# Patient Record
Sex: FEMALE | Race: NATIVE HAWAIIAN OR OTHER PACIFIC ISLANDER | NOT HISPANIC OR LATINO | Employment: UNEMPLOYED | URBAN - METROPOLITAN AREA
[De-identification: names, ages, dates, MRNs, and addresses within clinical notes are randomized per-mention and may not be internally consistent; named-entity substitution may affect disease eponyms.]

---

## 2021-08-13 ENCOUNTER — APPOINTMENT (OUTPATIENT)
Dept: ULTRASOUND IMAGING | Facility: CLINIC | Age: 41
End: 2021-08-13
Attending: EMERGENCY MEDICINE
Payer: MEDICAID

## 2021-08-13 ENCOUNTER — HOSPITAL ENCOUNTER (INPATIENT)
Facility: CLINIC | Age: 41
LOS: 10 days | Discharge: HOME HEALTH | End: 2021-08-23
Attending: EMERGENCY MEDICINE | Admitting: INTERNAL MEDICINE
Payer: MEDICAID

## 2021-08-13 DIAGNOSIS — K92.0 GASTROINTESTINAL HEMORRHAGE WITH HEMATEMESIS: ICD-10-CM

## 2021-08-13 DIAGNOSIS — R60.0 BILATERAL LOWER EXTREMITY EDEMA: ICD-10-CM

## 2021-08-13 DIAGNOSIS — Z20.822 CONTACT WITH AND (SUSPECTED) EXPOSURE TO COVID-19: ICD-10-CM

## 2021-08-13 DIAGNOSIS — K70.31 ALCOHOLIC CIRRHOSIS OF LIVER WITH ASCITES (H): ICD-10-CM

## 2021-08-13 DIAGNOSIS — R10.84 ABDOMINAL PAIN, GENERALIZED: ICD-10-CM

## 2021-08-13 LAB
ALBUMIN FLD-MCNC: 0.4 G/DL
ALBUMIN SERPL-MCNC: 1.7 G/DL (ref 3.4–5)
ALP SERPL-CCNC: 119 U/L (ref 40–150)
ALT SERPL W P-5'-P-CCNC: 18 U/L (ref 0–50)
ANION GAP SERPL CALCULATED.3IONS-SCNC: 8 MMOL/L (ref 3–14)
AST SERPL W P-5'-P-CCNC: 21 U/L (ref 0–45)
BASOPHILS # BLD AUTO: 0.1 10E3/UL (ref 0–0.2)
BASOPHILS NFR BLD AUTO: 1 %
BILIRUB SERPL-MCNC: 1.1 MG/DL (ref 0.2–1.3)
BUN SERPL-MCNC: 8 MG/DL (ref 7–30)
CALCIUM SERPL-MCNC: 7.6 MG/DL (ref 8.5–10.1)
CHLORIDE BLD-SCNC: 106 MMOL/L (ref 94–109)
CO2 SERPL-SCNC: 23 MMOL/L (ref 20–32)
CREAT SERPL-MCNC: 0.69 MG/DL (ref 0.52–1.04)
EOSINOPHIL # BLD AUTO: 0.3 10E3/UL (ref 0–0.7)
EOSINOPHIL NFR BLD AUTO: 2 %
ERYTHROCYTE [DISTWIDTH] IN BLOOD BY AUTOMATED COUNT: 15.6 % (ref 10–15)
GFR SERPL CREATININE-BSD FRML MDRD: >90 ML/MIN/1.73M2
GLUCOSE BLD-MCNC: 136 MG/DL (ref 70–99)
HCT VFR BLD AUTO: 29.8 % (ref 35–47)
HGB BLD-MCNC: 9.3 G/DL (ref 11.7–15.7)
HOLD SPECIMEN: NORMAL
IMM GRANULOCYTES # BLD: 0.1 10E3/UL
IMM GRANULOCYTES NFR BLD: 1 %
INR PPP: 1.72 (ref 0.85–1.15)
LACTATE SERPL-SCNC: 2.7 MMOL/L (ref 0.7–2)
LIPASE SERPL-CCNC: <10 U/L (ref 73–393)
LYMPHOCYTES # BLD AUTO: 2.5 10E3/UL (ref 0.8–5.3)
LYMPHOCYTES NFR BLD AUTO: 14 %
MCH RBC QN AUTO: 31 PG (ref 26.5–33)
MCHC RBC AUTO-ENTMCNC: 31.2 G/DL (ref 31.5–36.5)
MCV RBC AUTO: 99 FL (ref 78–100)
MONOCYTES # BLD AUTO: 1 10E3/UL (ref 0–1.3)
MONOCYTES NFR BLD AUTO: 6 %
NEUTROPHILS # BLD AUTO: 14.2 10E3/UL (ref 1.6–8.3)
NEUTROPHILS NFR BLD AUTO: 76 %
NRBC # BLD AUTO: 0 10E3/UL
NRBC BLD AUTO-RTO: 0 /100
PLATELET # BLD AUTO: 307 10E3/UL (ref 150–450)
POTASSIUM BLD-SCNC: 3.3 MMOL/L (ref 3.4–5.3)
PROT FLD-MCNC: 1.9 G/DL
PROT SERPL-MCNC: 6.7 G/DL (ref 6.8–8.8)
RBC # BLD AUTO: 3 10E6/UL (ref 3.8–5.2)
SARS-COV-2 RNA RESP QL NAA+PROBE: NEGATIVE
SODIUM SERPL-SCNC: 137 MMOL/L (ref 133–144)
WBC # BLD AUTO: 18.1 10E3/UL (ref 4–11)

## 2021-08-13 PROCEDURE — 36415 COLL VENOUS BLD VENIPUNCTURE: CPT | Performed by: INTERNAL MEDICINE

## 2021-08-13 PROCEDURE — 49083 ABD PARACENTESIS W/IMAGING: CPT | Performed by: EMERGENCY MEDICINE

## 2021-08-13 PROCEDURE — 83010 ASSAY OF HAPTOGLOBIN QUANT: CPT | Performed by: INTERNAL MEDICINE

## 2021-08-13 PROCEDURE — 250N000011 HC RX IP 250 OP 636: Performed by: EMERGENCY MEDICINE

## 2021-08-13 PROCEDURE — 89050 BODY FLUID CELL COUNT: CPT | Performed by: EMERGENCY MEDICINE

## 2021-08-13 PROCEDURE — 250N000013 HC RX MED GY IP 250 OP 250 PS 637: Performed by: EMERGENCY MEDICINE

## 2021-08-13 PROCEDURE — 85025 COMPLETE CBC W/AUTO DIFF WBC: CPT | Performed by: EMERGENCY MEDICINE

## 2021-08-13 PROCEDURE — 87205 SMEAR GRAM STAIN: CPT | Performed by: EMERGENCY MEDICINE

## 2021-08-13 PROCEDURE — 76705 ECHO EXAM OF ABDOMEN: CPT | Performed by: EMERGENCY MEDICINE

## 2021-08-13 PROCEDURE — C9803 HOPD COVID-19 SPEC COLLECT: HCPCS | Performed by: EMERGENCY MEDICINE

## 2021-08-13 PROCEDURE — 99221 1ST HOSP IP/OBS SF/LOW 40: CPT | Mod: AI | Performed by: INTERNAL MEDICINE

## 2021-08-13 PROCEDURE — C9113 INJ PANTOPRAZOLE SODIUM, VIA: HCPCS | Performed by: EMERGENCY MEDICINE

## 2021-08-13 PROCEDURE — 84157 ASSAY OF PROTEIN OTHER: CPT | Performed by: EMERGENCY MEDICINE

## 2021-08-13 PROCEDURE — 96361 HYDRATE IV INFUSION ADD-ON: CPT | Mod: 59 | Performed by: EMERGENCY MEDICINE

## 2021-08-13 PROCEDURE — 99285 EMERGENCY DEPT VISIT HI MDM: CPT | Mod: 25 | Performed by: EMERGENCY MEDICINE

## 2021-08-13 PROCEDURE — 96375 TX/PRO/DX INJ NEW DRUG ADDON: CPT | Mod: 59 | Performed by: EMERGENCY MEDICINE

## 2021-08-13 PROCEDURE — 76705 ECHO EXAM OF ABDOMEN: CPT | Mod: 26 | Performed by: EMERGENCY MEDICINE

## 2021-08-13 PROCEDURE — 93970 EXTREMITY STUDY: CPT

## 2021-08-13 PROCEDURE — 86901 BLOOD TYPING SEROLOGIC RH(D): CPT | Performed by: EMERGENCY MEDICINE

## 2021-08-13 PROCEDURE — 36415 COLL VENOUS BLD VENIPUNCTURE: CPT | Performed by: EMERGENCY MEDICINE

## 2021-08-13 PROCEDURE — 99207 PR CDG-HISTORY COMP: MEETS EXP. PROB FOCUSED- DOWN CODED LACK OF PFSH: CPT | Performed by: INTERNAL MEDICINE

## 2021-08-13 PROCEDURE — 85610 PROTHROMBIN TIME: CPT | Performed by: EMERGENCY MEDICINE

## 2021-08-13 PROCEDURE — 80053 COMPREHEN METABOLIC PANEL: CPT | Performed by: EMERGENCY MEDICINE

## 2021-08-13 PROCEDURE — U0005 INFEC AGEN DETEC AMPLI PROBE: HCPCS | Performed by: EMERGENCY MEDICINE

## 2021-08-13 PROCEDURE — 83690 ASSAY OF LIPASE: CPT | Performed by: EMERGENCY MEDICINE

## 2021-08-13 PROCEDURE — 83605 ASSAY OF LACTIC ACID: CPT | Performed by: EMERGENCY MEDICINE

## 2021-08-13 PROCEDURE — 250N000011 HC RX IP 250 OP 636: Performed by: INTERNAL MEDICINE

## 2021-08-13 PROCEDURE — 258N000003 HC RX IP 258 OP 636: Performed by: EMERGENCY MEDICINE

## 2021-08-13 PROCEDURE — 120N000002 HC R&B MED SURG/OB UMMC

## 2021-08-13 PROCEDURE — 89051 BODY FLUID CELL COUNT: CPT | Performed by: EMERGENCY MEDICINE

## 2021-08-13 PROCEDURE — 87040 BLOOD CULTURE FOR BACTERIA: CPT | Performed by: INTERNAL MEDICINE

## 2021-08-13 PROCEDURE — 96365 THER/PROPH/DIAG IV INF INIT: CPT | Mod: 59 | Performed by: EMERGENCY MEDICINE

## 2021-08-13 PROCEDURE — 99291 CRITICAL CARE FIRST HOUR: CPT | Mod: 25 | Performed by: EMERGENCY MEDICINE

## 2021-08-13 PROCEDURE — 99292 CRITICAL CARE ADDL 30 MIN: CPT | Performed by: EMERGENCY MEDICINE

## 2021-08-13 PROCEDURE — 96376 TX/PRO/DX INJ SAME DRUG ADON: CPT | Mod: 59 | Performed by: EMERGENCY MEDICINE

## 2021-08-13 PROCEDURE — 82042 OTHER SOURCE ALBUMIN QUAN EA: CPT | Performed by: EMERGENCY MEDICINE

## 2021-08-13 PROCEDURE — 93970 EXTREMITY STUDY: CPT | Mod: 26 | Performed by: RADIOLOGY

## 2021-08-13 RX ORDER — MORPHINE SULFATE 4 MG/ML
4 INJECTION, SOLUTION INTRAMUSCULAR; INTRAVENOUS ONCE
Status: COMPLETED | OUTPATIENT
Start: 2021-08-13 | End: 2021-08-13

## 2021-08-13 RX ORDER — OCTREOTIDE ACETATE 50 UG/ML
50 INJECTION, SOLUTION INTRAVENOUS; SUBCUTANEOUS ONCE
Status: COMPLETED | OUTPATIENT
Start: 2021-08-13 | End: 2021-08-13

## 2021-08-13 RX ORDER — SODIUM CHLORIDE 9 MG/ML
INJECTION, SOLUTION INTRAVENOUS ONCE
Status: COMPLETED | OUTPATIENT
Start: 2021-08-13 | End: 2021-08-13

## 2021-08-13 RX ORDER — POTASSIUM CHLORIDE 750 MG/1
40 TABLET, EXTENDED RELEASE ORAL ONCE
Status: COMPLETED | OUTPATIENT
Start: 2021-08-13 | End: 2021-08-13

## 2021-08-13 RX ORDER — MORPHINE SULFATE 4 MG/ML
4 INJECTION, SOLUTION INTRAMUSCULAR; INTRAVENOUS
Status: DISCONTINUED | OUTPATIENT
Start: 2021-08-13 | End: 2021-08-14

## 2021-08-13 RX ORDER — ALBUMIN (HUMAN) 12.5 G/50ML
100 SOLUTION INTRAVENOUS ONCE
Status: COMPLETED | OUTPATIENT
Start: 2021-08-13 | End: 2021-08-14

## 2021-08-13 RX ORDER — SPIRONOLACTONE 50 MG/1
50 TABLET, FILM COATED ORAL DAILY
Status: ON HOLD | COMMUNITY
End: 2021-08-23

## 2021-08-13 RX ORDER — LACTULOSE 10 G/15ML
10 SOLUTION ORAL 2 TIMES DAILY
COMMUNITY

## 2021-08-13 RX ORDER — CEFTRIAXONE 2 G/1
2 INJECTION, POWDER, FOR SOLUTION INTRAMUSCULAR; INTRAVENOUS ONCE
Status: COMPLETED | OUTPATIENT
Start: 2021-08-13 | End: 2021-08-14

## 2021-08-13 RX ORDER — FLUCONAZOLE 200 MG/1
200 TABLET ORAL DAILY
Status: ON HOLD | COMMUNITY
Start: 2021-08-04 | End: 2021-08-23

## 2021-08-13 RX ADMIN — MORPHINE SULFATE 4 MG: 4 INJECTION INTRAVENOUS at 20:25

## 2021-08-13 RX ADMIN — SODIUM CHLORIDE: 900 INJECTION, SOLUTION INTRAVENOUS at 21:40

## 2021-08-13 RX ADMIN — POTASSIUM CHLORIDE 40 MEQ: 750 TABLET, EXTENDED RELEASE ORAL at 23:00

## 2021-08-13 RX ADMIN — CEFTRIAXONE SODIUM 2 G: 2 INJECTION, POWDER, FOR SOLUTION INTRAMUSCULAR; INTRAVENOUS at 23:00

## 2021-08-13 RX ADMIN — PANTOPRAZOLE SODIUM 40 MG: 40 INJECTION, POWDER, FOR SOLUTION INTRAVENOUS at 22:37

## 2021-08-13 RX ADMIN — OCTREOTIDE ACETATE 50 MCG: 50 INJECTION, SOLUTION INTRAVENOUS; SUBCUTANEOUS at 22:41

## 2021-08-13 RX ADMIN — MORPHINE SULFATE 4 MG: 4 INJECTION INTRAVENOUS at 22:34

## 2021-08-13 RX ADMIN — SODIUM CHLORIDE 8 MG/HR: 9 INJECTION, SOLUTION INTRAVENOUS at 23:10

## 2021-08-13 ASSESSMENT — ENCOUNTER SYMPTOMS
COUGH: 0
ABDOMINAL PAIN: 1
FEVER: 0
VOMITING: 1

## 2021-08-13 ASSESSMENT — MIFFLIN-ST. JEOR: SCORE: 1682.4

## 2021-08-13 NOTE — ED TRIAGE NOTES
Patient was seen at [facility] and diagnosed with liver disease and daughter wants a second opinion. Patient says shes been having increased swelling in leg. Per family patient was also suppose to have paracentesis done this week, but hasn't yet.

## 2021-08-14 ENCOUNTER — APPOINTMENT (OUTPATIENT)
Dept: CT IMAGING | Facility: CLINIC | Age: 41
End: 2021-08-14
Payer: MEDICAID

## 2021-08-14 ENCOUNTER — APPOINTMENT (OUTPATIENT)
Dept: GENERAL RADIOLOGY | Facility: CLINIC | Age: 41
End: 2021-08-14
Payer: MEDICAID

## 2021-08-14 ENCOUNTER — APPOINTMENT (OUTPATIENT)
Dept: ULTRASOUND IMAGING | Facility: CLINIC | Age: 41
End: 2021-08-14
Payer: MEDICAID

## 2021-08-14 LAB
ABO/RH(D): NORMAL
ALBUMIN SERPL-MCNC: 2.8 G/DL (ref 3.4–5)
ALBUMIN UR-MCNC: NEGATIVE MG/DL
ALP SERPL-CCNC: 89 U/L (ref 40–150)
ALT SERPL W P-5'-P-CCNC: 12 U/L (ref 0–50)
ANION GAP SERPL CALCULATED.3IONS-SCNC: 4 MMOL/L (ref 3–14)
ANTIBODY SCREEN: NEGATIVE
APPEARANCE FLD: CLEAR
APPEARANCE UR: CLEAR
APTT PPP: 41 SECONDS (ref 22–38)
AST SERPL W P-5'-P-CCNC: 22 U/L (ref 0–45)
BASOPHILS # BLD AUTO: 0.1 10E3/UL (ref 0–0.2)
BASOPHILS # BLD AUTO: 0.1 10E3/UL (ref 0–0.2)
BASOPHILS NFR BLD AUTO: 1 %
BASOPHILS NFR BLD AUTO: 1 %
BILIRUB SERPL-MCNC: 1.4 MG/DL (ref 0.2–1.3)
BILIRUB UR QL STRIP: NEGATIVE
BUN SERPL-MCNC: 7 MG/DL (ref 7–30)
CALCIUM SERPL-MCNC: 8.1 MG/DL (ref 8.5–10.1)
CHLORIDE BLD-SCNC: 107 MMOL/L (ref 94–109)
CO2 SERPL-SCNC: 26 MMOL/L (ref 20–32)
COLOR FLD: YELLOW
COLOR UR AUTO: ABNORMAL
CREAT SERPL-MCNC: 0.7 MG/DL (ref 0.52–1.04)
CREAT UR-MCNC: 41 MG/DL
EOSINOPHIL # BLD AUTO: 0.3 10E3/UL (ref 0–0.7)
EOSINOPHIL # BLD AUTO: 0.4 10E3/UL (ref 0–0.7)
EOSINOPHIL NFR BLD AUTO: 3 %
EOSINOPHIL NFR BLD AUTO: 3 %
ERYTHROCYTE [DISTWIDTH] IN BLOOD BY AUTOMATED COUNT: 15.3 % (ref 10–15)
ERYTHROCYTE [DISTWIDTH] IN BLOOD BY AUTOMATED COUNT: 15.5 % (ref 10–15)
ERYTHROCYTE [DISTWIDTH] IN BLOOD BY AUTOMATED COUNT: 15.6 % (ref 10–15)
FOLATE SERPL-MCNC: 4.7 NG/ML
GFR SERPL CREATININE-BSD FRML MDRD: >90 ML/MIN/1.73M2
GLUCOSE BLD-MCNC: 82 MG/DL (ref 70–99)
GLUCOSE UR STRIP-MCNC: NEGATIVE MG/DL
HCT VFR BLD AUTO: 24.5 % (ref 35–47)
HCT VFR BLD AUTO: 25.1 % (ref 35–47)
HCT VFR BLD AUTO: 27.6 % (ref 35–47)
HGB BLD-MCNC: 7.7 G/DL (ref 11.7–15.7)
HGB BLD-MCNC: 7.8 G/DL (ref 11.7–15.7)
HGB BLD-MCNC: 8.4 G/DL (ref 11.7–15.7)
HGB BLD-MCNC: 8.6 G/DL (ref 11.7–15.7)
HGB UR QL STRIP: ABNORMAL
HYALINE CASTS: 15 /LPF
IMM GRANULOCYTES # BLD: 0.1 10E3/UL
IMM GRANULOCYTES # BLD: 0.1 10E3/UL
IMM GRANULOCYTES NFR BLD: 0 %
IMM GRANULOCYTES NFR BLD: 1 %
INR PPP: 1.97 (ref 0.85–1.15)
KETONES UR STRIP-MCNC: NEGATIVE MG/DL
LACTATE SERPL-SCNC: 2.1 MMOL/L (ref 0.7–2)
LACTATE SERPL-SCNC: 2.8 MMOL/L (ref 0.7–2)
LEUKOCYTE ESTERASE UR QL STRIP: NEGATIVE
LYMPHOCYTES # BLD AUTO: 1.8 10E3/UL (ref 0.8–5.3)
LYMPHOCYTES # BLD AUTO: 3.1 10E3/UL (ref 0.8–5.3)
LYMPHOCYTES NFR BLD AUTO: 15 %
LYMPHOCYTES NFR BLD AUTO: 22 %
LYMPHOCYTES NFR FLD MANUAL: 47 %
MCH RBC QN AUTO: 31.2 PG (ref 26.5–33)
MCH RBC QN AUTO: 31.3 PG (ref 26.5–33)
MCH RBC QN AUTO: 31.4 PG (ref 26.5–33)
MCHC RBC AUTO-ENTMCNC: 31.1 G/DL (ref 31.5–36.5)
MCHC RBC AUTO-ENTMCNC: 31.2 G/DL (ref 31.5–36.5)
MCHC RBC AUTO-ENTMCNC: 31.4 G/DL (ref 31.5–36.5)
MCV RBC AUTO: 100 FL (ref 78–100)
MONOCYTES # BLD AUTO: 0.8 10E3/UL (ref 0–1.3)
MONOCYTES # BLD AUTO: 0.9 10E3/UL (ref 0–1.3)
MONOCYTES NFR BLD AUTO: 7 %
MONOCYTES NFR BLD AUTO: 7 %
MONOS+MACROS NFR FLD MANUAL: 45 %
MUCOUS THREADS #/AREA URNS LPF: PRESENT /LPF
NEUTROPHILS # BLD AUTO: 9 10E3/UL (ref 1.6–8.3)
NEUTROPHILS # BLD AUTO: 9.4 10E3/UL (ref 1.6–8.3)
NEUTROPHILS NFR BLD AUTO: 67 %
NEUTROPHILS NFR BLD AUTO: 73 %
NEUTS BAND NFR FLD MANUAL: 8 %
NITRATE UR QL: NEGATIVE
NRBC # BLD AUTO: 0 10E3/UL
NRBC # BLD AUTO: 0 10E3/UL
NRBC BLD AUTO-RTO: 0 /100
NRBC BLD AUTO-RTO: 0 /100
PH UR STRIP: 5.5 [PH] (ref 5–7)
PLATELET # BLD AUTO: 232 10E3/UL (ref 150–450)
PLATELET # BLD AUTO: 248 10E3/UL (ref 150–450)
PLATELET # BLD AUTO: 257 10E3/UL (ref 150–450)
POTASSIUM BLD-SCNC: 3.5 MMOL/L (ref 3.4–5.3)
PROT SERPL-MCNC: 6.8 G/DL (ref 6.8–8.8)
PROT UR-MCNC: 0.12 G/L
PROT/CREAT 24H UR: 0.29 G/G CR (ref 0–0.2)
RBC # BLD AUTO: 2.45 10E6/UL (ref 3.8–5.2)
RBC # BLD AUTO: 2.5 10E6/UL (ref 3.8–5.2)
RBC # BLD AUTO: 2.75 10E6/UL (ref 3.8–5.2)
RBC URINE: <1 /HPF
RETICS # AUTO: 0.06 10E6/UL (ref 0.03–0.1)
RETICS # AUTO: 0.07 10E6/UL (ref 0.03–0.1)
RETICS/RBC NFR AUTO: 2.4 % (ref 0.5–2)
RETICS/RBC NFR AUTO: 2.5 % (ref 0.5–2)
SODIUM SERPL-SCNC: 137 MMOL/L (ref 133–144)
SP GR UR STRIP: 1.02 (ref 1–1.03)
SPECIMEN EXPIRATION DATE: NORMAL
SQUAMOUS EPITHELIAL: 4 /HPF
TSH SERPL DL<=0.005 MIU/L-ACNC: 0.63 MU/L (ref 0.4–4)
UROBILINOGEN UR STRIP-MCNC: NORMAL MG/DL
VIT B12 SERPL-MCNC: 776 PG/ML (ref 193–986)
WBC # BLD AUTO: 12 10E3/UL (ref 4–11)
WBC # BLD AUTO: 12.1 10E3/UL (ref 4–11)
WBC # BLD AUTO: 13.9 10E3/UL (ref 4–11)
WBC # FLD AUTO: 118 /UL
WBC URINE: 1 /HPF

## 2021-08-14 PROCEDURE — 80321 ALCOHOLS BIOMARKERS 1OR 2: CPT | Performed by: STUDENT IN AN ORGANIZED HEALTH CARE EDUCATION/TRAINING PROGRAM

## 2021-08-14 PROCEDURE — 85025 COMPLETE CBC W/AUTO DIFF WBC: CPT

## 2021-08-14 PROCEDURE — 258N000003 HC RX IP 258 OP 636

## 2021-08-14 PROCEDURE — 82607 VITAMIN B-12: CPT | Performed by: STUDENT IN AN ORGANIZED HEALTH CARE EDUCATION/TRAINING PROGRAM

## 2021-08-14 PROCEDURE — 80321 ALCOHOLS BIOMARKERS 1OR 2: CPT | Performed by: EMERGENCY MEDICINE

## 2021-08-14 PROCEDURE — 85045 AUTOMATED RETICULOCYTE COUNT: CPT | Performed by: STUDENT IN AN ORGANIZED HEALTH CARE EDUCATION/TRAINING PROGRAM

## 2021-08-14 PROCEDURE — 85027 COMPLETE CBC AUTOMATED: CPT | Performed by: STUDENT IN AN ORGANIZED HEALTH CARE EDUCATION/TRAINING PROGRAM

## 2021-08-14 PROCEDURE — 250N000011 HC RX IP 250 OP 636

## 2021-08-14 PROCEDURE — 83605 ASSAY OF LACTIC ACID: CPT

## 2021-08-14 PROCEDURE — 96366 THER/PROPH/DIAG IV INF ADDON: CPT | Performed by: EMERGENCY MEDICINE

## 2021-08-14 PROCEDURE — 80307 DRUG TEST PRSMV CHEM ANLYZR: CPT | Performed by: STUDENT IN AN ORGANIZED HEALTH CARE EDUCATION/TRAINING PROGRAM

## 2021-08-14 PROCEDURE — 83605 ASSAY OF LACTIC ACID: CPT | Performed by: STUDENT IN AN ORGANIZED HEALTH CARE EDUCATION/TRAINING PROGRAM

## 2021-08-14 PROCEDURE — 250N000013 HC RX MED GY IP 250 OP 250 PS 637: Performed by: INTERNAL MEDICINE

## 2021-08-14 PROCEDURE — 36415 COLL VENOUS BLD VENIPUNCTURE: CPT | Performed by: STUDENT IN AN ORGANIZED HEALTH CARE EDUCATION/TRAINING PROGRAM

## 2021-08-14 PROCEDURE — 99222 1ST HOSP IP/OBS MODERATE 55: CPT | Mod: GC | Performed by: INTERNAL MEDICINE

## 2021-08-14 PROCEDURE — 84443 ASSAY THYROID STIM HORMONE: CPT | Performed by: STUDENT IN AN ORGANIZED HEALTH CARE EDUCATION/TRAINING PROGRAM

## 2021-08-14 PROCEDURE — 71045 X-RAY EXAM CHEST 1 VIEW: CPT

## 2021-08-14 PROCEDURE — 120N000002 HC R&B MED SURG/OB UMMC

## 2021-08-14 PROCEDURE — 999N000127 HC STATISTIC PERIPHERAL IV START W US GUIDANCE

## 2021-08-14 PROCEDURE — 85018 HEMOGLOBIN: CPT | Performed by: STUDENT IN AN ORGANIZED HEALTH CARE EDUCATION/TRAINING PROGRAM

## 2021-08-14 PROCEDURE — 250N000013 HC RX MED GY IP 250 OP 250 PS 637: Performed by: STUDENT IN AN ORGANIZED HEALTH CARE EDUCATION/TRAINING PROGRAM

## 2021-08-14 PROCEDURE — 74177 CT ABD & PELVIS W/CONTRAST: CPT | Mod: 26 | Performed by: STUDENT IN AN ORGANIZED HEALTH CARE EDUCATION/TRAINING PROGRAM

## 2021-08-14 PROCEDURE — 250N000011 HC RX IP 250 OP 636: Performed by: INTERNAL MEDICINE

## 2021-08-14 PROCEDURE — C9113 INJ PANTOPRAZOLE SODIUM, VIA: HCPCS

## 2021-08-14 PROCEDURE — 99233 SBSQ HOSP IP/OBS HIGH 50: CPT | Mod: GC | Performed by: INTERNAL MEDICINE

## 2021-08-14 PROCEDURE — 85025 COMPLETE CBC W/AUTO DIFF WBC: CPT | Performed by: STUDENT IN AN ORGANIZED HEALTH CARE EDUCATION/TRAINING PROGRAM

## 2021-08-14 PROCEDURE — 250N000011 HC RX IP 250 OP 636: Performed by: STUDENT IN AN ORGANIZED HEALTH CARE EDUCATION/TRAINING PROGRAM

## 2021-08-14 PROCEDURE — 82746 ASSAY OF FOLIC ACID SERUM: CPT | Performed by: STUDENT IN AN ORGANIZED HEALTH CARE EDUCATION/TRAINING PROGRAM

## 2021-08-14 PROCEDURE — 85610 PROTHROMBIN TIME: CPT

## 2021-08-14 PROCEDURE — 74177 CT ABD & PELVIS W/CONTRAST: CPT

## 2021-08-14 PROCEDURE — 80053 COMPREHEN METABOLIC PANEL: CPT

## 2021-08-14 PROCEDURE — P9047 ALBUMIN (HUMAN), 25%, 50ML: HCPCS

## 2021-08-14 PROCEDURE — 81001 URINALYSIS AUTO W/SCOPE: CPT | Performed by: EMERGENCY MEDICINE

## 2021-08-14 PROCEDURE — 84156 ASSAY OF PROTEIN URINE: CPT | Performed by: STUDENT IN AN ORGANIZED HEALTH CARE EDUCATION/TRAINING PROGRAM

## 2021-08-14 PROCEDURE — 250N000013 HC RX MED GY IP 250 OP 250 PS 637

## 2021-08-14 PROCEDURE — 71045 X-RAY EXAM CHEST 1 VIEW: CPT | Mod: 26 | Performed by: RADIOLOGY

## 2021-08-14 PROCEDURE — 93975 VASCULAR STUDY: CPT

## 2021-08-14 PROCEDURE — 36415 COLL VENOUS BLD VENIPUNCTURE: CPT

## 2021-08-14 PROCEDURE — 93975 VASCULAR STUDY: CPT | Mod: 26 | Performed by: RADIOLOGY

## 2021-08-14 PROCEDURE — 96368 THER/DIAG CONCURRENT INF: CPT | Performed by: EMERGENCY MEDICINE

## 2021-08-14 PROCEDURE — 85730 THROMBOPLASTIN TIME PARTIAL: CPT

## 2021-08-14 PROCEDURE — 250N000011 HC RX IP 250 OP 636: Performed by: EMERGENCY MEDICINE

## 2021-08-14 RX ORDER — NALOXONE HYDROCHLORIDE 0.4 MG/ML
0.2 INJECTION, SOLUTION INTRAMUSCULAR; INTRAVENOUS; SUBCUTANEOUS
Status: DISCONTINUED | OUTPATIENT
Start: 2021-08-14 | End: 2021-08-23 | Stop reason: HOSPADM

## 2021-08-14 RX ORDER — FUROSEMIDE 10 MG/ML
20 INJECTION INTRAMUSCULAR; INTRAVENOUS ONCE
Status: COMPLETED | OUTPATIENT
Start: 2021-08-14 | End: 2021-08-14

## 2021-08-14 RX ORDER — FOLIC ACID 1 MG/1
1 TABLET ORAL DAILY
Status: DISCONTINUED | OUTPATIENT
Start: 2021-08-14 | End: 2021-08-23 | Stop reason: HOSPADM

## 2021-08-14 RX ORDER — FLUCONAZOLE 100 MG/1
200 TABLET ORAL DAILY
Status: DISCONTINUED | OUTPATIENT
Start: 2021-08-14 | End: 2021-08-15

## 2021-08-14 RX ORDER — HYDROMORPHONE HYDROCHLORIDE 1 MG/ML
0.5 INJECTION, SOLUTION INTRAMUSCULAR; INTRAVENOUS; SUBCUTANEOUS
Status: DISCONTINUED | OUTPATIENT
Start: 2021-08-14 | End: 2021-08-17

## 2021-08-14 RX ORDER — LACTULOSE 10 G/15ML
10 SOLUTION ORAL 2 TIMES DAILY
Status: DISCONTINUED | OUTPATIENT
Start: 2021-08-14 | End: 2021-08-14

## 2021-08-14 RX ORDER — ACETAMINOPHEN 500 MG
500 TABLET ORAL EVERY 6 HOURS PRN
Status: DISCONTINUED | OUTPATIENT
Start: 2021-08-14 | End: 2021-08-14

## 2021-08-14 RX ORDER — NALOXONE HYDROCHLORIDE 0.4 MG/ML
0.4 INJECTION, SOLUTION INTRAMUSCULAR; INTRAVENOUS; SUBCUTANEOUS
Status: DISCONTINUED | OUTPATIENT
Start: 2021-08-14 | End: 2021-08-23 | Stop reason: HOSPADM

## 2021-08-14 RX ORDER — HYDROMORPHONE HYDROCHLORIDE 2 MG/1
2 TABLET ORAL EVERY 4 HOURS PRN
Status: DISCONTINUED | OUTPATIENT
Start: 2021-08-14 | End: 2021-08-14

## 2021-08-14 RX ORDER — CEFTRIAXONE 2 G/1
2 INJECTION, POWDER, FOR SOLUTION INTRAMUSCULAR; INTRAVENOUS EVERY 24 HOURS
Status: DISCONTINUED | OUTPATIENT
Start: 2021-08-15 | End: 2021-08-14

## 2021-08-14 RX ORDER — SENNOSIDES 8.6 MG
8.6 TABLET ORAL 2 TIMES DAILY PRN
Status: DISCONTINUED | OUTPATIENT
Start: 2021-08-14 | End: 2021-08-23 | Stop reason: HOSPADM

## 2021-08-14 RX ORDER — IOPAMIDOL 755 MG/ML
135 INJECTION, SOLUTION INTRAVASCULAR ONCE
Status: COMPLETED | OUTPATIENT
Start: 2021-08-14 | End: 2021-08-14

## 2021-08-14 RX ORDER — SPIRONOLACTONE 25 MG/1
50 TABLET ORAL DAILY
Status: DISCONTINUED | OUTPATIENT
Start: 2021-08-14 | End: 2021-08-16

## 2021-08-14 RX ORDER — CEFTRIAXONE 2 G/1
2 INJECTION, POWDER, FOR SOLUTION INTRAMUSCULAR; INTRAVENOUS AT BEDTIME
Status: DISCONTINUED | OUTPATIENT
Start: 2021-08-14 | End: 2021-08-15

## 2021-08-14 RX ORDER — ACETAMINOPHEN 325 MG/1
650 TABLET ORAL EVERY 8 HOURS
Status: DISCONTINUED | OUTPATIENT
Start: 2021-08-14 | End: 2021-08-23 | Stop reason: HOSPADM

## 2021-08-14 RX ORDER — POLYETHYLENE GLYCOL 3350 17 G/17G
17 POWDER, FOR SOLUTION ORAL DAILY PRN
Status: DISCONTINUED | OUTPATIENT
Start: 2021-08-14 | End: 2021-08-19

## 2021-08-14 RX ORDER — PANTOPRAZOLE SODIUM 40 MG/1
40 TABLET, DELAYED RELEASE ORAL
Status: DISCONTINUED | OUTPATIENT
Start: 2021-08-14 | End: 2021-08-23 | Stop reason: HOSPADM

## 2021-08-14 RX ORDER — LIDOCAINE 40 MG/G
CREAM TOPICAL
Status: DISCONTINUED | OUTPATIENT
Start: 2021-08-14 | End: 2021-08-23 | Stop reason: HOSPADM

## 2021-08-14 RX ORDER — OXYCODONE HYDROCHLORIDE 5 MG/1
5 TABLET ORAL EVERY 6 HOURS PRN
Status: DISCONTINUED | OUTPATIENT
Start: 2021-08-14 | End: 2021-08-21

## 2021-08-14 RX ORDER — LACTULOSE 10 G/15ML
20 SOLUTION ORAL 2 TIMES DAILY
Status: DISCONTINUED | OUTPATIENT
Start: 2021-08-14 | End: 2021-08-16

## 2021-08-14 RX ORDER — LANOLIN ALCOHOL/MO/W.PET/CERES
100 CREAM (GRAM) TOPICAL DAILY
Status: DISCONTINUED | OUTPATIENT
Start: 2021-08-14 | End: 2021-08-23 | Stop reason: HOSPADM

## 2021-08-14 RX ADMIN — IOPAMIDOL 135 ML: 755 INJECTION, SOLUTION INTRAVENOUS at 14:41

## 2021-08-14 RX ADMIN — OXYCODONE 5 MG: 5 TABLET ORAL at 15:01

## 2021-08-14 RX ADMIN — CEFTRIAXONE SODIUM 2 G: 2 INJECTION, POWDER, FOR SOLUTION INTRAMUSCULAR; INTRAVENOUS at 21:20

## 2021-08-14 RX ADMIN — FUROSEMIDE 20 MG: 10 INJECTION, SOLUTION INTRAVENOUS at 15:00

## 2021-08-14 RX ADMIN — ACETAMINOPHEN 650 MG: 325 TABLET, FILM COATED ORAL at 10:49

## 2021-08-14 RX ADMIN — SODIUM CHLORIDE 8 MG/HR: 9 INJECTION, SOLUTION INTRAVENOUS at 10:54

## 2021-08-14 RX ADMIN — ACETAMINOPHEN 650 MG: 325 TABLET, FILM COATED ORAL at 18:40

## 2021-08-14 RX ADMIN — HYDROMORPHONE HYDROCHLORIDE 0.5 MG: 1 INJECTION, SOLUTION INTRAMUSCULAR; INTRAVENOUS; SUBCUTANEOUS at 18:40

## 2021-08-14 RX ADMIN — POLYETHYLENE GLYCOL 3350 17 G: 17 POWDER, FOR SOLUTION ORAL at 18:51

## 2021-08-14 RX ADMIN — SENNOSIDES 8.6 MG: 8.6 TABLET, FILM COATED ORAL at 18:50

## 2021-08-14 RX ADMIN — FOLIC ACID 1 MG: 1 TABLET ORAL at 15:01

## 2021-08-14 RX ADMIN — OCTREOTIDE ACETATE 50 MCG/HR: 500 INJECTION, SOLUTION INTRAVENOUS; SUBCUTANEOUS at 00:16

## 2021-08-14 RX ADMIN — HYDROMORPHONE HYDROCHLORIDE 0.5 MG: 1 INJECTION, SOLUTION INTRAMUSCULAR; INTRAVENOUS; SUBCUTANEOUS at 15:01

## 2021-08-14 RX ADMIN — LACTULOSE 20 G: 10 SOLUTION ORAL at 20:41

## 2021-08-14 RX ADMIN — MORPHINE SULFATE 4 MG: 4 INJECTION INTRAVENOUS at 05:10

## 2021-08-14 RX ADMIN — MORPHINE SULFATE 4 MG: 4 INJECTION INTRAVENOUS at 08:37

## 2021-08-14 RX ADMIN — OXYCODONE 5 MG: 5 TABLET ORAL at 21:19

## 2021-08-14 RX ADMIN — LACTULOSE 10 G: 20 SOLUTION ORAL at 08:35

## 2021-08-14 RX ADMIN — SPIRONOLACTONE 50 MG: 25 TABLET, FILM COATED ORAL at 08:35

## 2021-08-14 RX ADMIN — FLUCONAZOLE 200 MG: 100 TABLET ORAL at 08:35

## 2021-08-14 RX ADMIN — ALBUMIN HUMAN 100 G: 0.25 SOLUTION INTRAVENOUS at 02:37

## 2021-08-14 RX ADMIN — HYDROMORPHONE HYDROCHLORIDE 2 MG: 2 TABLET ORAL at 13:46

## 2021-08-14 RX ADMIN — MORPHINE SULFATE 4 MG: 4 INJECTION INTRAVENOUS at 02:18

## 2021-08-14 RX ADMIN — THIAMINE HCL TAB 100 MG 100 MG: 100 TAB at 15:01

## 2021-08-14 RX ADMIN — PANTOPRAZOLE SODIUM 40 MG: 40 TABLET, DELAYED RELEASE ORAL at 18:40

## 2021-08-14 ASSESSMENT — ACTIVITIES OF DAILY LIVING (ADL)
WALKING_OR_CLIMBING_STAIRS_DIFFICULTY: YES
FALL_HISTORY_WITHIN_LAST_SIX_MONTHS: YES
CONCENTRATING,_REMEMBERING_OR_MAKING_DECISIONS_DIFFICULTY: NO
DIFFICULTY_EATING/SWALLOWING: NO
NUMBER_OF_TIMES_PATIENT_HAS_FALLEN_WITHIN_LAST_SIX_MONTHS: 1
ADLS_ACUITY_SCORE: 18
WEAR_GLASSES_OR_BLIND: NO
DIFFICULTY_COMMUNICATING: NO
ADLS_ACUITY_SCORE: 18
DRESSING/BATHING: BATHING DIFFICULTY, REQUIRES EQUIPMENT
ADLS_ACUITY_SCORE: 18
WHICH_OF_THE_ABOVE_FUNCTIONAL_RISKS_HAD_A_RECENT_ONSET_OR_CHANGE?: FALL HISTORY
DRESSING/BATHING_DIFFICULTY: YES
TOILETING_ISSUES: YES
HEARING_DIFFICULTY_OR_DEAF: NO
EQUIPMENT_CURRENTLY_USED_AT_HOME: WALKER, STANDARD
ADLS_ACUITY_SCORE: 18
DOING_ERRANDS_INDEPENDENTLY_DIFFICULTY: NO
TOILETING_ASSISTANCE: TOILETING DIFFICULTY, ASSISTANCE 1 PERSON
WALKING_OR_CLIMBING_STAIRS: AMBULATION DIFFICULTY, ASSISTANCE 1 PERSON;STAIR CLIMBING DIFFICULTY, ASSISTANCE 1 PERSON
ADLS_ACUITY_SCORE: 18

## 2021-08-14 ASSESSMENT — MIFFLIN-ST. JEOR: SCORE: 1836.13

## 2021-08-14 NOTE — PROGRESS NOTES
Reason for transfer: Medical admission  Transferred from: [facility] ED  Report received from: Essie RN   2 RN skin assessment completed by: Jonathan Hill RN Rosa Gastelum RN: scabs on RLE, scar on LLE, skin tear L pannus.   Bed algorithm reevaluated: Yes  Was Pulsate ordered?: No   Detailed Belongings: one purse, one pajama bottom, one wallet, one cell phone, one lighter.

## 2021-08-14 NOTE — H&P
[facility]    History and Physical - [facility] [facility]        Date of Admission:  8/13/2021    Assessment & Plan      Maricruz Lechuga is a 41 year old female admitted on 8/13/2021. She has a history of alcoholic cirrhosis with ascites, morbid obesity, tobacco dependence, and prior alcohol abuse and is admitted for reported hematemesis in the setting of decompensated alcoholic liver cirrhosis, with concern for possible variceal bleed, in addition to chronic ongoing abdominal pain and BLE swelling.    # Reported hematemesis, possible variceal bleed  # Hx recent melena w/ clots  Pt reported one episode of small-volume hematemesis at 0600 8/13 with bright red blood visualized. She reports one additional episode of emesis at 1400 with some red streaks visualized, but less blood than prior. Prior EGD reported at outside hospital w/ results copied into ED, but unable to locate those in the chart to confirm. Hb 9.3 (prior: 8 on 8/14).  - IV Protonix gtt  - IV Octreotide gtt  - IV ceftriaxone  - Discontinued mIVF started in ED; gave 100 g albumin  - GI consulted in the AM; appreciate recs    # Possible SIRS  # Lactic acidosis  # Acute on chronic leukocytosis  Lactate 2.7. Pt mildly tachycardic, but patient is otherwise stable. No SBP on paracentesis. WBC 18.1 compared to 14 prior on 8/4, but pt has had similar leukocytosis in the past. Was previously on steroids, but no longer taking per pt. Patient may be intravascularly depleted (albumin 1.3).  - 100 g albumin orderd; repeat lactate after administration  - blood cultures x2 obtained  - UA ordered  - continue IV ceftriaxone as noted above; broaden if pt becomes febrile  - monitor CBC    # Decompensated alcoholic liver cirrhosis with ascites  Pt w/ prior Hx alcohol abuse (reported last drink 1 year ago). Appears to be taking lactulose and spironolactone as outpatient.  MELD-Na score: 13 at 8/13/2021  6:07 PM  MELD score: 13 at  8/13/2021  6:07 PM  Calculated from:  Serum Creatinine: 0.69 mg/dL (Using min of 1 mg/dL) at 8/13/2021  6:07 PM  Serum Sodium: 137 mmol/L at 8/13/2021  6:07 PM  Total Bilirubin: 1.1 mg/dL at 8/13/2021  6:07 PM  INR(ratio): 1.72 at 8/13/2021  6:07 PM  Age: 41 years    - lactulose 10 g BID  - Held PTA protonix 40 mg BID (on PPI gtt)  - spironolactone 50 mg qAM  - serum alcohol level ordered  - GI consulted; appreciate recs    # BLE swelling and pain  # Volume Status  Pt is on spironolactone, but denies other diuretic use as outpatient. Has used compression stockings as an outpatient, but does not feel that they are effective.  - LE Doppler ultrasound negative for DVT  - no evidence of cellulitis at this time  - continue to monitor    # Hypokalemia  S/p 40 mEq given in the ED  - repeat BMP in the AM    # Acute on chronic normocytic anemia  DDx including normocytic anemia in the setting of acute blood loss, versus possible iron-deficiency anemia w/ element of B12 or folate deficiency.  - consider iron studies, B12, folate studies in the AM    # Generalized abdominal pain  Pt reports chronic ongoing abdominal pain for 5-6 months, it is currently at baseline for her. Diffuse, but worse in the RUQ. Pt has had numerous paracenteses in the past w/ no SBP. Repeat para in the ED here w/ no evidence of SBP. Lipase was WNL. Suspect chronic abdominal pain in the setting of chronic ascites.  - follow-up paracentesis cultures  - therapeutic paracentesis PRN for comfort  - Tylenol 500 mg q6h PRN for pain (max 2 g per 24 hrs)    # Recent yeast cystitis  - continue fluconazole 200 mg daily (8/4 - 8/17)  - repeat UA ordered    # Tobacco dependence - pt reports use of 1-2 cigarettes per day       Diet: NPO for Medical/Clinical Reasons Except for: Meds  DVT Prophylaxis: Held currently  Anderson Catheter: Not present  Fluids: mIVF at 125 ml/hr   Central Lines: None  Code Status: Full Code    Clinically Significant Risk Factors Present on  Admission             # Coagulation Defect: INR = 1.72 (Ref range: 0.85 - 1.15) and/or PTT = N/A on admission, will monitor for bleeding       Disposition Plan   Expected discharge: 3-5 days recommended to prior living arrangement once adequate pain management/ tolerating PO medications, antibiotic plan established, hemoglobin stable and SIRS/Sepsis treated.     The patient's care was discussed with the Attending Physician, Dr. Moustapha Silva.    Laura Rowley MD  [facility]  Securely message with the Vocera Web Console (learn more here)  Text page via [facility] Paging/Directory  Please see sign in/sign out for up to date coverage information  ______________________________________________________________________    Chief Complaint : Leg swelling    History is obtained from the patient, her daughter, and chart review.    History of Present Illness   Maricruz Lechuga is a 41 year old female who has a history of alcoholic cirrhosis with ascites, morbid obesity, tobacco dependence, prior alcohol abuse and is presenting d/t complaints of BLE swelling, diffuse chronic abdominal pain, and an episode of hematemesis.    Per chart review, the patient has had multiple admissions over the past several months for abdominal pain. She was most recently admitted at [facility] in [facility] on 7/31 with abdominal pain and BRBPR, placed empirically on IV Rocephin. The BRBPR was determined to be d/t hemorrhoid. The patient has Hx of numerous paracenteses in the past with no Hx SBP. She underwent paracentesis last on 8/2 with 4.4L fluid removed, no SBP. Rocephin for SBP empiric tx was discontinued on 8/2. Per ED note (although unable to find in chart), the patient had an EGD showing gastritis and a PPI was started. During a previous admission at [facility], the patient was prescribed a prednisolone taper for acute alcoholic hepatitis in June, but reported during  her admission 7/31 that she did not take it as prescribed. She was treated during admission 7/31 for candida albicans UTI, on diflucan x14 days.    The patient reports an episode of hematemesis at 0600 - she states that she tasted blood in the back of her throat and saw blood when she spit it out. The patient reports that her children were concerned and drove her to [facility] for further evaluation (they were unhappy with prior care at [place]). The patient states that on the way to the hospital she had one further episode of emesis where she visualized streaks of blood in the vomit. The patient also notes that 2-3 nights ago she had a BM and noticed dark blood clots present when wiping. She has not had a BM since then. The patient reports chronic abdominal pain x1 year. She states that her current pain is diffuse, worse over her liver, and unchanged from baseline. She reports feeling weaker than usual and fatigued - states that her legs nearly gave out on her when she went to use the commode earlier today. The patient denies recent alcohol use; she states that her last drink was ~1 year ago (she quit for her grandson who is now 1 year old), although per care everywhere the patient's daughter had previously reported that her last drink was 2 months ago. The patient current smokes 1-2 cigarettes per day.     In the ED, pt was afebrile, , /93, 100% SpO2 on room air. Labs were notable for leukocytosis to 18.1, although prior was 14 on 8/4 per care everywhere. Hb 9.3 from 8 prior on 8/4. INR 1.72, albumin 1.7. LFT's WNL. Lactate 2.7. The patient received 2 Gm IV ceftriaxone, 40 mEq potassium chloride, and was started on mIVF at 125 ml/hr. She received 4 mg morphine for pain. Diagnostic paracentesis was performed with .    Last CT A/P 7/22/21:  IMPRESSION:   1. Hepatocellular disease/hepatic steatosis with sequela of portal venous hypertension including splenomegaly, recanalization of the umbilical vein, and  mild-to-moderate ascites.  Increasing moderate anasarca.   2. Otherwise, no other acute process within the abdomen or pelvis.  3. Stable ill-defined hypoattenuating areas within the anterior left hepatic lobe. This could represent more focal fatty deposition although nonemergent MRI of the liver is recommended for further evaluation.    Review of Systems    The 10 point Review of Systems is negative other than noted in the HPI or here.    Past Medical History    Alcoholic cirrhosis with ascites  Morbid obesity  Tobacco dependence  Prior alcohol abuse    Past Surgical History   Hysterectomy    Social History   Tobacco: 1-2 cigarettes daily  Alcohol: denies recent alcohol use; she states that her last drink was ~1 year ago (she quit for her grandson who is now 1 year old), although per care everywhere the patient's daughter had previously reported that her last drink was 2 months ago.    Family History         Prior to Admission Medications   Prior to Admission Medications   Prescriptions Last Dose Informant Patient Reported? Taking?   fluconazole (DIFLUCAN) 200 MG tablet   Yes Yes   Sig: Take 200 mg by mouth daily   lactulose (CHRONULAC) 10 GM/15ML solution   Yes Yes   Sig: Take 10 g by mouth 2 times daily   spironolactone (ALDACTONE) 50 MG tablet   Yes Yes   Sig: Take 50 mg by mouth daily      Facility-Administered Medications: None     Allergies   No Known Allergies    Physical Exam   Vital Signs: Temp: 98.2  F (36.8  C) Temp src: Oral BP: 119/67 Pulse: 113   Resp: 16 SpO2: 100 % O2 Device: None (Room air)    Weight: 230 lbs 0 oz   General Appearance: alert, appears comfortable   HEENT: EOMI, NC/AT  Respiratory: CTA bilaterally, mildly diminished at the bases, no tachypnea  Cardiovascular: mild tachycardia, regular rhythm, no murmur  GI: diffusely tender to palpation throughout, soft, distended  Skin: no rash  Musculoskeletal: pitting edema of the LE's w/ associated tenderness to palpation, no rash or  erythema  Neurologic: no asterixis, alert and oriented, no focal deficits noted    Data   Data reviewed today: I reviewed all medications, new labs and imaging results over the last 24 hours.    Recent Labs   Lab 08/13/21  1807   WBC 18.1*   HGB 9.3*   MCV 99      INR 1.72*      POTASSIUM 3.3*   CHLORIDE 106   CO2 23   BUN 8   CR 0.69   ANIONGAP 8   MELODY 7.6*   *   ALBUMIN 1.7*   PROTTOTAL 6.7*   BILITOTAL 1.1   ALKPHOS 119   ALT 18   AST 21   LIPASE <10*     Recent Results (from the past 24 hour(s))   POC US ABDOMEN LIMITED    Impression    Limited Bedside Abdominal Ultrasound, performed and interpreted by me.     Indication: Abdominal swelling and suspected SBP. Evaluate for Free fluid.     With the patient in Trendelenburg, the RUQ, LUQ, (including the paracolic gutters) views were examined for free fluid. With the patient in reverse Trendelenburg, the suprapubic view was examined for free fluid.     Findings: Free fluid present in both upper quadrants and in the pelvis    IMPRESSION: Free fluid present as noted above.     US Lower Extremity Venous Duplex Bilateral    Impression    RESIDENT PRELIMINARY INTERPRETATION  IMPRESSION:.  1. No deep vein thrombosis in the right or left lower extremity.  2. Bilateral lower extremity subcutaneous edema

## 2021-08-14 NOTE — ED NOTES
[facility]   ED Nurse to Floor Handoff     Maricruz Lechuga is a 41 year old female who speaks English and lives with family members,  in a home  They arrived in the ED by car from home    ED Chief Complaint: Leg Swelling    ED Dx;   Final diagnoses:   Alcoholic cirrhosis of liver with ascites (H)   Abdominal pain, generalized   Gastrointestinal hemorrhage with hematemesis   Bilateral lower extremity edema         Needed?: No    Allergies: No Known Allergies.  Past Medical Hx: History reviewed. No pertinent past medical history.   Baseline Mental status: WDL  Current Mental Status changes: at basesline    Infection present or suspected this encounter: yes other abdominal?  Sepsis suspected: Yes  Isolation type: No active isolations  Patient tested for COVID 19 prior to admission: YES     Activity level - Baseline/Home:  Independent  Activity Level - Current:   Stand with Assist    Bariatric equipment needed?: No    In the ED these meds were given:   Medications   pantoprazole (PROTONIX) 80 mg in sodium chloride 0.9 % 100 mL infusion (8 mg/hr Intravenous New Bag 8/13/21 2310)   octreotide (sandoSTATIN) 1,250 mcg in sodium chloride 0.9 % 250 mL (50 mcg/hr Intravenous New Bag 8/14/21 0016)   morphine (PF) injection 4 mg (4 mg Intravenous Given 8/13/21 2234)   lidocaine 1 % 0.1-1 mL (has no administration in time range)   lidocaine (LMX4) cream (has no administration in time range)   sodium chloride (PF) 0.9% PF flush 3 mL (3 mLs Intracatheter Not Given 8/14/21 0015)   sodium chloride (PF) 0.9% PF flush 3 mL (has no administration in time range)   melatonin tablet 1 mg (has no administration in time range)   fluconazole (DIFLUCAN) tablet 200 mg (has no administration in time range)   lactulose (CHRONULAC) solution 10 g (has no administration in time range)   spironolactone (ALDACTONE) tablet 50 mg (has no administration in time range)   albumin human 25 % injection 100  g (has no administration in time range)   acetaminophen (TYLENOL) tablet 500 mg (has no administration in time range)   cefTRIAXone (ROCEPHIN) 2 g vial to attach to  ml bag for ADULTS or NS 50 ml bag for PEDS (has no administration in time range)   morphine (PF) injection 4 mg (4 mg Intravenous Given 8/13/21 2025)   sodium chloride 0.9% infusion ( Intravenous New Bag 8/13/21 2140)   pantoprazole (PROTONIX) IV push injection 40 mg (40 mg Intravenous Given 8/13/21 2237)   octreotide (sandoSTATIN) injection 50 mcg (50 mcg Intravenous Given 8/13/21 2241)   cefTRIAXone (ROCEPHIN) 2 g vial to attach to  ml bag for ADULTS or NS 50 ml bag for PEDS (0 g Intravenous Stopped 8/14/21 0053)   potassium chloride ER (KLOR-CON M) CR tablet 40 mEq (40 mEq Oral Given 8/13/21 2300)       Drips running?  Yes    Home pump  No    Current LDAs  Peripheral IV 08/13/21 Right Lower forearm (Active)   Number of days: 1       Peripheral IV 08/13/21 Left Upper forearm (Active)   Site Assessment WDL 08/13/21 2302   Number of days: 1       Labs results:   Labs Ordered and Resulted from Time of ED Arrival Up to the Time of Departure from the ED   COMPREHENSIVE METABOLIC PANEL - Abnormal; Notable for the following components:       Result Value    Potassium 3.3 (*)     Calcium 7.6 (*)     Glucose 136 (*)     Protein Total 6.7 (*)     Albumin 1.7 (*)     All other components within normal limits   CBC WITH PLATELETS AND DIFFERENTIAL - Abnormal; Notable for the following components:    WBC Count 18.1 (*)     RBC Count 3.00 (*)     Hemoglobin 9.3 (*)     Hematocrit 29.8 (*)     MCHC 31.2 (*)     RDW 15.6 (*)     Absolute Neutrophils 14.2 (*)     Absolute Immature Granulocytes 0.1 (*)     All other components within normal limits   LACTIC ACID WHOLE BLOOD - Abnormal; Notable for the following components:    Lactic Acid 2.7 (*)     All other components within normal limits   INR - Abnormal; Notable for the following components:    INR 1.72  (*)     All other components within normal limits   LIPASE - Abnormal; Notable for the following components:    Lipase <10 (*)     All other components within normal limits   CELL COUNT BODY FLUID - Abnormal; Notable for the following components:    Color Yellow (*)     All other components within normal limits    Narrative:     No reference ranges have been established.  This result  should be interpreted in the context of the patient's clinical condition and   compared to simultaneous measurement in the patient's blood.         COVID-19 VIRUS (CORONAVIRUS) BY PCR - Normal    Narrative:     Testing was performed using the Xpert Xpress SARS-CoV-2 Assay on the  Cepheid Gene-Xpert Instrument Systems. Additional information about  this Emergency Use Authorization (EUA) assay can be found via the Lab  Guide. This test should be ordered for the detection of SARS-CoV-2 in  individuals who meet SARS-CoV-2 clinical and/or epidemiological  criteria. Test performance is unknown in asymptomatic patients. This  test is for in vitro diagnostic use under the FDA EUA for  laboratories certified under CLIA to perform high complexity testing.  This test has not been FDA cleared or approved. A negative result  does not rule out the presence of PCR inhibitors in the specimen or  target RNA in concentration below the limit of detection for the  assay. The possibility of a false negative should be considered if  the patient's recent exposure or clinical presentation suggests  COVID-19. This test was validated by the [facility]. This laboratory is certified under  the Clinical Laboratory Improvement Amendments of 1988 (CLIA-88) as  qualified to perform high complexity laboratory testing.     EXTRA BLUE TOP TUBE   EXTRA RED TOP TUBE   EXTRA GREEN TOP (LITHIUM HEPARIN) TUBE   EXTRA PURPLE TOP TUBE   CBC WITH PLATELETS & DIFFERENTIAL    Narrative:     The following orders were created for  panel order CBC with platelets differential.  Procedure                               Abnormality         Status                     ---------                               -----------         ------                     CBC with platelets and d...[[id number]]  Abnormal            Final result                 Please view results for these tests on the individual orders.   EXTRA PURPLE TOP TUBE   PROTEIN FLUID    Narrative:     No reference ranges have been established.  This result should be interpreted in the context of the patient's clinical condition and compared to simultaneous measurement in the patient's blood.   ALBUMIN FLUID    Narrative:     No reference ranges have been established.  This result should be interpreted in the context of the patient's clinical condition and compared to simultaneous measurement in the patient's blood.   DIFERENTIAL BODY FLUID    Narrative:     No reference ranges have been established.  This result   should be interpreted in the context of the patient's clinical condition and   compared to simultaneous measurement in the patient's blood.   ROUTINE UA WITH MICROSCOPIC REFLEX TO CULTURE   PULSE OXIMETRY NURSING   CARDIAC CONTINUOUS MONITORING   NURSING DRAW AND HOLD   IP ASSIGN PROVIDER TEAM TO TREATMENT TEAM   VITAL SIGNS   PERIPHERAL IV CATHETER   INTAKE AND OUTPUT   DAILY WEIGHTS   PERIPHERAL IV CATHETER   TYPE AND SCREEN, ADULT   AEROBIC BACTERIAL CULTURE ROUTINE   BLOOD CULTURE   BLOOD CULTURE   EXTRA TUBE    Narrative:     The following orders were created for panel order [place] Draw.  Procedure                               Abnormality         Status                     ---------                               -----------         ------                     Extra Blue Top Tube[[id number]]                              Final result               Extra Red Top Tube[[id number]]                               Final result               Extra Green Top (Lithium...[[id number]]                       Final result               Extra Purple Top Tube[[id number]]                            Final result                 Please view results for these tests on the individual orders.   EXTRA TUBE    Narrative:     The following orders were created for panel order Extra Tube ([place] Draw).  Procedure                               Abnormality         Status                     ---------                               -----------         ------                     Extra Purple Top Tube[[id number]]                            Final result                 Please view results for these tests on the individual orders.   CELL COUNT WITH DIFFERENTIAL FLUID    Narrative:     The following orders were created for panel order Cell count with differential fluid.  Procedure                               Abnormality         Status                     ---------                               -----------         ------                     Cell Count Body Fluid[[id number]]        Abnormal            Final result               Differential Body Fluid[[id number]]                          Final result                 Please view results for these tests on the individual orders.   ABO/RH TYPE AND SCREEN    Narrative:     The following orders were created for panel order ABO/Rh type and screen.  Procedure                               Abnormality         Status                     ---------                               -----------         ------                     Adult Type and Screen[[id number]]                            Final result                 Please view results for these tests on the individual orders.       Imaging Studies:   Recent Results (from the past 24 hour(s))   POC US ABDOMEN LIMITED    Impression    Limited Bedside Abdominal Ultrasound, performed and interpreted by me.     Indication: Abdominal swelling and suspected SBP. Evaluate for Free fluid.     With the patient in Trendelenburg, the RUQ, LUQ, (including the  "paracolic gutters) views were examined for free fluid. With the patient in reverse Trendelenburg, the suprapubic view was examined for free fluid.     Findings: Free fluid present in both upper quadrants and in the pelvis    IMPRESSION: Free fluid present as noted above.     US Lower Extremity Venous Duplex Bilateral    Impression    RESIDENT PRELIMINARY INTERPRETATION  IMPRESSION:.  1. No deep vein thrombosis in the right or left lower extremity.  2. Bilateral lower extremity subcutaneous edema       Recent vital signs:   /83   Pulse 110   Temp 98.4  F (36.9  C) (Oral)   Resp 11   Ht 1.6 m (5' 3\")   Wt 104.3 kg (230 lb)   SpO2 100%   BMI 40.74 kg/m      Lokesh Coma Scale Score: 15 (08/14/21 0045)       Cardiac Rhythm: Normal Sinus  Pt needs tele? Yes  Skin/wound Issues: None    Code Status: Full Code    Pain control: fair    Nausea control: good    Abnormal labs/tests/findings requiring intervention: see epic    Family present during ED course? Yes   Family Comments/Social Situation comments: daughter    Tasks needing completion: third IV for albumin    Essie Hernández RN    [phone] [facility]      "

## 2021-08-14 NOTE — ED NOTES
Pt stated she goes to [facility], they told her her stomach swelling was first a cyst, then a ulcer, and finally told her she has liver disease. She gets her stomach drained weekly she stated. She says she has come here for a second opinion.

## 2021-08-14 NOTE — CONSULTS
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    Patient reports having one episode of hematemesis 1 day ago.  She reports tasting blood in the back of her throat and saw blood when she spit.  She had one episode of emesis prior to arrival with streaks of blood in the vomit.    Patient also reports having bright red blood per rectum, this is a chronic issue, and has noted blood when wiping recently.  She has not had a bowel movement for last 3-4 days.    Patient has chronic abdominal pain for the past six mnths.  Has been in the hospital multiple times for this. CT was last month without any defining etiology.    She also gets recurrent paracentesis.  Last paracentesis was on 8/2 with 4 and half liters removed.  No signs of SBP.  Repeat paracentesis one day ago without SBP.    Patient also had an EGD recently showing gastritis.  Is on PPI currently.    Patient in remission for acute alcoholic hepatitis in June 2021, was started on prednisolone, but patient did not complete the course.      Medical hx Surgical hx   Cirrhosis secondary to alcohol use  Morbid obesity  Tobacco use disorder  Alcohol use disorder  Hysterectomy       Medications  Current Facility-Administered Medications   Medication Dose Route Frequency     cefTRIAXone  2 g Intravenous At Bedtime     fluconazole  200 mg Oral Daily     lactulose  10 g Oral BID     sodium chloride (PF)  3 mL Intracatheter Q8H     spironolactone  50 mg Oral Daily         Allergies  No Known Allergies    Family hx Social hx   History reviewed. No pertinent family history.   Social History     Tobacco Use      "Smoking status: Current Every Day Smoker     Smokeless tobacco: Never Used   Substance Use Topics     Alcohol use: Not Currently     Drug use: Not Currently   Continues to smoke 1 to 2 cigarettes/day    Last alcohol use was about 2 months ago per chart review.  Patient states that this was about a year ago.         Review of systems  A 10-point review of systems was negative.      Examination  /59   Pulse 105   Temp 97.4  F (36.3  C) (Oral)   Resp 16   Ht 1.6 m (5' 3\")   Wt 104.3 kg (230 lb)   SpO2 97%   BMI 40.74 kg/m      Intake/Output Summary (Last 24 hours) at 8/14/2021 0633  Last data filed at 8/14/2021 0053  Gross per 24 hour   Intake 100 ml   Output --   Net 100 ml       Gen-chronically ill-appearing woman, laying in bed, obese, sleeping but arouses to voice easily  Eye- EOMI  ENT- MMM  Lym- no palpable LAD  CVS- RRR  RS- CTA  Abd-abdomen is obese and distended, not tender to palpation on the right lower and right upper quadrant,.  No rebound or guarding  Extr-bilateral lower extremity edema is present, looks myxedematous  Neuro- no asterixis  Skin- no rash  Psych-flat affect    Laboratory  Lab Results   Component Value Date     08/13/2021    POTASSIUM 3.3 08/13/2021    CHLORIDE 106 08/13/2021    CO2 23 08/13/2021    BUN 8 08/13/2021    CR 0.69 08/13/2021       Lab Results   Component Value Date    BILITOTAL 1.1 08/13/2021    ALT 18 08/13/2021    AST 21 08/13/2021    ALKPHOS 119 08/13/2021       Lab Results   Component Value Date    ALBUMIN 1.7 08/13/2021    PROTTOTAL 6.7 08/13/2021        Lab Results   Component Value Date    WBC 18.1 08/13/2021    HGB 9.3 08/13/2021    MCV 99 08/13/2021     08/13/2021       Lab Results   Component Value Date    INR 1.72 08/13/2021     MELD-Na score: 13 at 8/13/2021  6:07 PM  MELD score: 13 at 8/13/2021  6:07 PM  Calculated from:  Serum Creatinine: 0.69 mg/dL (Using min of 1 mg/dL) at 8/13/2021  6:07 PM  Serum Sodium: 137 mmol/L at 8/13/2021  6:07 " PM  Total Bilirubin: 1.1 mg/dL at 8/13/2021  6:07 PM  INR(ratio): 1.72 at 8/13/2021  6:07 PM  Age: 41 years      Labs reviewed in care everywhere.  Since July 2021, her hemoglobin has been between 7 and 9.  Prior to that, in March 2021 hemoglobin was 12.  Macrocytosis present currently.  On admission here, her hemoglobin is 9.3, which is her baseline.  She also has had persistent leukocytosis since July.  Note that platelet count has been normal.    Electrolytes are normal.      Liver panel shows low total albumin, total protein of 6.7.  Interestingly, her total bilirubin, alk phos, AST and ALT are all normal.    Lactic acidosis noted.  2.7 on admission, 2.1 this morning.    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    # Macrocytic anemia  New in the last couple of months.  Has had a recent EGD as outpatient with gastritis.  Small amount of bleed reported on admission  -Please obtain outside EGD records  -Continue PPI p.o. twice daily for gastritis  -No role for octreotide at this time.  No reported history of varices.  -Hemodynamics have been stable.  No plans for repeat EGD urgently.  -We will repeat full anemia work-up per primary team including B12, folate, peripheral smear, LDH, haptoglobin, reticulocyte counts    #Decompensated liver cirrhosis with ascites, MELD Na 13  Has been drinking alcohol until recently.  Liver labs surprisingly look normal.  Low albumin and elevated INR reflective of synthetic dysfunction.  Renal function is intact.  Labs negative for SBP.    # Abdominal pain  Since chronic in nature.  Has right lower quadrant abdominal pain with drop in hemoglobin.  Would obtain CT abdomen pelvis with contrast to rule out any bleed.     GI will  continue to follow.  Please call with any hemodynamically significant bleeding episode.    Patient discussed with Dr. Sumit BASHIR MD  Gastroenterology Fellow  Division of Gastroenterology, Hepatology and Nutrition  [facility]  Text page Pager [phone]    The patient was seen and examined.  The above assessment and plan was developed jointly with the fellow.      Regan Arana MD

## 2021-08-14 NOTE — ED PROVIDER NOTES
[place] EMERGENCY DEPARTMENT ([facility])  8/13/21    History     Chief Complaint   Patient presents with     Leg Swelling     The history is provided by the patient and medical records.     Maricruz Lechuga is a 41 year old female who presents to the Emergency Department with leg swelling. Patient has a history of alcoholic cirrhosis with ascites.     Per chart review, patient has had multiple admissions over the past few months with abdominal pain. Patient most recently admitted at [facility] in [place], [place] on 7/31 with abdominal pain and bright red blood per rectum. Bright red blood determined to be due to hemorrhoid. Patient was placed on IV Rocephin prophylactically and paracentesis was ordered. She underwent paracentesis on 8/2 with 4.4L fluid removed without evidence for SBP. Rocephin discontinued 8/2. Patient had EGD showing gastritis and PPI was started. Patient had previously been on course of prednisolone for acute alcoholic hepatitis in June but reported during this admission that she did not take this as prescribed. Urine culture grew candida albicans and patient was started on Diflucan x14 days. Of note, patient reports not drinking alcohol for 1 year, however, daughter reported no drinking for 2 months. Patient was discharged on 8/4.    Today, patient reports leg swelling since leaving the hospital in [place] about 1.5 weeks ago. She notes ongoing diffuse abdominal pain for the past several months as well. Patient reports she has had multiple paracenteses, but states these are not scheduled. Patient reports vomiting blood last night with clots. She states she has had 2 episodes of vomiting today that have been bloody but less red than previous night. She did not drink anything red. Patient denies fevers or cough. Patient reports she lives in [place], [place]. She states her son in law and daughter drove her here to [facility] for a second opinion. Patient reports history of hysterectomy.  "No other abdominal surgery.  Denies fever, cough, chest pain, shortness of breath.  Abdominal pain is \"everywhere\".    EGD results from outside hospital:  DATE OF SURGERY: 08/03/2021    SURGEON: Paul Severson, MD  ASSISTANT: MAURO Mosqueda PA-C     PREOPERATIVE DIAGNOSIS: Anemia of uncertain etiology.     POSTOPERATIVE DIAGNOSIS:  1. Moderate diffuse gastritis.  2. No evidence of duodenal disease.   3. No evidence of erosive esophagitis or esophageal varices.   4. Hill grade 2 gastroesophageal valve without hiatal hernia.     OPERATIVE PROCEDURE: Esophagogastroduodenoscopy.      CT of the abdomen pelvis with IV contrast was done July 17, 2021 through the [facility] system.  Results are shown here:  IMPRESSION:   1. Hepatocellular disease/hepatic steatosis with sequela of portal venous   hypertension including splenomegaly, recanalization of the umbilical vein, and   mild-to-moderate ascites.  Increasing moderate anasarca.   2. Otherwise, no other acute process within the abdomen or pelvis.   3. Stable ill-defined hypoattenuating areas within the anterior left hepatic   lobe.  This could represent more focal fatty deposition although nonemergent   MRI of the liver is recommended for further evaluation.      Past Medical History  History reviewed. No pertinent past medical history.  History reviewed. No pertinent surgical history.  fluconazole (DIFLUCAN) 200 MG tablet  lactulose (CHRONULAC) 10 GM/15ML solution  spironolactone (ALDACTONE) 50 MG tablet      No Known Allergies  Family History  History reviewed. No pertinent family history.  Social History   Social History     Tobacco Use     Smoking status: Current Every Day Smoker     Smokeless tobacco: Never Used   Substance Use Topics     Alcohol use: Not Currently     Drug use: Not Currently      Past medical history, past surgical history, medications, allergies, family history, and social history were reviewed with the patient. No additional pertinent items.     " "  Review of Systems   Constitutional: Negative for fever.   Respiratory: Negative for cough.    Cardiovascular: Positive for leg swelling.   Gastrointestinal: Positive for abdominal pain and vomiting.   All other systems reviewed and are negative.    A complete review of systems was performed with pertinent positives and negatives noted in the HPI, and all other systems negative.    Physical Exam   BP: (!) 149/93  Pulse: (!) 123  Temp: 98.2  F (36.8  C)  Resp: 18  Height: 160 cm (5' 3\")  Weight: 104.3 kg (230 lb)  SpO2: 100 %  Physical Exam    GEN:  Alert, well developed, no acute distress  HEENT:  PERRL, EOMI, Mucous membranes are moist.   Cardio: Regular tachycardia, no murmur, radial pulses equal bilaterally  PULM:  Lungs clear, good air movement, no wheezes, rales   Abd:  Soft, normal bowel sounds, there is diffuse abdominal tenderness  Musculoskeletal:  normal range of motion, there is bilateral lower extremity swelling with bilateral calf tenderness and pedal edema.  Neuro:  Alert and oriented X3, Follows commands, moving all extremities spontaneously   Skin:  Warm, dry   ED Course   8:07 PM  The patient was seen and examined by Trisha Snider MD in Room ED14.      [facility]    -Paracentesis    Date/Time: 8/13/2021 8:55 PM  Performed by: Trisha Snider MD  Authorized by: Trisha Snider MD     UNIVERSAL PROTOCOL   Site Marked: Yes  Prior Images Obtained and Reviewed:  NA  Required items: Required blood products, implants, devices and special equipment available    Patient identity confirmed:  Verbally with patient and arm band  NA - No sedation, light sedation, or local anesthesia  Confirmation Checklist:  Patient's identity using two indicators  Time out: Immediately prior to the procedure a time out was called    Universal Protocol: the Joint Commission Universal Protocol was followed    Preparation: Patient was prepped and draped in usual " sterile fashion          PRE-PROCEDURE DETAILS     Procedure purpose:  Diagnostic    ANESTHESIA (see MAR for exact dosages):     Anesthesia method:  None    PROCEDURE DETAILS     Needle gauge:  20    Ultrasound guidance: yes      Puncture site:  R lower quadrant    Fluid appearance:  Shellie and clear    Dressing:  Adhesive bandage    PROCEDURE   Patient Tolerance:  Patient tolerated the procedure well with no immediate complications        Results for orders placed during the hospital encounter of 08/13/21    POC US ABDOMEN LIMITED    Impression  Limited Bedside Abdominal Ultrasound, performed and interpreted by me.    Indication: Abdominal swelling and suspected SBP. Evaluate for Free fluid.    With the patient in Trendelenburg, the RUQ, LUQ, (including the paracolic gutters) views were examined for free fluid. With the patient in reverse Trendelenburg, the suprapubic view was examined for free fluid.    Findings: Free fluid present in both upper quadrants and in the pelvis    IMPRESSION: Free fluid present as noted above.         Labs are reviewed by me and results are shown below.     Results for orders placed or performed during the hospital encounter of 08/13/21   POC US ABDOMEN LIMITED     Status: None    Impression    Limited Bedside Abdominal Ultrasound, performed and interpreted by me.     Indication: Abdominal swelling and suspected SBP. Evaluate for Free fluid.     With the patient in Trendelenburg, the RUQ, LUQ, (including the paracolic gutters) views were examined for free fluid. With the patient in reverse Trendelenburg, the suprapubic view was examined for free fluid.     Findings: Free fluid present in both upper quadrants and in the pelvis    IMPRESSION: Free fluid present as noted above.     US Lower Extremity Venous Duplex Bilateral     Status: None (Preliminary result)    Impression    RESIDENT PRELIMINARY INTERPRETATION  IMPRESSION:.  1. No deep vein thrombosis in the right or left lower  extremity.  2. Bilateral lower extremity subcutaneous edema   Extra Blue Top Tube     Status: None   Result Value Ref Range    Hold Specimen JIC    Extra Red Top Tube     Status: None   Result Value Ref Range    Hold Specimen JIC    Extra Green Top (Lithium Heparin) Tube     Status: None   Result Value Ref Range    Hold Specimen JIC    Extra Purple Top Tube     Status: None   Result Value Ref Range    Hold Specimen JIC    CBC with platelets differential     Status: Abnormal    Narrative    The following orders were created for panel order CBC with platelets differential.  Procedure                               Abnormality         Status                     ---------                               -----------         ------                     CBC with platelets and d...[[id number]]  Abnormal            Final result                 Please view results for these tests on the individual orders.   Comprehensive metabolic panel     Status: Abnormal   Result Value Ref Range    Sodium 137 133 - 144 mmol/L    Potassium 3.3 (L) 3.4 - 5.3 mmol/L    Chloride 106 94 - 109 mmol/L    Carbon Dioxide (CO2) 23 20 - 32 mmol/L    Anion Gap 8 3 - 14 mmol/L    Urea Nitrogen 8 7 - 30 mg/dL    Creatinine 0.69 0.52 - 1.04 mg/dL    Calcium 7.6 (L) 8.5 - 10.1 mg/dL    Glucose 136 (H) 70 - 99 mg/dL    Alkaline Phosphatase 119 40 - 150 U/L    AST 21 0 - 45 U/L    ALT 18 0 - 50 U/L    Protein Total 6.7 (L) 6.8 - 8.8 g/dL    Albumin 1.7 (L) 3.4 - 5.0 g/dL    Bilirubin Total 1.1 0.2 - 1.3 mg/dL    GFR Estimate >90 >60 mL/min/1.73m2   CBC with platelets and differential     Status: Abnormal   Result Value Ref Range    WBC Count 18.1 (H) 4.0 - 11.0 10e3/uL    RBC Count 3.00 (L) 3.80 - 5.20 10e6/uL    Hemoglobin 9.3 (L) 11.7 - 15.7 g/dL    Hematocrit 29.8 (L) 35.0 - 47.0 %    MCV 99 78 - 100 fL    MCH 31.0 26.5 - 33.0 pg    MCHC 31.2 (L) 31.5 - 36.5 g/dL    RDW 15.6 (H) 10.0 - 15.0 %    Platelet Count 307 150 - 450 10e3/uL    % Neutrophils 76 %    %  Lymphocytes 14 %    % Monocytes 6 %    % Eosinophils 2 %    % Basophils 1 %    % Immature Granulocytes 1 %    NRBCs per 100 WBC 0 <1 /100    Absolute Neutrophils 14.2 (H) 1.6 - 8.3 10e3/uL    Absolute Lymphocytes 2.5 0.8 - 5.3 10e3/uL    Absolute Monocytes 1.0 0.0 - 1.3 10e3/uL    Absolute Eosinophils 0.3 0.0 - 0.7 10e3/uL    Absolute Basophils 0.1 0.0 - 0.2 10e3/uL    Absolute Immature Granulocytes 0.1 (H) <=0.0 10e3/uL    Absolute NRBCs 0.0 10e3/uL   Lactic acid whole blood STAT     Status: Abnormal   Result Value Ref Range    Lactic Acid 2.7 (H) 0.7 - 2.0 mmol/L   INR     Status: Abnormal   Result Value Ref Range    INR 1.72 (H) 0.85 - 1.15   Asymptomatic COVID-19 Virus (Coronavirus) by PCR Nasopharyngeal     Status: Normal    Specimen: Nasopharyngeal; Swab   Result Value Ref Range    SARS CoV2 PCR Negative Negative    Narrative    Testing was performed using the Xpert Xpress SARS-CoV-2 Assay on the  [employer]. Additional information about  this Emergency Use Authorization (EUA) assay can be found via the Lab  Guide. This test should be ordered for the detection of SARS-CoV-2 in  individuals who meet SARS-CoV-2 clinical and/or epidemiological  criteria. Test performance is unknown in asymptomatic patients. This  test is for in vitro diagnostic use under the FDA EUA for  laboratories certified under CLIA to perform high complexity testing.  This test has not been FDA cleared or approved. A negative result  does not rule out the presence of PCR inhibitors in the specimen or  target RNA in concentration below the limit of detection for the  assay. The possibility of a false negative should be considered if  the patient's recent exposure or clinical presentation suggests  COVID-19. This test was validated by the [facility]. This laboratory is certified under  the Clinical Laboratory Improvement Amendments of 1988 (CLIA-88) as  qualified to  perform high complexity laboratory testing.     Lipase     Status: Abnormal   Result Value Ref Range    Lipase <10 (L) 73 - 393 U/L   Extra Purple Top Tube     Status: None   Result Value Ref Range    Hold Specimen JIC    Protein fluid     Status: None   Result Value Ref Range    Protein Total Fluid 1.9 g/dL    Narrative    No reference ranges have been established.  This result should be interpreted in the context of the patient's clinical condition and compared to simultaneous measurement in the patient's blood.   Albumin fluid     Status: None   Result Value Ref Range    Albumin fluid 0.4 g/dL    Narrative    No reference ranges have been established.  This result should be interpreted in the context of the patient's clinical condition and compared to simultaneous measurement in the patient's blood.   Cell Count Body Fluid     Status: Abnormal (Preliminary result)   Result Value Ref Range    Color Yellow (A) Colorless    Clarity Clear Clear    Total Nucleated Cells 118 /uL    Narrative    No reference ranges have been established.  This result  should be interpreted in the context of the patient's clinical condition and   compared to simultaneous measurement in the patient's blood.         [place] Draw     Status: None    Narrative    The following orders were created for panel order [place] Draw.  Procedure                               Abnormality         Status                     ---------                               -----------         ------                     Extra Blue Top Tube[[id number]]                              Final result               Extra Red Top Tube[[id number]]                               Final result               Extra Green Top (Lithium...[[id number]]                      Final result               Extra Purple Top Tube[[id number]]                            Final result                 Please view results for these tests on the individual orders.   Extra Tube ([place] Draw)     Status:  None    Narrative    The following orders were created for panel order Extra Tube ([place] Draw).  Procedure                               Abnormality         Status                     ---------                               -----------         ------                     Extra Purple Top Tube[[id number]]                            Final result                 Please view results for these tests on the individual orders.   Cell count with differential fluid     Status: Abnormal (In process)    Narrative    The following orders were created for panel order Cell count with differential fluid.  Procedure                               Abnormality         Status                     ---------                               -----------         ------                     Cell Count Body Fluid[[id number]]        Abnormal            Preliminary result         Differential Body Fluid[[id number]]                          In process                   Please view results for these tests on the individual orders.   ABO/Rh type and screen     Status: None (In process)    Narrative    The following orders were created for panel order ABO/Rh type and screen.  Procedure                               Abnormality         Status                     ---------                               -----------         ------                     Adult Type and Screen[[id number]]                            In process                   Please view results for these tests on the individual orders.     Medications   pantoprazole (PROTONIX) 80 mg in sodium chloride 0.9 % 100 mL infusion (8 mg/hr Intravenous New Bag 8/13/21 2310)   octreotide (sandoSTATIN) 1,250 mcg in sodium chloride 0.9 % 250 mL (has no administration in time range)   morphine (PF) injection 4 mg (4 mg Intravenous Given 8/13/21 2234)   morphine (PF) injection 4 mg (4 mg Intravenous Given 8/13/21 2025)   sodium chloride 0.9% infusion ( Intravenous New Bag 8/13/21 2140)   pantoprazole  (PROTONIX) IV push injection 40 mg (40 mg Intravenous Given 8/13/21 2237)   octreotide (sandoSTATIN) injection 50 mcg (50 mcg Intravenous Given 8/13/21 2241)   cefTRIAXone (ROCEPHIN) 2 g vial to attach to  ml bag for ADULTS or NS 50 ml bag for PEDS (2 g Intravenous New Bag 8/13/21 2300)   potassium chloride ER (KLOR-CON M) CR tablet 40 mEq (40 mEq Oral Given 8/13/21 2300)        Assessments & Plan (with Medical Decision Making)   Patient presents with diffuse abdominal pain in the setting of known cirrhosis with ascites.  She has had this pain for couple of months now has had several hospital admissions at various outside hospitals over the last 1 to 2 months.  CT abdomen pelvis from a month ago results are shown above.  Since patient is stating that her pain is unchanged, I do not think repeat imaging would likely show anything new.  Recent EGD shows gastritis, no esophageal varices.  (It was difficult to find this report in the medical record, I had to do a lot of scrolling through discharge summary to find this.)  Ascites fluid cell count from today is not suggestive of SBP.  Patient is afebrile, tachycardic, but has normal blood pressure.  Patient reports increased bilateral leg swelling, ultrasounds are negative for DVT today.  She is reporting vomiting blood, so will admit to the medicine service for monitoring, IV Protonix, GI consult.  Medicine service also has started ceftriaxone and octreotide.      I have reviewed the nursing notes. I have reviewed the findings, diagnosis, plan and need for follow up with the patient.    New Prescriptions    No medications on file       Final diagnoses:   Alcoholic cirrhosis of liver with ascites (H)   Abdominal pain, generalized   Gastrointestinal hemorrhage with hematemesis   Bilateral lower extremity edema     I, Michaelle Jay, am serving as a trained medical scribe to document services personally performed by Trisha Snider MD, based on the provider's  statements to me.      I, Trisha Snider MD, was physically present and have reviewed and verified the accuracy of this note documented by Michaelle Jay.     --  Trisha Snider MD  [facility] EMERGENCY DEPARTMENT  8/13/2021     Trisha Snider MD  08/13/21 3640

## 2021-08-14 NOTE — CODE/RAPID RESPONSE
Rapid Response Team Note    Assessment   In assessment a rapid response was called on Maricruz Lechuga due to lactic acidosis. This presentation is likely due to decompensated alcoholic liver cirrhosis.     She did have a LA check  PM which was 2.7. Received albumin with improvement in lactate to 2.1; now lactic acid elevated again at 2.8. White count is down from yesterday.    Plan   -  CT a/p: already performed; read is pending  -  Consider further interventions pending CT read  -  The [facility] primary team was paged and currently awaiting a response.  -  Disposition: The patient will remain on the current unit. We will continue to monitor this patient closely.  -  Reassessment and plan follow-up will be performed by the primary team    HANS Arellano CNP  [facility] Job Code Contact #[phone]    Hospital Course   Brief Summary of events leading to rapid response:   Lactic acid 2.1    Admission Diagnosis:   Abdominal pain, generalized [R10.84]  Alcoholic cirrhosis of liver with ascites (H) [K70.31]  Bilateral lower extremity edema [R60.0]  Gastrointestinal hemorrhage with hematemesis [K92.0]    Physical Exam   Temp: (!) 96.7  F (35.9  C) Temp  Min: 96.7  F (35.9  C)  Max: 98.4  F (36.9  C)  Resp: 18 Resp  Min: 11  Max: 19  SpO2: 97 % SpO2  Min: 97 %  Max: 100 %  Pulse: 102 Pulse  Min: 102  Max: 114    No data recorded  BP: 115/59 Systolic (24hrs), Av , Min:108 , Max:126   Diastolic (24hrs), Av, Min:59, Max:83     I/Os: I/O last 3 completed shifts:  In: 100 [IV Piggyback:100]  Out: -      Exam:   General: chronically ill appearing.  No acute distress  Mental Status: baseline mental status.  LS: CTAB  CV: RRR  Abd: distended. Soft. Guarding RUQ    Significant Results and Procedures   Lactic Acid:   Recent Labs   Lab Test 21  0344 21  1959   LACT 2.8* 2.1* 2.7*     CBC:   Recent Labs   Lab Test 21  18121  1632 21  1351 21  0633   WBC  --   12.1* 12.0* 13.9*   HGB 8.4* 8.6* 7.8* 7.7*   HCT  --  27.6* 25.1* 24.5*   PLT  --  257 232 248        Sepsis Evaluation   The patient is not known to have an infection.  NO EVIDENCE OF SEPSIS at this time.  Vital sign, physical exam, and lab findings are due to ESLD.

## 2021-08-14 NOTE — PLAN OF CARE
RN assumed cares at [phone]     Vitals: VSS on RA.  Neuro: A&O x4. Speech is clear & logical.  Cardiac: WDL  Respiratory: LS clear bilaterally. Denies SOB.   GI/: Up to commode as needed. Pt reports hx of bloody stools, states last episode was 8/10/21. Pt also has hx of hematemesis, but none this shift. UA needs to be collected & sent to lab.  Skin: 2 RN skin check completed. Pt has small skin tear in L pannus. Scabs noted in RLE.   IV/Drains: Pt has 3 PIVs, 2 on left arm, 1 on right arm. Continuous infusions of octreotide & Protonix.   Activity: SBA in room.  Nutrition: Currently NPO  Pain: Pt reports 9/10 abdominal pain. IV morphine available q 1 hour.     Plan of Care: ED admit for increased leg swelling & abdominal pain. Pt has orders for social work consult to discuss POC w/ daughter Saba. Continue to monitor & follow POC.

## 2021-08-14 NOTE — PROGRESS NOTES
[facility]    Progress Note - Maroon 1 Service        Date of Admission:  8/13/2021    Assessment & Plan      Maricruz Lechuga is a 41 year old female with a PMHX significant for acute alcoholic hepatitis s/p steroids (pt did not complete the treatment), decompensated alcoholic cirrhosis with ascites and coagulopathy, morbid obesity, tobacco dependence, presented with abdominal pain and BLE swelling, hematemesis and melena and was found to have low hemoglobin.       #generalized abdominal pain  Patient has chronic generalized abdominal pain that has acutely worsened. Patient has had multiple admissions over the past month due to abdominal pain. She was hospitalized on 6/24/2021 at [facility] in [place] and placed on a 28-day course of prednisolone which patient did not complete. She was then admitted to [facility] on 7/8/2021 for ongoing pain and had an ER visit on 7/22/2021 again for ongoing abdominal pain. Previous CT scan in July 2021 showed hepatocellular disease and steatosis with portal hypertension including splenomegaly and recanalization of the umbilical vein also mild to moderate ascites and moderate anasarca. Patient has had several paracentesis. It was thought that her abdominal pain is likely due to her alcoholic hepatitis. She was prescribed Norco however she told me she was taking oxycodone to mange her pain PTA. On exam today she is diffusely tender. She did not have a BM in the past 3 days. She reported no improvement in her abdominal pain even after getting paracentesis.     -start oxycodone 5 mg Q6H PRN  -start IV dilaudid 0.5 mg Q3H PRN for break through pain  -start tylenol 650 mg Q8H  -CT A/P      #bilateral lower extremity edema  #?anasarca  Patient complained of bilateral lower extremity edema. PTA she was on spironolactone only which was stopped due to BROOKE. She also complained of bilateral lower extremity pain. No DVT  on dopplers. No signs of cellulitis. Patient also has ascites and has been frequently getting paracentesis,  concerns for anasarca. No known kidney or heart problem. She has alcoholic liver cirrhosis and albumin is low however other liver labs are normal.     -start IV lasix 20 mg ---titrate up  -TTE  -TSH  -Urine protein/creatinine  -UA        #hematemesis  #Hx of melena    Patient reported small volume hematemesis. On presentation she was vitally stable then became a little more tachycardic. Repeat CBC showed low Hb at 7.7 however all 3 cell lines were dropped, could be some what dilutional. No further episodes after admission. She was started on IV octreotide and pantoprazole. As per chart review she had a recent EGD that showed gastritis. GI consulted, awaiting recs.     -NPO  -continue IV ceftriaxone at least for 48 hours  -continue IV octreotide   -continue IV pantoprazole  -GI consulted, awaiting recs      #acute on chronic macrocytic anemia  Patient has borderline macrocytosis. Her baseline hb appears to be around 8-9. On admission her hb was stable however repeat AM labs showed a drop to 7.7. No ongoing bleeding but patient reported small volume hematemesis as above. She is hemodynamically stable. Concerned that another process might be driving the anemia, will do full work up.     -monitor Hb q6h  -peripheral smear  -vit b12  -folate  -retic count      #decompensated alcoholic cirrhosis  #hx of alcoholic hepatitis (s/p incomplete treatment)  Patient was recently diagnosed with acute alcoholic hepatitis. She was started on steroid that showed improvement however patient did not complete the steroid treatment. Her liver labs surprisingly looks normal.   No documented hx of esophageal varices. Recent EGD at the OSH showed gastritis. No prior HE as well however patient was on lactulose PTA. She has had large volume ascites and has undergone paracentesis several times. Her last paracentesis was on 08/02 at that  time 4.4 L were removed. No prior hx of SBP. She was not on SBP prophylaxis as well PTA.   MELD-Na score: 15 at 8/14/2021  6:33 AM  MELD score: 15 at 8/14/2021  6:33 AM  Calculated from:  Serum Creatinine: 0.70 mg/dL (Using min of 1 mg/dL) at 8/14/2021  6:33 AM  Serum Sodium: 137 mmol/L at 8/14/2021  6:33 AM  Total Bilirubin: 1.4 mg/dL at 8/14/2021  6:33 AM  INR(ratio): 1.97 at 8/14/2021  6:33 AM  Age: 41 years    -GI consulted, appreciated recs  -No immediate plan for EGD  -anemia work up as above  -increase lactulose to 20 gm TID  -hold diuretic  -abdominal US complete with dopplers  -therapeutic paracentesis, CAPS consult placed     #chronic leukocytosis  Could be due to alcoholic hepatitis. Vitally stable on admission. No fever or chills. No localizing s/s of infection.   -infectious work up including BCX, CXR, UA  -already on IV ceftriaxone due to UGI bleed  -monitor CBC    #Yeast cystitis  -resume PTA fluconazole    #tobacco dependence  Patient reported smoking 1-2 cigarettes.   -nicotine patch not indicated     Diet: NPO for Medical/Clinical Reasons Except for: Meds    DVT Prophylaxis: INR elevated, ambulation   Anderson Catheter: Not present  Fluids: NA  Central Lines: None  Code Status: Full Code      Disposition Plan   Expected discharge:2 days recommended to prior living arrangement once adequate pain management/ tolerating PO medications.     The patient's care was discussed with the Attending Physician, Dr. Rabago.    Janice Vega MD  [address]  Securely message with the [employer] Web Console (learn more here)  Text page via [employer] ([employer]) Paging/Directory  Please see sign in/sign out for up to date coverage information      ______________________________________________________________________    Interval History   No acute events overnight. Patient complained of ongoing abdominal pain. No more hematemesis. She said that she has been using 10 g of  lactulose at home but no BM in the past 3 days. She does not feel confuse. Her sleep pattern has not changed. She was not taking diuretic PTA.     Data reviewed today: I reviewed all medications, new labs and imaging results over the last 24 hours.     Physical Exam   Vital Signs: Temp: 97.4  F (36.3  C) Temp src: Oral BP: 108/59 Pulse: 105   Resp: 16 SpO2: 97 % O2 Device: None (Room air)    Weight: 230 lbs 0 oz  Constitutional: awake, alert, cooperative, no apparent distress, and appears stated age  Eyes: Lids and lashes normal, pupils equal, round and reactive to light, extra ocular muscles intact, sclera clear, conjunctiva normal  ENT: normocepalic, without obvious abnormality  Hematologic / Lymphatic: no cervical lymphadenopathy  Respiratory: No increased work of breathing, good air exchange, clear to auscultation bilaterally, no crackles or wheezing  Cardiovascular: normal rate, regula rhythm, no murmur  GI: obese, soft, generalized tenderness, bowel sounds present  Skin: no bruising or bleeding  Musculoskeletal: bilateral lower extremity edema 3-4+ extending up to the knees  Neurologic: awake, alert and oriented X3, no focal deficit  Neuropsychiatric: General: normal, calm and normal eye contact  Level of consciousness: alert / normal  Affect: normal    Data   Recent Labs   Lab 08/14/21  0633 08/13/21  1807   WBC 13.9* 18.1*   HGB 7.7* 9.3*    99    307   INR 1.97* 1.72*    137   POTASSIUM 3.5 3.3*   CHLORIDE 107 106   CO2 26 23   BUN 7 8   CR 0.70 0.69   ANIONGAP 4 8   MELODY 8.1* 7.6*   GLC 82 136*   ALBUMIN 2.8* 1.7*   PROTTOTAL 6.8 6.7*   BILITOTAL 1.4* 1.1   ALKPHOS 89 119   ALT 12 18   AST 22 21   LIPASE  --  <10*     Recent Results (from the past 24 hour(s))   POC US ABDOMEN LIMITED    Impression    Limited Bedside Abdominal Ultrasound, performed and interpreted by me.     Indication: Abdominal swelling and suspected SBP. Evaluate for Free fluid.     With the patient in Trendelenburg,  the RUQ, LUQ, (including the paracolic gutters) views were examined for free fluid. With the patient in reverse Trendelenburg, the suprapubic view was examined for free fluid.     Findings: Free fluid present in both upper quadrants and in the pelvis    IMPRESSION: Free fluid present as noted above.     US Lower Extremity Venous Duplex Bilateral    Narrative    EXAMINATION: US LOWER EXTREMITY VENOUS DUPLEX BILATERAL  8/13/2021  10:32 PM      CLINICAL HISTORY:   leg swelling     COMPARISON: None available        PROCEDURE COMMENTS: Ultrasound was performed of the deep venous system  of the right and left lower extremity using grayscale, color, and  spectral Doppler.    FINDINGS:  The common femoral, greater saphenous origin, femoral, popliteal, and  deep calf veins are visualized and are patent. Venous waveforms are  normal. There is normal response to compression. Subcutaneous edema  within the soft tissues overlying the right popliteal vein, posterior  tibial vein, left popliteal vein and left posterior tibial vein.      Impression    IMPRESSION:.  1. No deep vein thrombosis in the right or left lower extremity.  2. Bilateral lower extremity subcutaneous edema.    I have personally reviewed the examination and initial interpretation  and I agree with the findings.    CARLITOS ALVARADO MD         SYSTEM ID:  [id number]   XR Chest Port 1 View    Narrative    EXAM: XR CHEST PORT 1 VIEW  8/14/2021 7:45 AM      HISTORY: decompensated cirrhosis, rule out infection    COMPARISON: None.    FINDINGS: Single view of the chest. Trachea is midline.  Cardiomediastinal silhouette is within normal limits. Low inspiratory  lung volumes. No focal consolidative opacity, pleural effusion, or  pneumothorax.      Impression    IMPRESSION:  No acute airspace disease. Low inspiratory lung volumes.    I have personally reviewed the examination and initial interpretation  and I agree with the findings.    CARLITOS ALVARADO MD          SYSTEM ID:  [id number]

## 2021-08-15 ENCOUNTER — ANCILLARY PROCEDURE (OUTPATIENT)
Dept: ULTRASOUND IMAGING | Facility: CLINIC | Age: 41
End: 2021-08-15
Attending: STUDENT IN AN ORGANIZED HEALTH CARE EDUCATION/TRAINING PROGRAM
Payer: MEDICAID

## 2021-08-15 LAB
ALBUMIN SERPL-MCNC: 2.3 G/DL (ref 3.4–5)
ALP SERPL-CCNC: 93 U/L (ref 40–150)
ALT SERPL W P-5'-P-CCNC: 12 U/L (ref 0–50)
ANION GAP SERPL CALCULATED.3IONS-SCNC: 7 MMOL/L (ref 3–14)
APPEARANCE FLD: CLEAR
AST SERPL W P-5'-P-CCNC: 19 U/L (ref 0–45)
BILIRUB SERPL-MCNC: 1 MG/DL (ref 0.2–1.3)
BUN SERPL-MCNC: 5 MG/DL (ref 7–30)
CALCIUM SERPL-MCNC: 7.9 MG/DL (ref 8.5–10.1)
CHLORIDE BLD-SCNC: 110 MMOL/L (ref 94–109)
CO2 SERPL-SCNC: 22 MMOL/L (ref 20–32)
COLOR FLD: YELLOW
CREAT SERPL-MCNC: 0.81 MG/DL (ref 0.52–1.04)
ERYTHROCYTE [DISTWIDTH] IN BLOOD BY AUTOMATED COUNT: 15.7 % (ref 10–15)
GFR SERPL CREATININE-BSD FRML MDRD: >90 ML/MIN/1.73M2
GLUCOSE BLD-MCNC: 133 MG/DL (ref 70–99)
HCT VFR BLD AUTO: 25.3 % (ref 35–47)
HGB BLD-MCNC: 7.9 G/DL (ref 11.7–15.7)
HGB BLD-MCNC: 7.9 G/DL (ref 11.7–15.7)
HGB BLD-MCNC: 8.1 G/DL (ref 11.7–15.7)
HOLD SPECIMEN: NORMAL
INR PPP: 1.69 (ref 0.85–1.15)
LACTATE SERPL-SCNC: 2.2 MMOL/L (ref 0.7–2)
LYMPHOCYTES NFR FLD MANUAL: 51 %
MCH RBC QN AUTO: 31.6 PG (ref 26.5–33)
MCHC RBC AUTO-ENTMCNC: 31.2 G/DL (ref 31.5–36.5)
MCV RBC AUTO: 101 FL (ref 78–100)
MONOS+MACROS NFR FLD MANUAL: NORMAL %
NEUTS BAND NFR FLD MANUAL: 11 %
OTHER CELLS FLD MANUAL: 39 %
PLATELET # BLD AUTO: 259 10E3/UL (ref 150–450)
POTASSIUM BLD-SCNC: 3.7 MMOL/L (ref 3.4–5.3)
PROT SERPL-MCNC: 6.6 G/DL (ref 6.8–8.8)
RBC # BLD AUTO: 2.5 10E6/UL (ref 3.8–5.2)
SODIUM SERPL-SCNC: 139 MMOL/L (ref 133–144)
WBC # BLD AUTO: 11.4 10E3/UL (ref 4–11)
WBC # FLD AUTO: 90 /UL

## 2021-08-15 PROCEDURE — P9047 ALBUMIN (HUMAN), 25%, 50ML: HCPCS | Performed by: STUDENT IN AN ORGANIZED HEALTH CARE EDUCATION/TRAINING PROGRAM

## 2021-08-15 PROCEDURE — 85610 PROTHROMBIN TIME: CPT | Performed by: STUDENT IN AN ORGANIZED HEALTH CARE EDUCATION/TRAINING PROGRAM

## 2021-08-15 PROCEDURE — 250N000013 HC RX MED GY IP 250 OP 250 PS 637: Performed by: STUDENT IN AN ORGANIZED HEALTH CARE EDUCATION/TRAINING PROGRAM

## 2021-08-15 PROCEDURE — 99233 SBSQ HOSP IP/OBS HIGH 50: CPT | Mod: 25 | Performed by: INTERNAL MEDICINE

## 2021-08-15 PROCEDURE — 89051 BODY FLUID CELL COUNT: CPT | Performed by: STUDENT IN AN ORGANIZED HEALTH CARE EDUCATION/TRAINING PROGRAM

## 2021-08-15 PROCEDURE — 36415 COLL VENOUS BLD VENIPUNCTURE: CPT | Performed by: STUDENT IN AN ORGANIZED HEALTH CARE EDUCATION/TRAINING PROGRAM

## 2021-08-15 PROCEDURE — 250N000013 HC RX MED GY IP 250 OP 250 PS 637

## 2021-08-15 PROCEDURE — 250N000011 HC RX IP 250 OP 636: Performed by: STUDENT IN AN ORGANIZED HEALTH CARE EDUCATION/TRAINING PROGRAM

## 2021-08-15 PROCEDURE — 87205 SMEAR GRAM STAIN: CPT | Performed by: INTERNAL MEDICINE

## 2021-08-15 PROCEDURE — 87015 SPECIMEN INFECT AGNT CONCNTJ: CPT | Performed by: STUDENT IN AN ORGANIZED HEALTH CARE EDUCATION/TRAINING PROGRAM

## 2021-08-15 PROCEDURE — 80053 COMPREHEN METABOLIC PANEL: CPT | Performed by: STUDENT IN AN ORGANIZED HEALTH CARE EDUCATION/TRAINING PROGRAM

## 2021-08-15 PROCEDURE — 120N000002 HC R&B MED SURG/OB UMMC

## 2021-08-15 PROCEDURE — 85027 COMPLETE CBC AUTOMATED: CPT | Performed by: STUDENT IN AN ORGANIZED HEALTH CARE EDUCATION/TRAINING PROGRAM

## 2021-08-15 PROCEDURE — 83605 ASSAY OF LACTIC ACID: CPT | Performed by: STUDENT IN AN ORGANIZED HEALTH CARE EDUCATION/TRAINING PROGRAM

## 2021-08-15 PROCEDURE — 49083 ABD PARACENTESIS W/IMAGING: CPT | Performed by: STUDENT IN AN ORGANIZED HEALTH CARE EDUCATION/TRAINING PROGRAM

## 2021-08-15 PROCEDURE — 89050 BODY FLUID CELL COUNT: CPT | Performed by: STUDENT IN AN ORGANIZED HEALTH CARE EDUCATION/TRAINING PROGRAM

## 2021-08-15 PROCEDURE — 250N000013 HC RX MED GY IP 250 OP 250 PS 637: Performed by: INTERNAL MEDICINE

## 2021-08-15 PROCEDURE — 85018 HEMOGLOBIN: CPT | Performed by: STUDENT IN AN ORGANIZED HEALTH CARE EDUCATION/TRAINING PROGRAM

## 2021-08-15 RX ORDER — ALBUMIN (HUMAN) 12.5 G/50ML
50 SOLUTION INTRAVENOUS ONCE
Status: COMPLETED | OUTPATIENT
Start: 2021-08-15 | End: 2021-08-15

## 2021-08-15 RX ORDER — FUROSEMIDE 10 MG/ML
20 INJECTION INTRAMUSCULAR; INTRAVENOUS ONCE
Status: COMPLETED | OUTPATIENT
Start: 2021-08-15 | End: 2021-08-15

## 2021-08-15 RX ADMIN — LACTULOSE 20 G: 10 SOLUTION ORAL at 08:04

## 2021-08-15 RX ADMIN — OXYCODONE 5 MG: 5 TABLET ORAL at 07:27

## 2021-08-15 RX ADMIN — HYDROMORPHONE HYDROCHLORIDE 0.5 MG: 1 INJECTION, SOLUTION INTRAMUSCULAR; INTRAVENOUS; SUBCUTANEOUS at 12:21

## 2021-08-15 RX ADMIN — THIAMINE HCL TAB 100 MG 100 MG: 100 TAB at 08:04

## 2021-08-15 RX ADMIN — ALBUMIN HUMAN 50 G: 0.25 SOLUTION INTRAVENOUS at 15:25

## 2021-08-15 RX ADMIN — PANTOPRAZOLE SODIUM 40 MG: 40 TABLET, DELAYED RELEASE ORAL at 08:04

## 2021-08-15 RX ADMIN — HYDROMORPHONE HYDROCHLORIDE 0.5 MG: 1 INJECTION, SOLUTION INTRAMUSCULAR; INTRAVENOUS; SUBCUTANEOUS at 15:50

## 2021-08-15 RX ADMIN — LACTULOSE 20 G: 10 SOLUTION ORAL at 19:43

## 2021-08-15 RX ADMIN — OXYCODONE 5 MG: 5 TABLET ORAL at 14:16

## 2021-08-15 RX ADMIN — FOLIC ACID 1 MG: 1 TABLET ORAL at 08:04

## 2021-08-15 RX ADMIN — OXYCODONE 5 MG: 5 TABLET ORAL at 20:09

## 2021-08-15 RX ADMIN — HYDROMORPHONE HYDROCHLORIDE 0.5 MG: 1 INJECTION, SOLUTION INTRAMUSCULAR; INTRAVENOUS; SUBCUTANEOUS at 05:56

## 2021-08-15 RX ADMIN — HYDROMORPHONE HYDROCHLORIDE 0.5 MG: 1 INJECTION, SOLUTION INTRAMUSCULAR; INTRAVENOUS; SUBCUTANEOUS at 00:28

## 2021-08-15 RX ADMIN — FLUCONAZOLE 200 MG: 100 TABLET ORAL at 08:04

## 2021-08-15 RX ADMIN — HYDROMORPHONE HYDROCHLORIDE 0.5 MG: 1 INJECTION, SOLUTION INTRAMUSCULAR; INTRAVENOUS; SUBCUTANEOUS at 09:07

## 2021-08-15 RX ADMIN — ACETAMINOPHEN 650 MG: 325 TABLET, FILM COATED ORAL at 10:20

## 2021-08-15 RX ADMIN — PANTOPRAZOLE SODIUM 40 MG: 40 TABLET, DELAYED RELEASE ORAL at 15:50

## 2021-08-15 RX ADMIN — FUROSEMIDE 20 MG: 10 INJECTION, SOLUTION INTRAVENOUS at 10:20

## 2021-08-15 RX ADMIN — ACETAMINOPHEN 650 MG: 325 TABLET, FILM COATED ORAL at 18:09

## 2021-08-15 RX ADMIN — HYDROMORPHONE HYDROCHLORIDE 0.5 MG: 1 INJECTION, SOLUTION INTRAMUSCULAR; INTRAVENOUS; SUBCUTANEOUS at 20:58

## 2021-08-15 ASSESSMENT — ACTIVITIES OF DAILY LIVING (ADL)
ADLS_ACUITY_SCORE: 18

## 2021-08-15 ASSESSMENT — MIFFLIN-ST. JEOR: SCORE: 1839.13

## 2021-08-15 NOTE — PLAN OF CARE
RN assumed cares at [phone]     Vitals: VSS on RA.  Neuro: A&O x4.   Cardiac: WDL. Denies chest pain.  Respiratory: LS clear bilaterally. Denies SOB.  GI/: Up to commode as needed. No BM this shift. Denies N/V.  Skin: Intact.   IV/Drains: 2 L PIV & 1 R PIV all patent & SL.  Activity: SBA in room.  Labs: Hgb checks q 6 hours. 8.1 at 0000 check.   Nutrition: 2 g Na diet.   Pain: Pt reports 8/10 abdominal pain. 0.5 mg dilaudid administered x2 w/ relief.      Plan of Care: Continue to monitor for bloody stools or hematemesis & follow POC.

## 2021-08-15 NOTE — PLAN OF CARE
Writer assumed care of this pleasant patient at 1930 until 2330. Fully alert and oriented times 4. She has remained vitally stable. Ate well for supper. Resting after. Trying to have a BM. Constipated. Received scheduled dose of lactulose at 2000 and had large hard BM 30 minutes or so after that. No blood noted. Reports abdominal pain an 8 out of 10. Received oxycodone at 2119. Then antibiotic was set to infuse over 30 minutes. The plan was to check and see if pain was relieved after antibiotic infused and pt could have IV pain med PRN at that time. Patient fell asleep and did not call for further pain medication.

## 2021-08-15 NOTE — PLAN OF CARE
Pt A&O x4, Tachy other VSS. Triggered lactic came back 2.8. Team aware. No other interventions taken at this time. Up SBA to commode. PIV x3 SL. C/o abd pain. Given dilaudid IV x2 and oxycodone x1. CT, US of ABD, and chest XR done today. Waiting CT results. Pt had one episode of bloody stool with estimated blood loss of 30 mL. Tolerating low sodium diet. Needs UA. No emesis today. Small skin tear in L pannus. Hbg 8.4. On q6h hbg checks. Will continue to monitor.

## 2021-08-15 NOTE — PROGRESS NOTES
Brief Care Management Note     consult received due to pt's daughter Kennedi Lechuga requesting resources for a hotel while her Mom is hospitalized. Pt is returning home when stable, is not on hospice, has no cognitive impairments and there is no clinical need for pt to be at the hospital during her Mom's admission.     Call made to number listed in SW hand off [phone]; a man answered and denied pt's daughter being able to be reached at this number.    Call made to the number listed in the facesheet [phone]; disconnected.     TERRI Rowan on 8/15/2021 at 2:37 PM    Addendum: General hotel resources can be provided if daughter is able to be reached.     TERRI Rowan on 8/15/2021 at 2:39 PM

## 2021-08-15 NOTE — CONSULTS
Consult and Procedure Service - Procedure Note    Attending: Dr. Cal Gifford  Resident: none  Indication: ascites  Risk Assessment: low risk  Pre-procedure diagnosis: ascites  Post-procedure diagnosis: ascites  Procedure name: abdominal paracentesis    The risks and benefits of the procedure were explained to Ms. Maricruz Lechuga who expressed understanding and opted to proceed.  Consent was obtained and placed in the chart.  A time out was performed.  An area of ascites was located with ultrasound and marked in the righ lower quadrant; the area was prepped and draped in the usual sterile fashion.  5 ml of 1% lidocaine was instilled with a 22 gauge needle and ascites located under real-time guidance. 5000 ml of straw colored fluid was removed and sent for analysis and the area dressed.   Patient tolerated the procedure well. Please contact the Consult and Procedure Service if any complications or concerns arise.     Roberto Gifford D.O.  Internal Medicine, Hospitalist  [facility]  Pager: [phone]    DOS:  8/15/2021

## 2021-08-15 NOTE — PROGRESS NOTES
[facility]    Progress Note - Onofre 1 Service        Date of Admission:  8/13/2021    Assessment & Plan      Maricruz Lechuga is a 41 year old female with a PMHX significant for acute alcoholic hepatitis s/p steroids (pt did not complete the treatment), decompensated alcoholic cirrhosis with ascites and coagulopathy, morbid obesity, tobacco dependence, presented with abdominal pain and BLE swelling, hematemesis and melena and was found to have low hemoglobin.     #updates today  -stop IV ceftriaxone  -no need to trend lactate  -TTE pending   -paracentesis  -IV lasix 20 mg once  -completed 14 day course of fluconazole for UTI    #generalized abdominal pain  #Left hepatic lobe hypodense lesion  Patient has chronic generalized abdominal pain that has acutely worsened. Patient has had multiple admissions over the past month due to abdominal pain. She was hospitalized on 6/24/2021 at [facility] in [place] and placed on a 28-day course of prednisolone which patient did not complete. She was then admitted to [facility] on 7/8/2021 for ongoing pain and had an ER visit on 7/22/2021 again for ongoing abdominal pain. Previous CT scan in July 2021 showed hepatocellular disease and steatosis with portal hypertension including splenomegaly and recanalization of the umbilical vein also mild to moderate ascites and moderate anasarca. Repeat CT scan here showed the same finding and a hypodense liver lesion involving left hepatic lobe. Her prior hepatitis serologies were negative. Though she was positive for hep C antibody but her PCR was undetectable. Patient has had several paracentesis which did not improve her pain. Likely her abdominal pain could be due to her cirrhosis.     -start oxycodone 5 mg Q6H PRN  -start IV dilaudid 0.5 mg Q3H PRN for break through pain  -start tylenol 650 mg Q8H  -outpatient work up for liver  mass      #bilateral lower extremity  edema  #?anasarca  Patient complained of bilateral lower extremity edema. PTA she was on spironolactone only which was stopped due to BROOKE. She also complained of bilateral lower extremity pain. No DVT on dopplers. No signs of cellulitis. Patient also has ascites and has been frequently getting paracentesis,  concerns for anasarca. No known kidney or heart problem or any enteropathy.  She has alcoholic liver cirrhosis and albumin is low however other liver labs are normal. TSH wnl. No protein on UA however her urine protein/creatinien ratio is elevated. US abdomen also revealed normal kidneys.     -start IV lasix 20 mg ---titrate up  -TTE pending      #lactic acidosis  Likely 2/2 intravascular volume depletion in the setting of anasarca and decreased clearance due to liver disease. Less concerned about infection at this time  -okay to start abx if any HD instability   -no need to trend lactate    #acute on chronic macrocytic anemia  Patient has borderline macrocytosis. Her baseline hb appears to be around 8-9. No ongoing bleeding but patient reported small volume hematemesis as above. She is hemodynamically stable. Concerned that another process might be driving the anemia. Folate is low. Vit B12 wnl. Retic count is appropriate.       -monitor Hb daily  -peripheral smear pending     #decompensated alcoholic cirrhosis  #hx of alcoholic hepatitis (s/p incomplete treatment)  Patient was recently diagnosed with acute alcoholic hepatitis. She was started on steroid that showed improvement however patient did not complete the steroid treatment. Her liver labs surprisingly looks normal.   No documented hx of esophageal varices. Recent EGD at the OSH showed gastritis. No prior HE as well however patient was on lactulose PTA. She has had large volume ascites and has undergone paracentesis several times. Her last paracentesis was on 08/02 at that time 4.4 L were removed. No prior hx of SBP. She was not on SBP prophylaxis as  well PTA.   MELD-Na score: 12 at 8/15/2021  6:08 AM  MELD score: 12 at 8/15/2021  6:08 AM  Calculated from:  Serum Creatinine: 0.81 mg/dL (Using min of 1 mg/dL) at 8/15/2021  6:08 AM  Serum Sodium: 139 mmol/L (Using max of 137 mmol/L) at 8/15/2021  6:08 AM  Total Bilirubin: 1.0 mg/dL at 8/15/2021  6:08 AM  INR(ratio): 1.69 at 8/15/2021  6:08 AM  Age: 41 years    -GI consulted, appreciated recs  -No immediate plan for EGD  -anemia work up as above  -increase lactulose to 20 gm TID  -hold diuretic  -abdominal US complete with dopplers  -therapeutic paracentesis, CAPS consult placed       #hematemesis---resolved  #Hx of melena -----resolved  Patient reported small volume hematemesis. On presentation she was vitally stable then became a little more tachycardic. Repeat CBC showed low Hb at 7.7 however all 3 cell lines were dropped, could be some what dilutional. No further episodes after admission. She was started on IV octreotide and pantoprazole. As per chart review she had a recent EGD that showed gastritis. GI consulted, awaiting recs.     -2 gm sodium diet  -no need for Q6H hb check  -stopIV ceftriaxone  -continue PO pantoprazole   -GI consulted, appreciated recs    #chronic leukocytosis---improving  Could be due to alcoholic hepatitis. Vitally stable on admission. No fever or chills. No localizing s/s of infection.   -infectious work up including BCX, CXR, UA  -monitor CBC    #hx of Yeast cystitis  -s/p 14 day course of  fluconazole    #tobacco dependence  Patient reported smoking 1-2 cigarettes.   -nicotine patch not indicated     Diet: 2 Gram Sodium Diet    DVT Prophylaxis: INR elevated, ambulation   Anderson Catheter: Not present  Fluids: NA  Central Lines: None  Code Status: Full Code      Disposition Plan   Expected discharge:2 days recommended to prior living arrangement once adequate pain management/ tolerating PO medications.     The patient's care was discussed with the Attending Physician,   Gem.    MD Onofre Ray [address] [place] [address]  Securely message with the [employer] Web Console (learn more here)  Text page via [employer] Paging/Directory  Please see sign in/sign out for up to date coverage information      ______________________________________________________________________    Interval History    Rapid called last evening. Lactate was high at 2.4. No fluids were given as patient already have anasarca. This morning lactate was down to 2.2. Reports that abdominal pain may be slightly better.had a large BM without any bleeding. No nausea or vomiting.     4 point ROS is negative.     Data reviewed today: I reviewed all medications, new labs and imaging results over the last 24 hours.     Physical Exam   Vital Signs: Temp: (!) 96.4  F (35.8  C) Temp src: Oral BP: 111/62 Pulse: 92   Resp: 16 SpO2: 98 % O2 Device: None (Room air)    Weight: 263 lbs 14.25 oz  Constitutional: awake, alert, cooperative, no apparent distress, and appears stated age  Eyes: Lids and lashes normal, pupils equal, round and reactive to light, extra ocular muscles intact, sclera clear, conjunctiva normal  ENT: normocepalic, without obvious abnormality  Hematologic / Lymphatic: no cervical lymphadenopathy  Respiratory: No increased work of breathing, good air exchange, clear to auscultation bilaterally, no crackles or wheezing  Cardiovascular: normal rate, regula rhythm, no murmur  GI: obese, soft, generalized tenderness, bowel sounds present  Skin: no bruising or bleeding  Musculoskeletal: bilateral lower extremity edema 3-4+ extending up to the knees  Neurologic: awake, alert and oriented X3, no focal deficit  Neuropsychiatric: General: normal, calm and normal eye contact  Level of consciousness: alert / normal  Affect: normal    Data   Recent Labs   Lab 08/15/21  0608 08/15/21  0032 08/14/21  1815 08/14/21  1632 08/14/21  1351 08/14/21  0633 08/13/21  1807   WBC 11.4*  --   --   12.1* 12.0* 13.9* 18.1*   HGB 7.9*  7.9* 8.1* 8.4* 8.6* 7.8* 7.7* 9.3*   *  --   --  100 100 100 99     --   --  257 232 248 307   INR 1.69*  --   --   --   --  1.97* 1.72*     --   --   --   --  137 137   POTASSIUM 3.7  --   --   --   --  3.5 3.3*   CHLORIDE 110*  --   --   --   --  107 106   CO2 22  --   --   --   --  26 23   BUN 5*  --   --   --   --  7 8   CR 0.81  --   --   --   --  0.70 0.69   ANIONGAP 7  --   --   --   --  4 8   MELODY 7.9*  --   --   --   --  8.1* 7.6*   *  --   --   --   --  82 136*   ALBUMIN 2.3*  --   --   --   --  2.8* 1.7*   PROTTOTAL 6.6*  --   --   --   --  6.8 6.7*   BILITOTAL 1.0  --   --   --   --  1.4* 1.1   ALKPHOS 93  --   --   --   --  89 119   ALT 12  --   --   --   --  12 18   AST 19  --   --   --   --  22 21   LIPASE  --   --   --   --   --   --  <10*     Recent Results (from the past 24 hour(s))   CT Abdomen Pelvis w Contrast    Narrative    EXAMINATION: CT ABDOMEN PELVIS W CONTRAST, 8/14/2021 2:47 PM    TECHNIQUE:  Helical CT images from the lung bases through the  symphysis pubis were obtained with IV contrast. Contrast dose: Isovue  370    COMPARISON: None.    HISTORY: Abdominal pain, acute, nonlocalized    FINDINGS:    Abdomen and pelvis: 2.4 cm hypodense lesion in the left hepatic lobe  (series 2, image 24). No intrahepatic or extrahepatic biliary  dilatation. Gallbladder is not seen. Spleen is unremarkable. Moderate  fatty infiltration of the pancreas. Adrenal glands appear normal. No  evidence of hydronephrosis visualized ureters are unremarkable.  Bladder appears normal. Normal caliber small and large bowel without  evidence of obstruction.     Moderate to large volume ascites. Abdominal aorta is without evidence  of focal aneurysm. Origins of the celiac artery and superior  mesenteric arteries are grossly patent. Main portal vein and inferior  mesenteric vein are patent. Recanalized umbilical vein.    No abnormally enlarged lymph nodes in  the abdomen or pelvis.    Lung bases: Small areas of linear atelectasis in the left base. No  pleural effusion.    Bones and soft tissues: Diffuse anasarca. No suspicious osseous  lesions identified.      Impression    IMPRESSION:   1. Moderate volume ascites.  2. Recanalized umbilical vein suggestive of portal hypertension.  3. Diffuse anasarca.  4. Possible hypodense liver lesion in the left hepatic lobe measuring  up to 2.4 cm. Recommend further evaluation with multiphasic CT or  liver MRI.

## 2021-08-15 NOTE — PLAN OF CARE
Pt A&O x4, VSS on RA. Independent to commode. SBA while walking. BM x2. No signs of bleeding noted. Reymundo N/V. Paracentesis done today. Pt c/o increased pain after walk. Given oral oxycodone and IV dilaudid with some relief. Pt stated that pain started to creep up to 8/10 around 2 hours after given dilaudid. Team paged and ok'd to given tylenol a half hour early. Some oozing from R para site. Site reinforced. Given albumin and fluid sent to lab. IV lasix given. Tolerating 2g sodium diet. Lactic 2.2 this AM. 3 PIV SL.  No interventions needed at this time. Will continue to monitor.

## 2021-08-15 NOTE — PROVIDER NOTIFICATION
08/14/21 1900   Call Information   Date of Call 08/14/21   Time of Call 1800   Name of person requesting the team Vera Dai   Title of person requesting team RN   RRT Arrival time 1810   Time RRT ended 1830   Reason for call   Type of RRT Adult   Primary reason for call Sepsis suspected   Sepsis Suspected Elevated Lactate level   Was patient transferred from the ED, ICU, or PACU within last 24 hours prior to RRT call? No   SBAR   Situation LA-2.8,    Background ETOH cirrhosis with ascites,, GI bleed   Notable History/Conditions End-Stage disease;Hypertension   Assessment VSS, Alert and orientated   Interventions No interventions   Patient Outcome   Patient Outcome Stabilized on unit   RRT Team   Attending/Primary/Covering Physician [facility]   Date Attending Physician notified 08/14/21   Time Attending Physician notified 1935   Physician(s) Norma Last CNPP   Lead RN Maximilian WEAVER   RT NA   Post RRT Intervention Assessment   Post RRT Assessment Stable/Improved   Date Follow Up Done 08/14/21   Time Follow Up Done 2039

## 2021-08-16 ENCOUNTER — APPOINTMENT (OUTPATIENT)
Dept: CARDIOLOGY | Facility: CLINIC | Age: 41
End: 2021-08-16
Payer: MEDICAID

## 2021-08-16 ENCOUNTER — APPOINTMENT (OUTPATIENT)
Dept: PHYSICAL THERAPY | Facility: CLINIC | Age: 41
End: 2021-08-16
Payer: MEDICAID

## 2021-08-16 LAB
AMPHETAMINES UR QL SCN: ABNORMAL
ANION GAP SERPL CALCULATED.3IONS-SCNC: 5 MMOL/L (ref 3–14)
BARBITURATES UR QL: ABNORMAL
BENZODIAZ UR QL: ABNORMAL
BUN SERPL-MCNC: 4 MG/DL (ref 7–30)
CALCIUM SERPL-MCNC: 7.8 MG/DL (ref 8.5–10.1)
CANNABINOIDS UR QL SCN: ABNORMAL
CHLORIDE BLD-SCNC: 108 MMOL/L (ref 94–109)
CO2 SERPL-SCNC: 26 MMOL/L (ref 20–32)
COCAINE UR QL: ABNORMAL
CREAT SERPL-MCNC: 0.79 MG/DL (ref 0.52–1.04)
ERYTHROCYTE [DISTWIDTH] IN BLOOD BY AUTOMATED COUNT: 15.6 % (ref 10–15)
ETHANOL UR QL SCN: ABNORMAL
GFR SERPL CREATININE-BSD FRML MDRD: >90 ML/MIN/1.73M2
GLUCOSE BLD-MCNC: 125 MG/DL (ref 70–99)
HCT VFR BLD AUTO: 24.9 % (ref 35–47)
HGB BLD-MCNC: 7.8 G/DL (ref 11.7–15.7)
LVEF ECHO: NORMAL
MCH RBC QN AUTO: 31.5 PG (ref 26.5–33)
MCHC RBC AUTO-ENTMCNC: 31.3 G/DL (ref 31.5–36.5)
MCV RBC AUTO: 100 FL (ref 78–100)
OPIATES UR QL SCN: ABNORMAL
PATH REPORT.COMMENTS IMP SPEC: NORMAL
PATH REPORT.FINAL DX SPEC: NORMAL
PATH REPORT.MICROSCOPIC SPEC OTHER STN: NORMAL
PATH REPORT.MICROSCOPIC SPEC OTHER STN: NORMAL
PATH REPORT.RELEVANT HX SPEC: NORMAL
PCP UR QL SCN: ABNORMAL
PLATELET # BLD AUTO: 217 10E3/UL (ref 150–450)
POTASSIUM BLD-SCNC: 3.6 MMOL/L (ref 3.4–5.3)
RBC # BLD AUTO: 2.48 10E6/UL (ref 3.8–5.2)
SODIUM SERPL-SCNC: 139 MMOL/L (ref 133–144)
WBC # BLD AUTO: 11.4 10E3/UL (ref 4–11)

## 2021-08-16 PROCEDURE — 250N000013 HC RX MED GY IP 250 OP 250 PS 637: Performed by: INTERNAL MEDICINE

## 2021-08-16 PROCEDURE — 93306 TTE W/DOPPLER COMPLETE: CPT | Mod: 26 | Performed by: INTERNAL MEDICINE

## 2021-08-16 PROCEDURE — 93306 TTE W/DOPPLER COMPLETE: CPT

## 2021-08-16 PROCEDURE — 99233 SBSQ HOSP IP/OBS HIGH 50: CPT | Mod: GC | Performed by: INTERNAL MEDICINE

## 2021-08-16 PROCEDURE — 85027 COMPLETE CBC AUTOMATED: CPT | Performed by: STUDENT IN AN ORGANIZED HEALTH CARE EDUCATION/TRAINING PROGRAM

## 2021-08-16 PROCEDURE — 85060 BLOOD SMEAR INTERPRETATION: CPT | Performed by: PATHOLOGY

## 2021-08-16 PROCEDURE — 97161 PT EVAL LOW COMPLEX 20 MIN: CPT | Mod: GP

## 2021-08-16 PROCEDURE — 250N000013 HC RX MED GY IP 250 OP 250 PS 637: Performed by: STUDENT IN AN ORGANIZED HEALTH CARE EDUCATION/TRAINING PROGRAM

## 2021-08-16 PROCEDURE — 80048 BASIC METABOLIC PNL TOTAL CA: CPT | Performed by: STUDENT IN AN ORGANIZED HEALTH CARE EDUCATION/TRAINING PROGRAM

## 2021-08-16 PROCEDURE — 97530 THERAPEUTIC ACTIVITIES: CPT | Mod: GP

## 2021-08-16 PROCEDURE — 250N000011 HC RX IP 250 OP 636: Performed by: STUDENT IN AN ORGANIZED HEALTH CARE EDUCATION/TRAINING PROGRAM

## 2021-08-16 PROCEDURE — 36415 COLL VENOUS BLD VENIPUNCTURE: CPT | Performed by: STUDENT IN AN ORGANIZED HEALTH CARE EDUCATION/TRAINING PROGRAM

## 2021-08-16 PROCEDURE — 120N000002 HC R&B MED SURG/OB UMMC

## 2021-08-16 RX ORDER — GABAPENTIN 100 MG/1
100 CAPSULE ORAL 3 TIMES DAILY
Status: DISCONTINUED | OUTPATIENT
Start: 2021-08-16 | End: 2021-08-21

## 2021-08-16 RX ORDER — DULOXETIN HYDROCHLORIDE 20 MG/1
20 CAPSULE, DELAYED RELEASE ORAL DAILY
Status: DISCONTINUED | OUTPATIENT
Start: 2021-08-16 | End: 2021-08-23 | Stop reason: HOSPADM

## 2021-08-16 RX ORDER — SPIRONOLACTONE 50 MG/1
50 TABLET, FILM COATED ORAL DAILY
Status: DISCONTINUED | OUTPATIENT
Start: 2021-08-16 | End: 2021-08-17

## 2021-08-16 RX ORDER — FUROSEMIDE 40 MG
40 TABLET ORAL DAILY
Status: DISCONTINUED | OUTPATIENT
Start: 2021-08-16 | End: 2021-08-23 | Stop reason: HOSPADM

## 2021-08-16 RX ORDER — PANTOPRAZOLE SODIUM 40 MG/1
40 TABLET, DELAYED RELEASE ORAL DAILY
COMMUNITY

## 2021-08-16 RX ORDER — POLYETHYLENE GLYCOL 3350 17 G/17G
17 POWDER, FOR SOLUTION ORAL DAILY
Status: DISCONTINUED | OUTPATIENT
Start: 2021-08-16 | End: 2021-08-17

## 2021-08-16 RX ORDER — ACETAMINOPHEN 325 MG/1
325 TABLET ORAL EVERY 6 HOURS PRN
COMMUNITY

## 2021-08-16 RX ORDER — EMOLLIENT BASE
CREAM (GRAM) TOPICAL EVERY 6 HOURS PRN
Status: DISCONTINUED | OUTPATIENT
Start: 2021-08-16 | End: 2021-08-23 | Stop reason: HOSPADM

## 2021-08-16 RX ADMIN — GABAPENTIN 100 MG: 100 CAPSULE ORAL at 13:34

## 2021-08-16 RX ADMIN — DULOXETINE 20 MG: 20 CAPSULE, DELAYED RELEASE ORAL at 17:39

## 2021-08-16 RX ADMIN — FOLIC ACID 1 MG: 1 TABLET ORAL at 07:42

## 2021-08-16 RX ADMIN — OXYCODONE 5 MG: 5 TABLET ORAL at 14:40

## 2021-08-16 RX ADMIN — LACTULOSE 20 G: 10 SOLUTION ORAL at 07:42

## 2021-08-16 RX ADMIN — HYDROMORPHONE HYDROCHLORIDE 0.5 MG: 1 INJECTION, SOLUTION INTRAMUSCULAR; INTRAVENOUS; SUBCUTANEOUS at 20:35

## 2021-08-16 RX ADMIN — OXYCODONE 5 MG: 5 TABLET ORAL at 02:22

## 2021-08-16 RX ADMIN — HYDROMORPHONE HYDROCHLORIDE 0.5 MG: 1 INJECTION, SOLUTION INTRAMUSCULAR; INTRAVENOUS; SUBCUTANEOUS at 03:18

## 2021-08-16 RX ADMIN — THIAMINE HCL TAB 100 MG 100 MG: 100 TAB at 07:42

## 2021-08-16 RX ADMIN — HYDROMORPHONE HYDROCHLORIDE 0.5 MG: 1 INJECTION, SOLUTION INTRAMUSCULAR; INTRAVENOUS; SUBCUTANEOUS at 11:05

## 2021-08-16 RX ADMIN — HYDROMORPHONE HYDROCHLORIDE 0.5 MG: 1 INJECTION, SOLUTION INTRAMUSCULAR; INTRAVENOUS; SUBCUTANEOUS at 14:36

## 2021-08-16 RX ADMIN — OXYCODONE 5 MG: 5 TABLET ORAL at 21:22

## 2021-08-16 RX ADMIN — DICLOFENAC SODIUM 2 G: 10 GEL TOPICAL at 15:45

## 2021-08-16 RX ADMIN — OXYCODONE 5 MG: 5 TABLET ORAL at 08:44

## 2021-08-16 RX ADMIN — HYDROMORPHONE HYDROCHLORIDE 0.5 MG: 1 INJECTION, SOLUTION INTRAMUSCULAR; INTRAVENOUS; SUBCUTANEOUS at 17:40

## 2021-08-16 RX ADMIN — GABAPENTIN 100 MG: 100 CAPSULE ORAL at 20:35

## 2021-08-16 RX ADMIN — SPIRONOLACTONE 50 MG: 50 TABLET, FILM COATED ORAL at 11:54

## 2021-08-16 RX ADMIN — HYDROMORPHONE HYDROCHLORIDE 0.5 MG: 1 INJECTION, SOLUTION INTRAMUSCULAR; INTRAVENOUS; SUBCUTANEOUS at 07:47

## 2021-08-16 RX ADMIN — FUROSEMIDE 40 MG: 40 TABLET ORAL at 11:06

## 2021-08-16 RX ADMIN — DICLOFENAC SODIUM 2 G: 10 GEL TOPICAL at 11:54

## 2021-08-16 RX ADMIN — DICLOFENAC SODIUM 2 G: 10 GEL TOPICAL at 20:34

## 2021-08-16 RX ADMIN — PANTOPRAZOLE SODIUM 40 MG: 40 TABLET, DELAYED RELEASE ORAL at 07:42

## 2021-08-16 RX ADMIN — PANTOPRAZOLE SODIUM 40 MG: 40 TABLET, DELAYED RELEASE ORAL at 15:45

## 2021-08-16 RX ADMIN — POLYETHYLENE GLYCOL 3350 17 G: 17 POWDER, FOR SOLUTION ORAL at 11:06

## 2021-08-16 ASSESSMENT — ACTIVITIES OF DAILY LIVING (ADL)
ADLS_ACUITY_SCORE: 18

## 2021-08-16 ASSESSMENT — MIFFLIN-ST. JEOR: SCORE: 1759.13

## 2021-08-16 NOTE — PROGRESS NOTES
"CLINICAL NUTRITION SERVICES - ASSESSMENT NOTE     Nutrition Prescription    RECOMMENDATIONS FOR MDs/PROVIDERS TO ORDER:  Continue with diet as tolerated     Malnutrition Status:    Severe malnutrition in the context of chronic condition     Recommendations already ordered by Registered Dietitian (RD):  Ordered Ensure plant base at 2:00 and HS    Future/Additional Recommendations:  Supplement tolerance        REASON FOR ASSESSMENT  Maricruz Lechuga is a/an 41 year old female assessed by the dietitian for Admission Nutrition Risk Screen for positive  (MST #3 wt loss + limited appetite) + MD consult for Malnutrition     Chart reviewed:  PMH: Decompensated alcoholic cirrhosis with ascites and coagulopathy.   --Admitted for chronic, progressive abdominal pain and BLE swelling, hematemesis. Unclear etiology, CT scan negative for source. Possibly discomfort due to anasarca/ascites, Plan for paracentesis and diuresis .   --last paracentesis was on 08/02 at that time 4.4 L were removed    NUTRITION HISTORY  Obtained from patient today: patient notes a good po and appetite PTA. Has ongoing abdominal pain, however continues to eat TID meals    CURRENT NUTRITION ORDERS  Diet: 2 g Sodium  Intake/Tolerance: tolerating diet well and is consuming 100% of meals per patient report. She is willing to try high protein supplements to optimize her nutrition.     LABS  BUN:5 (acute low)  WBC:13.7,     MEDICATIONS  Lasix    ANTHROPOMETRICS  Height: 160 cm (5' 3\")  Most Recent Weight: 104.3 kg admit wt on 8/13/21, driest wt on admit   IBW: 52.3 kg (199% IBW)  BMI:41 kg /m2  Obesity Grade III BMI >40  Weight History:   Wt Readings from Last 10 Encounters:   08/16/21 112 kg (246 lb 14.6 oz)       Dosing Weight: 65 kg adjusted wt from dry admit wt of 104.3 kg and IBW of 52.3 kg     ASSESSED NUTRITION NEEDS  Estimated Energy Needs: 2111-9051 kcals/day (20 - 25 kcals/kg)  Justification: Maintenance  Estimated Protein Needs: 78+ grams protein/day (1.2 " ++ grams of pro/kg)  Justification: Obesity guidelines, increase needs   Estimated Fluid Needs: Anasarca / ascites   Justification: Per provider pending fluid status    PHYSICAL FINDINGS  Bilateral lower extremity edema 3-4+ extending up to the knees    MALNUTRITION  % Intake: Decreased intake does not meet criteria  % Weight Loss: Weight loss does not meet criteria  Subcutaneous Fat Loss: None observed  Muscle Loss: Temporal: Mild, Upper arm (bicep, tricep): Moderate and Upper leg (quadricep, hamstring): Moderate  Fluid Accumulation/Edema: Moderate  Malnutrition Diagnosis: Severe malnutrition in the context of chronic condition     NUTRITION DIAGNOSIS  Altered GI related to large ascites with significant abdominal pain, may hinder patients ability to take sufficient PO as evidence by patients presentation, BUN acutely low     INTERVENTIONS  Implementation  Nutrition Education: discussed role of RD in nutrition POC, reviewed oral supplement availabilities   Medical food supplement therapy - Ensure plant base BID    Goals  Patient to consume % of nutritionally adequate meal trays TID, or the equivalent with supplements/snacks.     Monitoring/Evaluation  Progress toward goals will be monitored and evaluated per protocol.    Jocelyne Hoover RD/RICCO  Pager [phone]

## 2021-08-16 NOTE — PROGRESS NOTES
"Mtn-lu-gyheg progress note. Care from: 07:00 - 19:00     BP 97/52 (BP Location: Left arm)   Pulse 92   Temp 97.4  F (36.3  C) (Oral)   Resp 16   Ht 1.6 m (5' 3\")   Wt 120 kg (264 lb 8.8 oz)   SpO2 98%   BMI 46.86 kg/m       /41 (BP Location: Left arm)   Pulse 100   Temp 98.1  F (36.7  C) (Oral)   Resp 16   Ht 1.6 m (5' 3\")   Wt 120 kg (264 lb 8.8 oz)   SpO2 100%   BMI 46.86 kg/m          Vitals: VSS     Neuro: WDL ex somnolence today    o Pain: Continued abdominal pain, somewhat managed on current pain regimen. Voltaren gel and cymbalta added today   o Mood/Behavior: Calm, cooperative, withdrawn     Activity: Up with standby assist to and from commode, otherwise resting in bed today     Cardiovascular: WDL. No signs of bleeding.     Respiratory: WDL     GI & Nutrition: Fair appetite on low sodium diet   o Nausea: Denies   o Bowel Movement: No BM yet today. Lactulose and miralax given.     : WDL ex icteric urine. Pt on diuretics    Skin, Wounds & LDAs: Scattered bruising with jaundice. Paracentesis site was previously leaking copiously, now less so and redressed with gauze. PIV x 3 intact, saline locked.     Notable Labs: Hb stable. Leukocytosis.     Events & Plan Updates: Continue to monitor for bleeding, manage pain  "

## 2021-08-16 NOTE — PROGRESS NOTES
"   08/16/21 1600   Quick Adds   Type of Visit Initial PT Evaluation   Living Environment   People in home child(dayna), adult  (20 y.o. daughter)   Current Living Arrangements house   Home Accessibility no concerns   Transportation Anticipated family or friend will provide  (Daughter)   Living Environment Comments Pt lives in a house with her 20 y.o. daughter. All needs met on main level. Pt lives with her younger sister where she sleeps on the couch (does not have bedroom), kitchen and bathroom with tub shower on main level. No SANGITA and no stairs once in home.   Self-Care   Usual Activity Tolerance fair   Current Activity Tolerance poor   Regular Exercise No   Equipment Currently Used at Home walker, rolling   Activity/Exercise/Self-Care Comment Pt tries to go for walks although is limited by current medical status for recent PTA exercise. Has not regularly exercised since Jan 2021 2/2 medical status.    Disability/Function   Hearing Difficulty or Deaf no   Wear Glasses or Blind no   Concentrating, Remembering or Making Decisions Difficulty no   Difficulty Communicating no   Difficulty Eating/Swallowing no   Walking or Climbing Stairs Difficulty yes   Walking or Climbing Stairs ambulation difficulty, requires equipment;ambulation difficulty, assistance 1 person;stair climbing difficulty, dependent   Mobility Management uses a 4WW   Dressing/Bathing Difficulty yes   Dressing/Bathing bathing difficulty, assistance 1 person;bathing difficulty, requires equipment   Dressing/Bathing Management Daughter helps with bathing and she uses her 4WW   Toileting issues no   Doing Errands Independently Difficulty (such as shopping) no  (kaitliner helps)   Fall history within last six months yes   Number of times patient has fallen within last six months   (\"a lot of times, my legs gave out on me\")   Change in Functional Status Since Onset of Current Illness/Injury yes   General Information   Onset of Illness/Injury or Date of Surgery " "08/13/21   Referring Physician Janice Vega MD   Patient/Family Therapy Goals Statement (PT) \"To start walking again\"   Pertinent History of Current Problem (include personal factors and/or comorbidities that impact the POC) Per chart: Maricruz Lechuga is a 41 year old female with a PMHX significant for acute alcoholic hepatitis s/p steroids (pt did not complete the treatment), decompensated alcoholic cirrhosis with ascites and coagulopathy, morbid obesity, tobacco dependence, presented with abdominal pain and BLE swelling, hematemesis and melena and was found to have low hemoglobin.    Existing Precautions/Restrictions fall   General Observations Up with assist   Cognition   Orientation Status (Cognition) oriented x 4   Affect/Mental Status (Cognition) WFL   Follows Commands (Cognition) WNL   Cognitive Status Comments WNL   Pain Assessment   Patient Currently in Pain Yes, see Vital Sign flowsheet  (Abdominal and BLE)   Integumentary/Edema   Integumentary/Edema Comments Pt has diffuse swelling in BLE, shiny, flaky skin   Posture    Posture Forward head position;Protracted shoulders   Posture Comments Assessed in supine, sitting EOB and standing   Range of Motion (ROM)   ROM Quick Adds ROM WFL   Strength   Manual Muscle Testing Quick Adds Strength WFL   Bed Mobility   Comment (Bed Mobility) Supine<>sit, sit<>stand transfer observed from bed, pt SBA-Katie (FWW)   Transfers   Transfer Safety Comments SBA-Katie (FWW)   Gait/Stairs (Locomotion)   [place] Level (Gait) contact guard;modified independence   Assistive Device (Gait) walker, front-wheeled   Comment (Gait/Stairs) Pt walking from one side of bed to the other ~10' with CGA + LUE on footboard for balance.    Balance   Balance no deficits were identified   Sensory Examination   Sensory Perception patient reports no sensory changes   Coordination   Coordination no deficits were identified   Muscle Tone   Muscle Tone no deficits were identified   Clinical Impression "   Criteria for Skilled Therapeutic Intervention yes, treatment indicated   PT Diagnosis (PT) decreased functional mobility   Influenced by the following impairments impaired strength, balance, activity tolerance   Functional limitations due to impairments ambulating community distances, activity tolerance, functional mobility   Clinical Presentation Stable/Uncomplicated   Clinical Presentation Rationale Patient presentation and chart review   Clinical Decision Making (Complexity) low complexity   Therapy Frequency (PT) 5x/week   Predicted Duration of Therapy Intervention (days/wks) 5 days   Planned Therapy Interventions (PT) balance training;bed mobility training;gait training;neuromuscular re-education;strengthening;progressive activity/exercise   Anticipated Equipment Needs at Discharge (PT) commode chair;shower chair  (Pt requested a commode)   Risk & Benefits of therapy have been explained evaluation/treatment results reviewed;care plan/treatment goals reviewed;risks/benefits reviewed;participants included;patient   Clinical Impression Comments Pt is a 40 yo female who presents with strength, balance, and activity tolerance impairments who would benefit from skilled inpatient PT to improve ambulation distance and funcitonal mobility to return to OF.   PT Discharge Planning    PT Discharge Recommendation (DC Rec) Transitional Care Facility   PT Rationale for DC Rec Pt presents below baseline mobility, strength deficits that require skilled assist and intervention. Pt would benefit from continued therapy prior to discharge home as medical problem progresses.    PT Brief overview of current status  SBA for sit<>stand, CGA for walking with FWW. Please walk with patient in halls 2x/day.   Total Evaluation Time   Total Evaluation Time (Minutes) 15

## 2021-08-16 NOTE — PLAN OF CARE
D/I: Pt is alert and oriented x4, vitally stable on RA. Up to commode with SBA. BM x1. Bilateral LE edema. Reymundo nausea. C/o increased abd pain that radiates to legs especially when walking. Gets oxycodone and IV dilaudid with some relief. Right abd paracentesis site covered with dressing. 3 PIV SL.    P: Will continue to monitor and follow plan of care.

## 2021-08-16 NOTE — PROGRESS NOTES
"[facility]    Hepatology Follow-up    CC: Hematemesis    Dx: Decompensated alcoholic cirrhosis                  Macrocytic anemia    24 hour events:   JASPREET  Subjective:  C/o abd pain, had BM yesterday    Medications  Current Facility-Administered Medications   Medication Dose Route Frequency     acetaminophen  650 mg Oral Q8H     diclofenac  2 g Topical 4x Daily     folic acid  1 mg Oral Daily     furosemide  40 mg Oral Daily     gabapentin  100 mg Oral TID     pantoprazole  40 mg Oral BID AC     polyethylene glycol  17 g Oral Daily     sodium chloride (PF)  3 mL Intracatheter Q8H     spironolactone  50 mg Oral Daily     vitamin B1  100 mg Oral Daily       Review of systems  A 10-point review of systems was negative.    Examination  BP 97/52 (BP Location: Left arm)   Pulse 92   Temp 97.4  F (36.3  C) (Oral)   Resp 16   Ht 1.6 m (5' 3\")   Wt 120 kg (264 lb 8.8 oz)   SpO2 98%   BMI 46.86 kg/m      Intake/Output Summary (Last 24 hours) at 8/16/2021 1104  Last data filed at 8/16/2021 0845  Gross per 24 hour   Intake 630 ml   Output 400 ml   Net 230 ml       Constitutional: cooperative, pleasant  Eyes: Sclera anicteric  Ears/nose/mouth/throat: poor dentition  Neck: supple  CV: 1+ edema  Respiratory: Unlabored breathing  Abd: Mild distended, +bs, LUQ/RUQ and epigastric tender  Skin: warm, perfused, no jaundice  Neuro: AAO x 3, No asterixis    Laboratory  Lab Results   Component Value Date     08/16/2021    POTASSIUM 3.6 08/16/2021    CHLORIDE 108 08/16/2021    CO2 26 08/16/2021    BUN 4 08/16/2021    CR 0.79 08/16/2021       Lab Results   Component Value Date    BILITOTAL 1.0 08/15/2021    ALT 12 08/15/2021    AST 19 08/15/2021    ALKPHOS 93 08/15/2021       Lab Results   Component Value Date    WBC 11.4 08/16/2021    HGB 7.8 08/16/2021     08/16/2021     08/16/2021       Lab Results   Component Value Date    INR 1.69 08/15/2021       MELD-Na score: 12 at 8/16/2021  " 7:10 AM  MELD score: 12 at 8/16/2021  7:10 AM  Calculated from:  Serum Creatinine: 0.79 mg/dL (Using min of 1 mg/dL) at 8/16/2021  7:10 AM  Serum Sodium: 139 mmol/L (Using max of 137 mmol/L) at 8/16/2021  7:10 AM  Total Bilirubin: 1.0 mg/dL at 8/15/2021  6:08 AM  INR(ratio): 1.69 at 8/15/2021  6:08 AM  Age: 41 years    Radiology  US RUQ  8/14/21:  Impression:   1.  Liver demonstrates coarsened parenchymal echotexture which can be  seen in setting of chronic parenchymal disease.  2.  Moderate volume ascites.  3.  Patent right upper quadrant vasculature on Doppler evaluation.  4.  Gallbladder is not seen.  5.  Splenomegaly.      Assessment  41 year old with history of decompensated alcoholic cirrhosis, complicated by ascites, nicotine dependence, chronic abdominal pain who was admitted with concern for episode of hematemesis.    Macrocytic anemia  Patient came to the hospital for abdominal pain, she reported vomiting with some blood 2 days before admission.  Her hemoglobin is stable with slight downtrend no source of bleeding, last bowel movement was yesterday afternoon and was yellow.   She has macrocytic anemia secondary to alcohol and folic acid deficiency. If she has signs of ongoing GI bleeding, we may consider endoscopic evaluation.     Hx of alcoholic hepatitis  Pt had alcoholic hepatitis, was on steroids but didot finish the course. LFTs are not elevated and not suggestive of alc hepatitis anymore. Her US shows patent vasculature with antegrade flow, mild splenomegaly, less likley to have albumin slightly low but platelet count within normal limit, given the overall picture less likely to have cirrhosis and more consistent with recent alcoholic hepatitis.     Chronic abdominal pain  Patient has CT scan showing hypodense area in the liver which was not seen upon the ultrasound.  Her CT scan did not suggest any source of abdominal pain. 5L upon para yesterday, fluid analysis -ve for SBP.  Consider checking urine  tox to rule out other drugs as cause of chronic abdominal pain      Recommendations  -- Recommend to check LDH, haptoglobin, peripheral smear  -- Recommend to get records of recent EGD from OSH  -- Recommend triple phase CT/liver MRI inpatient/outpatient  -- Recommend Utox   -- PEth test pending  -- Continue to monitor for any signs of ongoing bleeding  -- Monitor Hgb and transfuse if <7  -- Continue PPI p.o. twice daily  -- May consider EGD depending upon clinical course  -- Continue antibiotic course for 5 days  -- Continue Gabapentin, would help with maintaining abstinence   -- Pain management as per primary team  -- Lactulose PO, Miralax BID  -- Monitor transaminases, bilirubin, INR  -- Ensure sodium restriction to 2000 mg per day  -- Continue Lasix at 40mg and spironolactone at 50mg  -- Adequate protein diet (1.2 - 1.5g/kg/day)   - Recommend multiple meals during the day, Snacks and shakes during the day/night   - Can use Ensure/Boost drinks TID                  - Dietician consultation  -- Chem dep consultation    Thank you for involving us in this patient's care. Please do not hesitate to contact the GI service with any questions or concerns.      Pt care plan discussed with Dr. Arana, Hepatology staff physician.    This note was created with voice recognition software, and while reviewed for accuracy, typos may remain.    Beatriz Yanez MD  [facility] Hepatology Fellow  Pager: [phone]

## 2021-08-16 NOTE — PHARMACY-ADMISSION MEDICATION HISTORY
"Pharmacy Admission Medication History    Admission medication history interview status for the 8/13/2021 admission is complete. See EPIC admission navigator for allergy information, prior to admission medications and immunization status.     Medication history interview source(s): Patient,     Medication history resources (including written lists, pill bottles, clinic record): Kiera    Medication history source reliability: Moderate    Primary pharmacy: [facility]    Pneumococcal and influenza vaccine history documented: no    Actions taken by pharmacist (provider contacted, medication changes, etc):None     Changes made to medication history:    Added to medication list: Tylenol, and Pantoprazole    Additional medication history information: Patient is a poor historian. States she does not remember \"which medication is which\". Also does not remember exact last doses. Fluconazole was a 14 day course started on 8/4 per dispense report    Medication reconciliation/reorder completed by provider prior to medication history? Yes    Time spent in this activity: 30 minutes    Prior to Admission medications    Medication Sig Last Dose Taking? Auth Provider   acetaminophen (TYLENOL) 325 MG tablet Take 325 mg by mouth every 6 hours as needed for mild pain Past Week at Unknown time Yes Unknown, Entered By History   fluconazole (DIFLUCAN) 200 MG tablet Take 200 mg by mouth daily Past Week at Unknown time Yes Reported, Patient   lactulose (CHRONULAC) 10 GM/15ML solution Take 10 g by mouth 2 times daily Past Week at Unknown time Yes Reported, Patient   pantoprazole (PROTONIX) 40 MG EC tablet Take 40 mg by mouth daily Past Week at Unknown time Yes Unknown, Entered By History   spironolactone (ALDACTONE) 50 MG tablet Take 50 mg by mouth daily Past Week at Unknown time Yes Reported, Patient       "

## 2021-08-17 ENCOUNTER — APPOINTMENT (OUTPATIENT)
Dept: OCCUPATIONAL THERAPY | Facility: CLINIC | Age: 41
End: 2021-08-17
Attending: INTERNAL MEDICINE
Payer: MEDICAID

## 2021-08-17 LAB
ANION GAP SERPL CALCULATED.3IONS-SCNC: 4 MMOL/L (ref 3–14)
BASOPHILS # BLD AUTO: 0.1 10E3/UL (ref 0–0.2)
BASOPHILS NFR BLD AUTO: 0 %
BUN SERPL-MCNC: 5 MG/DL (ref 7–30)
CALCIUM SERPL-MCNC: 8 MG/DL (ref 8.5–10.1)
CHLORIDE BLD-SCNC: 105 MMOL/L (ref 94–109)
CO2 SERPL-SCNC: 27 MMOL/L (ref 20–32)
CREAT SERPL-MCNC: 0.78 MG/DL (ref 0.52–1.04)
EOSINOPHIL # BLD AUTO: 0.5 10E3/UL (ref 0–0.7)
EOSINOPHIL NFR BLD AUTO: 4 %
ERYTHROCYTE [DISTWIDTH] IN BLOOD BY AUTOMATED COUNT: 15.7 % (ref 10–15)
ERYTHROCYTE [DISTWIDTH] IN BLOOD BY AUTOMATED COUNT: 15.8 % (ref 10–15)
GFR SERPL CREATININE-BSD FRML MDRD: >90 ML/MIN/1.73M2
GLUCOSE BLD-MCNC: 96 MG/DL (ref 70–99)
HAPTOGLOB SERPL-MCNC: 79 MG/DL (ref 32–197)
HCT VFR BLD AUTO: 25.7 % (ref 35–47)
HCT VFR BLD AUTO: 26.6 % (ref 35–47)
HGB BLD-MCNC: 8.1 G/DL (ref 11.7–15.7)
HGB BLD-MCNC: 8.4 G/DL (ref 11.7–15.7)
IMM GRANULOCYTES # BLD: 0.1 10E3/UL
IMM GRANULOCYTES NFR BLD: 0 %
LDH SERPL L TO P-CCNC: 160 U/L (ref 81–234)
LYMPHOCYTES # BLD AUTO: 2.7 10E3/UL (ref 0.8–5.3)
LYMPHOCYTES NFR BLD AUTO: 19 %
MCH RBC QN AUTO: 30.9 PG (ref 26.5–33)
MCH RBC QN AUTO: 31.3 PG (ref 26.5–33)
MCHC RBC AUTO-ENTMCNC: 31.5 G/DL (ref 31.5–36.5)
MCHC RBC AUTO-ENTMCNC: 31.6 G/DL (ref 31.5–36.5)
MCV RBC AUTO: 98 FL (ref 78–100)
MCV RBC AUTO: 99 FL (ref 78–100)
MONOCYTES # BLD AUTO: 0.9 10E3/UL (ref 0–1.3)
MONOCYTES NFR BLD AUTO: 6 %
NEUTROPHILS # BLD AUTO: 9.6 10E3/UL (ref 1.6–8.3)
NEUTROPHILS NFR BLD AUTO: 71 %
NRBC # BLD AUTO: 0 10E3/UL
NRBC BLD AUTO-RTO: 0 /100
PLATELET # BLD AUTO: 190 10E3/UL (ref 150–450)
PLATELET # BLD AUTO: 193 10E3/UL (ref 150–450)
POTASSIUM BLD-SCNC: 3.5 MMOL/L (ref 3.4–5.3)
RBC # BLD AUTO: 2.62 10E6/UL (ref 3.8–5.2)
RBC # BLD AUTO: 2.68 10E6/UL (ref 3.8–5.2)
RETICS # AUTO: 0.06 10E6/UL (ref 0.03–0.1)
RETICS/RBC NFR AUTO: 2.2 % (ref 0.5–2)
SODIUM SERPL-SCNC: 136 MMOL/L (ref 133–144)
WBC # BLD AUTO: 13.7 10E3/UL (ref 4–11)
WBC # BLD AUTO: 14.8 10E3/UL (ref 4–11)

## 2021-08-17 PROCEDURE — 250N000013 HC RX MED GY IP 250 OP 250 PS 637: Performed by: INTERNAL MEDICINE

## 2021-08-17 PROCEDURE — 80048 BASIC METABOLIC PNL TOTAL CA: CPT | Performed by: STUDENT IN AN ORGANIZED HEALTH CARE EDUCATION/TRAINING PROGRAM

## 2021-08-17 PROCEDURE — 85027 COMPLETE CBC AUTOMATED: CPT | Performed by: INTERNAL MEDICINE

## 2021-08-17 PROCEDURE — 85025 COMPLETE CBC W/AUTO DIFF WBC: CPT | Performed by: STUDENT IN AN ORGANIZED HEALTH CARE EDUCATION/TRAINING PROGRAM

## 2021-08-17 PROCEDURE — 36415 COLL VENOUS BLD VENIPUNCTURE: CPT | Performed by: STUDENT IN AN ORGANIZED HEALTH CARE EDUCATION/TRAINING PROGRAM

## 2021-08-17 PROCEDURE — 97165 OT EVAL LOW COMPLEX 30 MIN: CPT | Mod: GO | Performed by: OCCUPATIONAL THERAPIST

## 2021-08-17 PROCEDURE — 85045 AUTOMATED RETICULOCYTE COUNT: CPT | Performed by: INTERNAL MEDICINE

## 2021-08-17 PROCEDURE — 36415 COLL VENOUS BLD VENIPUNCTURE: CPT | Performed by: INTERNAL MEDICINE

## 2021-08-17 PROCEDURE — 97140 MANUAL THERAPY 1/> REGIONS: CPT | Mod: GO | Performed by: OCCUPATIONAL THERAPIST

## 2021-08-17 PROCEDURE — 120N000002 HC R&B MED SURG/OB UMMC

## 2021-08-17 PROCEDURE — 99233 SBSQ HOSP IP/OBS HIGH 50: CPT | Mod: GC | Performed by: INTERNAL MEDICINE

## 2021-08-17 PROCEDURE — 250N000013 HC RX MED GY IP 250 OP 250 PS 637: Performed by: STUDENT IN AN ORGANIZED HEALTH CARE EDUCATION/TRAINING PROGRAM

## 2021-08-17 PROCEDURE — 250N000011 HC RX IP 250 OP 636: Performed by: STUDENT IN AN ORGANIZED HEALTH CARE EDUCATION/TRAINING PROGRAM

## 2021-08-17 PROCEDURE — 83615 LACTATE (LD) (LDH) ENZYME: CPT | Performed by: INTERNAL MEDICINE

## 2021-08-17 PROCEDURE — 250N000011 HC RX IP 250 OP 636

## 2021-08-17 RX ORDER — HYDROMORPHONE HCL IN WATER/PF 6 MG/30 ML
0.2 PATIENT CONTROLLED ANALGESIA SYRINGE INTRAVENOUS ONCE
Status: COMPLETED | OUTPATIENT
Start: 2021-08-17 | End: 2021-08-17

## 2021-08-17 RX ORDER — POLYETHYLENE GLYCOL 3350 17 G/17G
17 POWDER, FOR SOLUTION ORAL 2 TIMES DAILY
Status: DISCONTINUED | OUTPATIENT
Start: 2021-08-17 | End: 2021-08-23 | Stop reason: HOSPADM

## 2021-08-17 RX ORDER — SPIRONOLACTONE 25 MG/1
50 TABLET ORAL ONCE
Status: COMPLETED | OUTPATIENT
Start: 2021-08-17 | End: 2021-08-17

## 2021-08-17 RX ORDER — SPIRONOLACTONE 100 MG/1
100 TABLET, FILM COATED ORAL DAILY
Status: DISCONTINUED | OUTPATIENT
Start: 2021-08-18 | End: 2021-08-23 | Stop reason: HOSPADM

## 2021-08-17 RX ADMIN — OXYCODONE 5 MG: 5 TABLET ORAL at 21:32

## 2021-08-17 RX ADMIN — DICLOFENAC SODIUM 2 G: 10 GEL TOPICAL at 07:34

## 2021-08-17 RX ADMIN — HYDROMORPHONE HYDROCHLORIDE 0.5 MG: 1 INJECTION, SOLUTION INTRAMUSCULAR; INTRAVENOUS; SUBCUTANEOUS at 03:29

## 2021-08-17 RX ADMIN — SPIRONOLACTONE 50 MG: 50 TABLET, FILM COATED ORAL at 07:36

## 2021-08-17 RX ADMIN — DICLOFENAC SODIUM 2 G: 10 GEL TOPICAL at 12:15

## 2021-08-17 RX ADMIN — PANTOPRAZOLE SODIUM 40 MG: 40 TABLET, DELAYED RELEASE ORAL at 07:33

## 2021-08-17 RX ADMIN — DICLOFENAC SODIUM 2 G: 10 GEL TOPICAL at 17:27

## 2021-08-17 RX ADMIN — GABAPENTIN 100 MG: 100 CAPSULE ORAL at 07:33

## 2021-08-17 RX ADMIN — THIAMINE HCL TAB 100 MG 100 MG: 100 TAB at 07:34

## 2021-08-17 RX ADMIN — HYDROMORPHONE HYDROCHLORIDE 0.5 MG: 1 INJECTION, SOLUTION INTRAMUSCULAR; INTRAVENOUS; SUBCUTANEOUS at 06:49

## 2021-08-17 RX ADMIN — SPIRONOLACTONE 50 MG: 25 TABLET, FILM COATED ORAL at 10:34

## 2021-08-17 RX ADMIN — ACETAMINOPHEN 650 MG: 325 TABLET, FILM COATED ORAL at 03:29

## 2021-08-17 RX ADMIN — DICLOFENAC SODIUM 2 G: 10 GEL TOPICAL at 21:32

## 2021-08-17 RX ADMIN — OXYCODONE 5 MG: 5 TABLET ORAL at 05:49

## 2021-08-17 RX ADMIN — FOLIC ACID 1 MG: 1 TABLET ORAL at 07:34

## 2021-08-17 RX ADMIN — HYDROMORPHONE HYDROCHLORIDE 0.5 MG: 1 INJECTION, SOLUTION INTRAMUSCULAR; INTRAVENOUS; SUBCUTANEOUS at 00:25

## 2021-08-17 RX ADMIN — FUROSEMIDE 40 MG: 40 TABLET ORAL at 07:34

## 2021-08-17 RX ADMIN — HYDROMORPHONE HYDROCHLORIDE 0.2 MG: 0.2 INJECTION, SOLUTION INTRAMUSCULAR; INTRAVENOUS; SUBCUTANEOUS at 18:32

## 2021-08-17 RX ADMIN — POLYETHYLENE GLYCOL 3350 17 G: 17 POWDER, FOR SOLUTION ORAL at 07:33

## 2021-08-17 RX ADMIN — GABAPENTIN 100 MG: 100 CAPSULE ORAL at 13:33

## 2021-08-17 RX ADMIN — HYDROMORPHONE HYDROCHLORIDE 0.5 MG: 1 INJECTION, SOLUTION INTRAMUSCULAR; INTRAVENOUS; SUBCUTANEOUS at 09:59

## 2021-08-17 RX ADMIN — PANTOPRAZOLE SODIUM 40 MG: 40 TABLET, DELAYED RELEASE ORAL at 17:27

## 2021-08-17 RX ADMIN — DULOXETINE 20 MG: 20 CAPSULE, DELAYED RELEASE ORAL at 07:36

## 2021-08-17 RX ADMIN — GABAPENTIN 100 MG: 100 CAPSULE ORAL at 21:32

## 2021-08-17 RX ADMIN — OXYCODONE 5 MG: 5 TABLET ORAL at 12:15

## 2021-08-17 ASSESSMENT — ACTIVITIES OF DAILY LIVING (ADL)
DEPENDENT_IADLS:: INDEPENDENT
ADLS_ACUITY_SCORE: 18

## 2021-08-17 ASSESSMENT — MIFFLIN-ST. JEOR: SCORE: 1752.71

## 2021-08-17 NOTE — CONSULTS
Care Management Initial Consult    General Information  Assessment completed with: Patient    Type of CM/SW Visit: Initial Assessment    Primary Care Provider verified and updated as needed: No   Readmission within the last 30 days: previous discharge plan unsuccessful   Return Category: Progression of disease  Reason for Consult: discharge planning  Advance Care Planning: Advance Care Planning Reviewed: no concerns identified. Pt does not have an existing healthcare directive.           Communication Assessment  Patient's communication style: spoken language (English or Bilingual)    Hearing Difficulty or Deaf: no   Wear Glasses or Blind: no    Cognitive  Cognitive/Neuro/Behavioral: WDL                      Living Environment:   People in home: Pt lives with her adult daughter and sister.     Current living Arrangements: house      Able to return to prior arrangements: no  Living Arrangement Comments:  (PT/OT are recommending TCU )    Family/Social Support:  Care provided by: self, child(dayna)  Provides care for: no one  Marital Status: Single  Children, Sibling(s)          Description of Support System: Supportive, Involved    Support Assessment: Adequate family and caregiver support, Adequate social supports    Current Resources:   Patient receiving home care services: No     Community Resources: None  Equipment currently used at home: walker, rolling  Supplies currently used at home: None    Employment/Financial:  Employment Status: disabled        Financial Concerns: No concerns identified           Lifestyle & Psychosocial Needs:  Social Determinants of Health     Tobacco Use: High Risk     Smoking Tobacco Use: Current Every Day Smoker     Smokeless Tobacco Use: Never Used   Alcohol Use:      Frequency of Alcohol Consumption:      Average Number of Drinks:      Frequency of Binge Drinking:    Financial Resource Strain:      Difficulty of Paying Living Expenses:    Food Insecurity:      Worried About Running Out  of Food in the Last Year:      Ran Out of Food in the Last Year:    Transportation Needs:      Lack of Transportation (Medical):      Lack of Transportation (Non-Medical):    Physical Activity:      Days of Exercise per Week:      Minutes of Exercise per Session:    Stress:      Feeling of Stress :    Social Connections:      Frequency of Communication with Friends and Family:      Frequency of Social Gatherings with Friends and Family:      Attends Yazidism Services:      Active Member of Clubs or Organizations:      Attends Club or Organization Meetings:      Marital Status:    Intimate Partner Violence:      Fear of Current or Ex-Partner:      Emotionally Abused:      Physically Abused:      Sexually Abused:    Depression:      PHQ-2 Score:    Housing Stability:      Unable to Pay for Housing in the Last Year:      Number of Places Lived in the Last Year:      Unstable Housing in the Last Year:        Functional Status:  Prior to admission patient needed assistance:   Dependent ADLs:: Bathing, Ambulation-walker  Dependent IADLs:: Independent  Assesssment of Functional Status: Not at baseline with ADL Functioning, Not at baseline with mobility, Not at  functional baseline, Needs placement in a SNF/TCF for rehabilitation    Mental Health Status:  Mental Health Status: No Current Concerns       Chemical Dependency Status:  Chemical Dependency Status: No Current Concerns             Values/Beliefs:  Spiritual, Cultural Beliefs, Yazidism Practices, Values that affect care: yes  Description of Beliefs that Will Affect Care:             Additional Information:  Per H&P: Maricruz Lechuga is a 41 year old female admitted on 8/13/2021. She has a history of alcoholic cirrhosis with ascites, morbid obesity, tobacco dependence, and prior alcohol abuse and is admitted for reported hematemesis in the setting of decompensated alcoholic liver cirrhosis, with concern for possible variceal bleed, in addition to chronic  ongoing abdominal pain and BLE swelling.    Met with pt in her room. Introduced self and role. Pt was lying in bed and able to participate in conversation. SW brought up TCU recommendation, and pt stated she thought this was a good idea. Pt lives in [place], [place] and requested referrals be made to the closest facilities to her home, located in [place], [place].     SW sent the following referrals:    [id number] [phone]    [facility] [id number] [phone]    Per med team, pt may be medically ready to discharge in 1-2 days. SW will continue to follow for discharge placement.     WENDY Riley, [facility] Center[facility]  [address]   [facility]  Phone: [phone]  Pager: [phone]

## 2021-08-17 NOTE — PROGRESS NOTES
"[facility]    Hepatology Follow-up    CC: Hematemesis    Dx: Decompensated alcoholic cirrhosis                  Macrocytic anemia    24 hour events:   JASPREET  Subjective:  C/o abd pain in the dependant gladys, RLQ and LLQ  BM yesterday    Medications  Current Facility-Administered Medications   Medication Dose Route Frequency    acetaminophen  650 mg Oral Q8H    diclofenac  2 g Topical 4x Daily    DULoxetine  20 mg Oral Daily    folic acid  1 mg Oral Daily    furosemide  40 mg Oral Daily    gabapentin  100 mg Oral TID    pantoprazole  40 mg Oral BID AC    polyethylene glycol  17 g Oral Daily    sodium chloride (PF)  3 mL Intracatheter Q8H    [START ON 8/18/2021] spironolactone  100 mg Oral Daily    vitamin B1  100 mg Oral Daily       Review of systems  A 10-point review of systems was negative.    Examination  /42 (BP Location: Left arm)   Pulse 89   Temp 97.6  F (36.4  C) (Oral)   Resp 18   Ht 1.6 m (5' 3\")   Wt 112 kg (246 lb 14.6 oz)   SpO2 97%   BMI 43.74 kg/m      Intake/Output Summary (Last 24 hours) at 8/16/2021 1104  Last data filed at 8/16/2021 0845  Gross per 24 hour   Intake 630 ml   Output 400 ml   Net 230 ml       Constitutional: cooperative, pleasant  Eyes: Sclera anicteric  Ears/nose/mouth/throat: poor dentition  Neck: supple  CV: 1+ edema  Respiratory: Unlabored breathing  Abd: Mild distended, +bs, LUQ/RUQ tender  Skin: warm, perfused, no jaundice  Neuro: AAO x 3, No asterixis    Laboratory  Lab Results   Component Value Date     08/16/2021    POTASSIUM 3.6 08/16/2021    CHLORIDE 108 08/16/2021    CO2 26 08/16/2021    BUN 4 08/16/2021    CR 0.79 08/16/2021       Lab Results   Component Value Date    BILITOTAL 1.0 08/15/2021    ALT 12 08/15/2021    AST 19 08/15/2021    ALKPHOS 93 08/15/2021       Lab Results   Component Value Date    WBC 11.4 08/16/2021    HGB 7.8 08/16/2021     08/16/2021     08/16/2021       Lab Results   Component Value Date "    INR 1.69 08/15/2021       MELD-Na score: 13 at 8/17/2021  5:44 AM  MELD score: 12 at 8/17/2021  5:44 AM  Calculated from:  Serum Creatinine: 0.78 mg/dL (Using min of 1 mg/dL) at 8/17/2021  5:44 AM  Serum Sodium: 136 mmol/L at 8/17/2021  5:44 AM  Total Bilirubin: 1.0 mg/dL at 8/15/2021  6:08 AM  INR(ratio): 1.69 at 8/15/2021  6:08 AM  Age: 41 years    Radiology  US RUQ  8/14/21:  Impression:   1.  Liver demonstrates coarsened parenchymal echotexture which can be  seen in setting of chronic parenchymal disease.  2.  Moderate volume ascites.  3.  Patent right upper quadrant vasculature on Doppler evaluation.  4.  Gallbladder is not seen.  5.  Splenomegaly.      Assessment  41 year old with history of decompensated alcoholic cirrhosis, complicated by ascites, nicotine dependence, chronic abdominal pain who was admitted with concern for episode of hematemesis.    Macrocytic anemia  Patient came to the hospital for abdominal pain, she reported vomiting with some blood 2 days before admission.  Her hemoglobin is stable with no source of bleeding. 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 She had recent EGD with gastritis, would not repeat unless overt bleeding.    Hx of alcoholic hepatitis/cirrhosis  Pt had alcoholic hepatitis, was on steroids but didot finish the course. LFTs are not elevated and not suggestive of alc hepatitis anymore. Her US shows patent vasculature with antegrade flow, mild splenomegaly, less likley to have albumin slightly low but platelet count within normal limit, given the overall picture less likely to have cirrhosis.     Chronic abdominal pain  Patient has CT scan showing hypodense area in the liver which was not seen upon the ultrasound.  Her CT scan did not suggest any source of abdominal pain. 5L upon para yesterday, fluid analysis -ve for SBP.  Unclear etiology of abd  pain.      Recommendations  -- Recommend to get records of recent EGD from OSH  -- Triple phase CT/liver MRI inpatient/outpatient  -- PEth test pending  -- Continue to monitor for any signs of ongoing bleeding  -- Monitor Hgb and transfuse if <7  -- Continue PPI p.o. twice daily  -- Continue antibiotic course for 5 days  -- Continue Gabapentin, would help with maintaining abstinence   -- Pain management as per primary team  -- Lactulose PO, Miralax BID  -- Monitor transaminases, bilirubin, INR  -- Ensure sodium restriction to 2000 mg per day  -- Continue Lasix at 40mg and spironolactone at 50mg  -- Adequate protein diet (1.2 - 1.5g/kg/day)   - Recommend multiple meals during the day, Snacks and shakes during the day/night   - Can use Ensure/Boost drinks TID  -- Chem dep consultation    Thank you for involving us in this patient's care. Please do not hesitate to contact the GI service with any questions or concerns.      Pt care plan discussed with Dr. Arana, Hepatology staff physician.    This note was created with voice recognition software, and while reviewed for accuracy, typos may remain.    Beatriz Yanez MD  Advance and Transplant Hepatology Fellow  Pager: [phone]

## 2021-08-17 NOTE — PLAN OF CARE
"/42 (BP Location: Left arm)   Pulse 89   Temp 97.6  F (36.4  C) (Oral)   Resp 18   Ht 1.6 m (5' 3\")   Wt 112 kg (246 lb 14.6 oz)   SpO2 97%   BMI 43.74 kg/m       Assumed Cares: [id number]  Neuro: A&O x 4  Respiratory: WDL  Cardiac: WDL  GI/: Voiding adequately with commode near bedside; 1 BM this shift  Skin: Generalized bruising; jaundiced; paracentesis dressing CDI  Lines: 2 L PIVs SL  Pain: Abdominal pain somewhat managed with dilaudid x 4 and oxycodone x 2  Diet: Low sodium diet; tolerating well  Activity Level: SBA  Events: Pt slept comfortably between cares  Plan: Continue with POC; notify provider with changes in pt condition      "

## 2021-08-17 NOTE — PROGRESS NOTES
[facility]    Progress Note - [facility] 1 Service        Date of Admission:  8/13/2021    Assessment & Plan      Maricruz Lechuga is a 41 year old female with a PMHX significant for acute alcoholic hepatitis s/p steroids (pt did not complete the treatment), decompensated alcoholic cirrhosis with ascites and coagulopathy, morbid obesity, tobacco dependence, presented with abdominal pain and BLE swelling, hematemesis and melena, both resolved, and was found to have low hemoglobin. Pt is stable, no on IV medications, and is pending TCU placement.     Today's Updates  - increase spironolactone to 100 mg daily  - stop IV dilaudid  - awaiting TCU placement  - declines addiction medicine/chemical dependency consult    Generalized abdominal pain  RUQ abdominal pain  Left hepatic lobe hypodense lesion  Patient has chronic generalized abdominal pain that has acutely worsened, in addition to sharp RUQ pain that is reproducible on exam. Patient has had multiple admissions over the past month due to abdominal pain. Hospitalized on 6/24/2021 at [facility] in [place] and placed on a 28-day course of prednisolone which patient did not complete. Then admitted to [facility] on 7/8/2021 for ongoing pain and had an ER visit on 7/22/2021 again for ongoing abdominal pain. Previous CT scan in July 2021 showed hepatocellular disease and steatosis with portal hypertension including splenomegaly and recanalization of the umbilical vein also mild to moderate ascites and moderate anasarca. Repeat CT scan here showed the same finding and a hypodense liver lesion involving left hepatic lobe. Her prior hepatitis serologies were negative. Pt was positive for hep C antibody but her PCR was undetectable. Patient has had several paracentesis which did not improve her pain. Abdominal pain could be 2/2 to cirrhosis.   -continue oxycodone 5 mg Q6H PRN  -stop IV dilaudid 0.5 mg Q3H  PRN for break through pain  -continue tylenol 650 mg Q8H  -continue PO gabapentin  -continue PO duloxetine  -outpatient work up for liver mass    Decompensated alcoholic cirrhosis c/b ascites  Hx of alcoholic hepatitis (s/p incomplete treatment)   Patient was recently diagnosed with acute alcoholic hepatitis. She was started on steroid that showed improvement however patient did not complete the steroid treatment. Liver labs WNL. No documented hx of esophageal varices. Recent EGD at the OSH showed gastritis. No prior HE however patient was on lactulose PTA. She has had large volume ascites and has undergone paracentesis several times. Her last paracentesis was on 08/02, at that time 4.4 L were removed. No prior hx of SBP, not on SBP prophylaxis PTA.    MELD-Na score: 13 at 8/17/2021  5:44 AM  MELD score: 12 at 8/17/2021  5:44 AM  Calculated from:  Serum Creatinine: 0.78 mg/dL (Using min of 1 mg/dL) at 8/17/2021  5:44 AM  Serum Sodium: 136 mmol/L at 8/17/2021  5:44 AM  Total Bilirubin: 1.0 mg/dL at 8/15/2021  6:08 AM  INR(ratio): 1.69 at 8/15/2021  6:08 AM  Age: 41 years    - paracentesis completed 8/16, no evidence of SBP  - GI consulted, appreciated recs   - No immediate plan for EGD   - anemia work up as above  - continue lasix as above  - increase spironolactone to 100 mg daily  - CMP and INR daily    Lactic acidosis  Likely 2/2 intravascular volume depletion in the setting of anasarca and decreased clearance due to liver disease. Less concerned about infection at this time  -okay to start abx if any HD instability   -no need to trend lactate    Hematemesis, resolved  Hx of melena, resolved  Patient reported small volume hematemesis. On presentation she was vitally stable then became a little more tachycardic. Repeat CBC showed low Hb at 7.7 however all 3 cell lines were dropped, could be dilutional vs acute bleeding. No further episodes after admission. She was started on IV octreotide and pantoprazole. As per  chart review she had a recent EGD that showed gastritis. GI consulted, awaiting recs.   -2 gm sodium diet  -continue PO pantoprazole bid  -GI consulted, appreciated recs    Chronic leukocytosis, improving  Could be due to alcoholic hepatitis. Vitally stable on admission. No fever or chills. No localizing s/s of infection. Infectious work up including BCX, CXR, UA. BCx negative for 3 days. Paracentesis fluid testing neg for SBP. UA negative for infection.   -monitor CBC    Bilateral lower extremity edema  Patient complained of bilateral lower extremity edema. PTA she was on spironolactone only which was stopped due to BROOKE. She also complained of bilateral lower extremity pain. No DVT on dopplers. No signs of cellulitis. Patient also has ascites and has been frequently getting paracentesis,  concerns for anasarca. No known kidney or heart problem or any enteropathy. She has alcoholic liver cirrhosis and albumin is low however other liver labs are normal. TSH wnl. No protein on UA however her urine protein/creatinien ratio is elevated. US abdomen also revealed normal kidneys.   - lymphedema consult placed  -continue PO lasix 40mg daily  -increase spironolactone to 100mg daily  -monitor BMP    Acute on chronic macrocytic anemia  Patient has borderline macrocytosis. Her baseline hb appears to be around 8-9. No ongoing bleeding but patient reported small volume hematemesis as above. She is hemodynamically stable. Concerned that another process might be driving the anemia. Folate is low. Vit B12 wnl. Retic count is appropriate.   - monitor Hb daily  - peripheral smear pending   - LDH, haptoglobin pending    Polysubstance use disorder  Pt was found to have opioids in her urine tox screen. Pt has a hx of alcohol use, however reports her last drink was 1 year ago. Patient reports smoking 1-2 cigarettes.   - patient declines addiction medicine/chemical dependency consults  - nicotine patch not indicated    Hx of Yeast  "cystitis  -s/p 14 day course of  fluconazole      Diet: 2 Gram Sodium Diet  Snacks/Supplements Adult: Other; Boost/Ensure; Between Meals    DVT Prophylaxis: INR elevated, ambulation   Anderson Catheter: Not present  Fluids: NA  Central Lines: None  Code Status: Full Code      Disposition Plan   Expected discharge: 2 days recommended to transitional care unit once adequate pain management/ tolerating PO medications.     The patient's care was discussed with the Attending Physician, Dr. Leger.    HARINI MARTIN  [facility] [address] [place] [address]  Securely message with the Vocera Web Console (learn more here)  Text page via [facility] Paging/Directory  Please see sign in/sign out for up to date coverage information    Resident/Fellow Attestation   I, Estephania Stern, was present with the medical/DIAZ student who participated in the service and in the documentation of the note.  I have verified the history and personally performed the physical exam and medical decision making.  I agree with the assessment and plan of care as documented in the note.        Estephania Stern MD  PGY1  Date of Service (when I saw the patient): 08/17/21    ______________________________________________________________________    Interval History   No acute events overnight. Maricruz appears in pain on exam, and states she is \"dizzy.\" She endorses 7/10 generalized abdominal pain in addition to sharp non radiating RUQ pain. The RUQ pain does not change with deep inspiration. She denies fever, chills, chest pain, shortness of breath, nausea, vomiting, diarrhea. She says her legs are swollen but it feels unchanged from yesterday.     4 point ROS is negative except as stated above.     Data reviewed today: I reviewed all medications, new labs and imaging results over the last 24 hours.     Physical Exam   Vital Signs: Temp: 97.6  F (36.4  C) Temp src: Oral BP: 103/42 Pulse: 89   Resp: 18 SpO2: 97 % O2 Device: None (Room air)  "   Weight: 246 lbs 14.64 oz     Physical Exam  Constitutional:       General: She is in acute distress.      Appearance: She is obese.   HENT:      Head: Normocephalic and atraumatic.      Mouth/Throat:      Dentition: Dental caries present.      Comments: Some broken front teeth     Eyes:      Extraocular Movements: Extraocular movements intact.      Conjunctiva/sclera: Conjunctivae normal.   Cardiovascular:      Rate and Rhythm: Normal rate and regular rhythm.      Heart sounds: Normal heart sounds, S1 normal and S2 normal. No murmur heard.     Pulmonary:      Effort: Pulmonary effort is normal.      Breath sounds: Normal breath sounds.   Abdominal:      General: Abdomen is protuberant. Bowel sounds are normal. There is distension.      Palpations: Abdomen is soft.      Tenderness: There is generalized abdominal tenderness and tenderness in the right upper quadrant.   Musculoskeletal:      Right lower le+ Pitting Edema present.      Left lower le+ Pitting Edema present.   Skin:     General: Skin is warm.   Neurological:      General: No focal deficit present.         Data   Recent Labs   Lab 21  1053 21  0544 21  0710 08/15/21  0608 21  0633 21  1807   WBC 13.7* 14.8* 11.4* 11.4* 13.9* 18.1*   HGB 8.1* 8.4* 7.8* 7.9*  7.9* 7.7* 9.3*   MCV 98 99 100 101* 100 99    193 217 259 248 307   INR  --   --   --  1.69* 1.97* 1.72*   NA  --  136 139 139 137 137   POTASSIUM  --  3.5 3.6 3.7 3.5 3.3*   CHLORIDE  --  105 108 110* 107 106   CO2  --  27 26 22 26 23   BUN  --  5* 4* 5* 7 8   CR  --  0.78 0.79 0.81 0.70 0.69   ANIONGAP  --  4 5 7 4 8   MELODY  --  8.0* 7.8* 7.9* 8.1* 7.6*   GLC  --  96 125* 133* 82 136*   ALBUMIN  --   --   --  2.3* 2.8* 1.7*   PROTTOTAL  --   --   --  6.6* 6.8 6.7*   BILITOTAL  --   --   --  1.0 1.4* 1.1   ALKPHOS  --   --   --  93 89 119   ALT  --   --   --   12 18   AST  --   --   --     LIPASE  --   --   --   --   --  <10*     Recent Results  (from the past 24 hour(s))   Echo Complete   Result Value    LVEF  60-65%    Narrative    [id number]  [id number]  [id number]  [id number]^JERI^SALVATORE     [facility]  Echocardiography Laboratory  [address] [place] [id number]     Name: GEETA VALLECILLO  MRN: [id number]  : 1980  Study Date: 2021 01:54 PM  Age: 41 yrs  Gender: Female  Patient Location: [facility]  Reason For Study: Other, Please Specify in Comments  Ordering Physician: SALVATORE WILCOX  Performed By: MICHAEL Abel     BSA: 2.2 m2  Height: 63 in  Weight: 264 lb  HR: 100  BP: 100/41 mmHg  ______________________________________________________________________________  Procedure  Complete Portable Echo Adult.  ______________________________________________________________________________  Interpretation Summary  Left ventricular size, wall motion and function are normal. The ejection  fraction is 60-65%.  Right ventricular function, chamber size, wall motion, and thickness are  normal.  Dilation of the inferior vena cava is present with abnormal respiratory  variation in diameter. No pericardial effusion is present.     There is no prior study for direct comparison.  ______________________________________________________________________________  Left Ventricle  Left ventricular size, wall motion and function are normal. The ejection  fraction is 60-65%. Diastolic function not assessed due to tachycardia.     Right Ventricle  Right ventricular function, chamber size, wall motion, and thickness are  normal.     Atria  Both atria appear normal.     Mitral Valve  The mitral valve is normal. Trace mitral insufficiency is present.     Aortic Valve  Aortic valve is normal in structure and function. The aortic valve is  tricuspid. Trace aortic insufficiency is present.     Tricuspid Valve  The tricuspid valve is normal. Trace to mild tricuspid insufficiency is  present. The right ventricular systolic pressure is approximated at  43.5 mmHg  plus the right atrial pressure.     Pulmonic Valve  The pulmonic valve is normal.     Vessels  The aorta root is normal. IVC diameter >2.1 cm collapsing <50% with sniff  suggests a high RA pressure estimated at 15 mmHg or greater. Dilation of the  inferior vena cava is present with abnormal respiratory variation in diameter.     Pericardium  No pericardial effusion is present.     Compared to Previous Study  There is no prior study for direct comparison.  ______________________________________________________________________________  MMode/2D Measurements & Calculations     RVDd: 4.5 cm  IVSd: 1.0 cm  LVIDd: 4.9 cm  LVIDs: 3.5 cm  LVPWd: 1.1 cm  FS: 29.2 %  LV mass(C)d: 190.9 grams  LV mass(C)dI: 87.8 grams/m2  asc Aorta Diam: 2.5 cm  LVOT diam: 2.0 cm  LVOT area: 3.2 cm2  LA Volume Index (BP): 24.7 ml/m2  RWT: 0.45  TAPSE: 2.9 cm     Doppler Measurements & Calculations  MV E max jhon: 133.2 cm/sec  MV A max jhon: 141.0 cm/sec  MV E/A: 0.94  MV dec slope: 563.8 cm/sec2  MV dec time: 0.24 sec  PA acc time: 0.14 sec  TR max jhon: 329.9 cm/sec  TR max P.5 mmHg  E/E' av.6  Lateral E/e': 13.6  Medial E/e': 15.5     ______________________________________________________________________________  Report approved by: Braulio PINEDA 2021 02:51 PM

## 2021-08-17 NOTE — PROGRESS NOTES
"[id number]-[id number] progress note. Care from: 07:00 - 15:00     /42 (BP Location: Left arm)   Pulse 89   Temp 97.6  F (36.4  C) (Oral)   Resp 18   Ht 1.6 m (5' 3\")   Wt 112 kg (246 lb 14.6 oz)   SpO2 97%   BMI 43.74 kg/m       /56 (BP Location: Left arm)   Pulse 91   Temp 97.9  F (36.6  C) (Oral)   Resp 18   Ht 1.6 m (5' 3\")   Wt 112 kg (246 lb 14.6 oz)   SpO2 98%   BMI 43.74 kg/m          Vitals: VSS     Neuro: WDL   o Pain: Continued abdominal pain, somewhat controlled on current regimen (PRN oxycodone, scheduled cymbalta and voltaren gel). Pt refusing tylenol   o Mood/Behavior: Calm and cooperative     Activity: Up with standby assist to and from commode     Cardiovascular: WDL     Respiratory: WDL     GI & Nutrition: Good appetite on low sodium diet. Abdomen distended. Paracentesis site intact, covered with clean dressing.   o Nausea: Denies   o Bowel Movement: Soft bowel movements.     : WDL ex icteric urine     Skin, Wounds & LDAs: Skin largely intact ex minor bruising and old paracentesis site. PIV intact, saline locked.     Notable Labs: WBC increased from yesterday     Events & Plan Updates: Per team continuing to make small adjustments to medications, watching leukocytosis, weaning off of pain medication. Continue to monitor for signs of bleeding.   "

## 2021-08-18 ENCOUNTER — APPOINTMENT (OUTPATIENT)
Dept: CT IMAGING | Facility: CLINIC | Age: 41
End: 2021-08-18
Attending: INTERNAL MEDICINE
Payer: MEDICAID

## 2021-08-18 ENCOUNTER — APPOINTMENT (OUTPATIENT)
Dept: GENERAL RADIOLOGY | Facility: CLINIC | Age: 41
End: 2021-08-18
Attending: INTERNAL MEDICINE
Payer: MEDICAID

## 2021-08-18 ENCOUNTER — APPOINTMENT (OUTPATIENT)
Dept: OCCUPATIONAL THERAPY | Facility: CLINIC | Age: 41
End: 2021-08-18
Payer: MEDICAID

## 2021-08-18 ENCOUNTER — APPOINTMENT (OUTPATIENT)
Dept: PHYSICAL THERAPY | Facility: CLINIC | Age: 41
End: 2021-08-18
Payer: MEDICAID

## 2021-08-18 LAB
ALBUMIN SERPL-MCNC: 2.1 G/DL (ref 3.4–5)
ALBUMIN UR-MCNC: NEGATIVE MG/DL
ALP SERPL-CCNC: 73 U/L (ref 40–150)
ALT SERPL W P-5'-P-CCNC: 11 U/L (ref 0–50)
ANION GAP SERPL CALCULATED.3IONS-SCNC: 4 MMOL/L (ref 3–14)
APPEARANCE FLD: CLEAR
APPEARANCE UR: CLEAR
AST SERPL W P-5'-P-CCNC: 24 U/L (ref 0–45)
BASOPHILS NFR FLD MANUAL: 2 %
BILIRUB SERPL-MCNC: 0.7 MG/DL (ref 0.2–1.3)
BILIRUB UR QL STRIP: NEGATIVE
BUN SERPL-MCNC: 6 MG/DL (ref 7–30)
CALCIUM SERPL-MCNC: 7.8 MG/DL (ref 8.5–10.1)
CHLORIDE BLD-SCNC: 104 MMOL/L (ref 94–109)
CO2 SERPL-SCNC: 29 MMOL/L (ref 20–32)
COLOR FLD: YELLOW
COLOR UR AUTO: NORMAL
CREAT SERPL-MCNC: 0.71 MG/DL (ref 0.52–1.04)
ERYTHROCYTE [DISTWIDTH] IN BLOOD BY AUTOMATED COUNT: 15.7 % (ref 10–15)
GFR SERPL CREATININE-BSD FRML MDRD: >90 ML/MIN/1.73M2
GLUCOSE BLD-MCNC: 91 MG/DL (ref 70–99)
GLUCOSE UR STRIP-MCNC: NEGATIVE MG/DL
GRAM STAIN RESULT: NORMAL
HCT VFR BLD AUTO: 25.8 % (ref 35–47)
HGB BLD-MCNC: 8.2 G/DL (ref 11.7–15.7)
HGB UR QL STRIP: NEGATIVE
INR PPP: 1.72 (ref 0.85–1.15)
KETONES UR STRIP-MCNC: NEGATIVE MG/DL
LEUKOCYTE ESTERASE UR QL STRIP: NEGATIVE
LYMPHOCYTES NFR FLD MANUAL: 43 %
MCH RBC QN AUTO: 30.9 PG (ref 26.5–33)
MCHC RBC AUTO-ENTMCNC: 31.8 G/DL (ref 31.5–36.5)
MCV RBC AUTO: 97 FL (ref 78–100)
MONOS+MACROS NFR FLD MANUAL: NORMAL %
NEUTS BAND NFR FLD MANUAL: 10 %
NITRATE UR QL: NEGATIVE
OTHER CELLS FLD MANUAL: 45 %
PH UR STRIP: 7 [PH] (ref 5–7)
PLATELET # BLD AUTO: 197 10E3/UL (ref 150–450)
POTASSIUM BLD-SCNC: 3.6 MMOL/L (ref 3.4–5.3)
PROT SERPL-MCNC: 6.1 G/DL (ref 6.8–8.8)
RBC # BLD AUTO: 2.65 10E6/UL (ref 3.8–5.2)
SODIUM SERPL-SCNC: 137 MMOL/L (ref 133–144)
SP GR UR STRIP: 1.01 (ref 1–1.03)
UROBILINOGEN UR STRIP-MCNC: NORMAL MG/DL
WBC # BLD AUTO: 14.5 10E3/UL (ref 4–11)
WBC # FLD AUTO: 181 /UL

## 2021-08-18 PROCEDURE — 99233 SBSQ HOSP IP/OBS HIGH 50: CPT | Mod: 25 | Performed by: INTERNAL MEDICINE

## 2021-08-18 PROCEDURE — 89050 BODY FLUID CELL COUNT: CPT | Performed by: INTERNAL MEDICINE

## 2021-08-18 PROCEDURE — 71046 X-RAY EXAM CHEST 2 VIEWS: CPT

## 2021-08-18 PROCEDURE — 49083 ABD PARACENTESIS W/IMAGING: CPT | Performed by: INTERNAL MEDICINE

## 2021-08-18 PROCEDURE — 36415 COLL VENOUS BLD VENIPUNCTURE: CPT | Performed by: STUDENT IN AN ORGANIZED HEALTH CARE EDUCATION/TRAINING PROGRAM

## 2021-08-18 PROCEDURE — 999N000128 HC STATISTIC PERIPHERAL IV START W/O US GUIDANCE

## 2021-08-18 PROCEDURE — 87205 SMEAR GRAM STAIN: CPT | Performed by: INTERNAL MEDICINE

## 2021-08-18 PROCEDURE — P9047 ALBUMIN (HUMAN), 25%, 50ML: HCPCS | Performed by: INTERNAL MEDICINE

## 2021-08-18 PROCEDURE — 120N000002 HC R&B MED SURG/OB UMMC

## 2021-08-18 PROCEDURE — 250N000011 HC RX IP 250 OP 636: Performed by: INTERNAL MEDICINE

## 2021-08-18 PROCEDURE — 250N000013 HC RX MED GY IP 250 OP 250 PS 637: Performed by: INTERNAL MEDICINE

## 2021-08-18 PROCEDURE — 97110 THERAPEUTIC EXERCISES: CPT | Mod: GP | Performed by: REHABILITATION PRACTITIONER

## 2021-08-18 PROCEDURE — 97140 MANUAL THERAPY 1/> REGIONS: CPT | Mod: GO | Performed by: OCCUPATIONAL THERAPIST

## 2021-08-18 PROCEDURE — 97530 THERAPEUTIC ACTIVITIES: CPT | Mod: GP | Performed by: REHABILITATION PRACTITIONER

## 2021-08-18 PROCEDURE — 89051 BODY FLUID CELL COUNT: CPT | Performed by: INTERNAL MEDICINE

## 2021-08-18 PROCEDURE — 0W9G3ZZ DRAINAGE OF PERITONEAL CAVITY, PERCUTANEOUS APPROACH: ICD-10-PCS | Performed by: INTERNAL MEDICINE

## 2021-08-18 PROCEDURE — 74177 CT ABD & PELVIS W/CONTRAST: CPT | Mod: 26 | Performed by: STUDENT IN AN ORGANIZED HEALTH CARE EDUCATION/TRAINING PROGRAM

## 2021-08-18 PROCEDURE — 87070 CULTURE OTHR SPECIMN AEROBIC: CPT | Performed by: INTERNAL MEDICINE

## 2021-08-18 PROCEDURE — 71046 X-RAY EXAM CHEST 2 VIEWS: CPT | Mod: 26 | Performed by: RADIOLOGY

## 2021-08-18 PROCEDURE — 80053 COMPREHEN METABOLIC PANEL: CPT | Performed by: INTERNAL MEDICINE

## 2021-08-18 PROCEDURE — 85027 COMPLETE CBC AUTOMATED: CPT | Performed by: STUDENT IN AN ORGANIZED HEALTH CARE EDUCATION/TRAINING PROGRAM

## 2021-08-18 PROCEDURE — 36415 COLL VENOUS BLD VENIPUNCTURE: CPT | Performed by: INTERNAL MEDICINE

## 2021-08-18 PROCEDURE — 87040 BLOOD CULTURE FOR BACTERIA: CPT | Performed by: INTERNAL MEDICINE

## 2021-08-18 PROCEDURE — 74177 CT ABD & PELVIS W/CONTRAST: CPT

## 2021-08-18 PROCEDURE — 85610 PROTHROMBIN TIME: CPT | Performed by: INTERNAL MEDICINE

## 2021-08-18 PROCEDURE — 250N000013 HC RX MED GY IP 250 OP 250 PS 637: Performed by: STUDENT IN AN ORGANIZED HEALTH CARE EDUCATION/TRAINING PROGRAM

## 2021-08-18 PROCEDURE — 99233 SBSQ HOSP IP/OBS HIGH 50: CPT | Mod: GC | Performed by: INTERNAL MEDICINE

## 2021-08-18 PROCEDURE — 81003 URINALYSIS AUTO W/O SCOPE: CPT | Performed by: INTERNAL MEDICINE

## 2021-08-18 RX ORDER — METHOCARBAMOL 500 MG/1
500 TABLET, FILM COATED ORAL EVERY 6 HOURS PRN
Status: DISCONTINUED | OUTPATIENT
Start: 2021-08-18 | End: 2021-08-19

## 2021-08-18 RX ORDER — CEFTRIAXONE 1 G/1
1 INJECTION, POWDER, FOR SOLUTION INTRAMUSCULAR; INTRAVENOUS EVERY 24 HOURS
Status: DISCONTINUED | OUTPATIENT
Start: 2021-08-18 | End: 2021-08-21

## 2021-08-18 RX ORDER — LACTULOSE 10 G/10G
20 SOLUTION ORAL DAILY
Status: DISCONTINUED | OUTPATIENT
Start: 2021-08-18 | End: 2021-08-23 | Stop reason: HOSPADM

## 2021-08-18 RX ORDER — IOPAMIDOL 755 MG/ML
135 INJECTION, SOLUTION INTRAVASCULAR ONCE
Status: COMPLETED | OUTPATIENT
Start: 2021-08-18 | End: 2021-08-18

## 2021-08-18 RX ORDER — ALBUMIN (HUMAN) 12.5 G/50ML
50 SOLUTION INTRAVENOUS ONCE
Status: COMPLETED | OUTPATIENT
Start: 2021-08-18 | End: 2021-08-18

## 2021-08-18 RX ADMIN — IOPAMIDOL 135 ML: 755 INJECTION, SOLUTION INTRAVENOUS at 11:56

## 2021-08-18 RX ADMIN — CEFTRIAXONE 1 G: 1 INJECTION, POWDER, FOR SOLUTION INTRAMUSCULAR; INTRAVENOUS at 09:19

## 2021-08-18 RX ADMIN — PANTOPRAZOLE SODIUM 40 MG: 40 TABLET, DELAYED RELEASE ORAL at 16:15

## 2021-08-18 RX ADMIN — DULOXETINE 20 MG: 20 CAPSULE, DELAYED RELEASE ORAL at 07:43

## 2021-08-18 RX ADMIN — POLYETHYLENE GLYCOL 3350 17 G: 17 POWDER, FOR SOLUTION ORAL at 07:43

## 2021-08-18 RX ADMIN — FOLIC ACID 1 MG: 1 TABLET ORAL at 07:43

## 2021-08-18 RX ADMIN — DICLOFENAC SODIUM 2 G: 10 GEL TOPICAL at 16:15

## 2021-08-18 RX ADMIN — GABAPENTIN 100 MG: 100 CAPSULE ORAL at 07:43

## 2021-08-18 RX ADMIN — OXYCODONE 5 MG: 5 TABLET ORAL at 02:43

## 2021-08-18 RX ADMIN — OXYCODONE 5 MG: 5 TABLET ORAL at 09:28

## 2021-08-18 RX ADMIN — PANTOPRAZOLE SODIUM 40 MG: 40 TABLET, DELAYED RELEASE ORAL at 07:43

## 2021-08-18 RX ADMIN — OXYCODONE 5 MG: 5 TABLET ORAL at 16:15

## 2021-08-18 RX ADMIN — FUROSEMIDE 40 MG: 40 TABLET ORAL at 07:43

## 2021-08-18 RX ADMIN — DICLOFENAC SODIUM 2 G: 10 GEL TOPICAL at 07:43

## 2021-08-18 RX ADMIN — ALBUMIN HUMAN 50 G: 0.25 SOLUTION INTRAVENOUS at 09:19

## 2021-08-18 RX ADMIN — DICLOFENAC SODIUM 2 G: 10 GEL TOPICAL at 19:04

## 2021-08-18 RX ADMIN — GABAPENTIN 100 MG: 100 CAPSULE ORAL at 13:48

## 2021-08-18 RX ADMIN — METHOCARBAMOL 500 MG: 500 TABLET ORAL at 11:44

## 2021-08-18 RX ADMIN — THIAMINE HCL TAB 100 MG 100 MG: 100 TAB at 07:43

## 2021-08-18 RX ADMIN — METHOCARBAMOL 500 MG: 500 TABLET ORAL at 22:25

## 2021-08-18 RX ADMIN — LACTULOSE 20 G: 10 POWDER, FOR SOLUTION ORAL at 16:20

## 2021-08-18 RX ADMIN — OXYCODONE 5 MG: 5 TABLET ORAL at 22:25

## 2021-08-18 RX ADMIN — SPIRONOLACTONE 100 MG: 100 TABLET ORAL at 07:43

## 2021-08-18 RX ADMIN — GABAPENTIN 100 MG: 100 CAPSULE ORAL at 19:04

## 2021-08-18 ASSESSMENT — ACTIVITIES OF DAILY LIVING (ADL)
ADLS_ACUITY_SCORE: 18

## 2021-08-18 ASSESSMENT — MIFFLIN-ST. JEOR: SCORE: 1757.33

## 2021-08-18 NOTE — PROGRESS NOTES
"[facility]    Hepatology Follow-up    CC: Hematemesis    Dx: Decompensated alcoholic cirrhosis                  Macrocytic anemia    24 hour events:   JASPREET  Subjective:  C/o abd pain  Stated that she has been working with PT    Medications  Current Facility-Administered Medications   Medication Dose Route Frequency    acetaminophen  650 mg Oral Q8H    diclofenac  2 g Topical 4x Daily    DULoxetine  20 mg Oral Daily    folic acid  1 mg Oral Daily    furosemide  40 mg Oral Daily    gabapentin  100 mg Oral TID    pantoprazole  40 mg Oral BID AC    polyethylene glycol  17 g Oral BID    sodium chloride (PF)  3 mL Intracatheter Q8H    spironolactone  100 mg Oral Daily    vitamin B1  100 mg Oral Daily       Review of systems  A 10-point review of systems was negative.    Examination  /60 (BP Location: Left arm)   Pulse 91   Temp 98.4  F (36.9  C) (Oral)   Resp 18   Ht 1.6 m (5' 3\")   Wt 111.4 kg (245 lb 8 oz)   SpO2 97%   BMI 43.49 kg/m      Intake/Output Summary (Last 24 hours) at 8/16/2021 1104  Last data filed at 8/16/2021 0845  Gross per 24 hour   Intake 630 ml   Output 400 ml   Net 230 ml       Constitutional: cooperative, pleasant  Eyes: Sclera anicteric  Ears/nose/mouth/throat: poor dentition  Neck: supple  CV: 1+ edema  Respiratory: Unlabored breathing  Abd: Mild distended, +bs, LUQ/RUQ tender  Skin: warm, perfused, no jaundice  Neuro: AAO x 3, No asterixis      MELD-Na score: 12 at 8/18/2021  5:38 AM  MELD score: 12 at 8/18/2021  5:38 AM  Calculated from:  Serum Creatinine: 0.71 mg/dL (Using min of 1 mg/dL) at 8/18/2021  5:38 AM  Serum Sodium: 137 mmol/L at 8/18/2021  5:38 AM  Total Bilirubin: 0.7 mg/dL (Using min of 1 mg/dL) at 8/18/2021  5:38 AM  INR(ratio): 1.72 at 8/18/2021  5:38 AM  Age: 41 years    Radiology  US RUQ  8/14/21:  Impression:   1.  Liver demonstrates coarsened parenchymal echotexture which can be  seen in setting of chronic parenchymal disease.  2.  " Moderate volume ascites.  3.  Patent right upper quadrant vasculature on Doppler evaluation.  4.  Gallbladder is not seen.  5.  Splenomegaly.      Assessment  41 year old with history of decompensated alcoholic cirrhosis, complicated by ascites, nicotine dependence, chronic abdominal pain who was admitted with concern for episode of hematemesis.    Macrocytic anemia  Patient came to the hospital for abdominal pain, she reported vomiting with some blood 2 days before admission.  Her hemoglobin is stable with no source of bleeding. 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 She had recent EGD with gastritis, would not repeat unless overt bleeding.    Hx of alcoholic hepatitis/cirrhosis  Pt had alcoholic hepatitis, was on steroids but didot finish the course. LFTs are not elevated and not suggestive of alc hepatitis anymore. Her US shows patent vasculature with antegrade flow, mild splenomegaly, less likley to have albumin slightly low but platelet count within normal limit, given the overall picture less likely to have cirrhosis.     Chronic abdominal pain  Patient has CT scan showing hypodense area in the liver which was not seen upon the ultrasound.  Her CT scan did not suggest any source of abdominal pain. 5L upon para yesterday, fluid analysis -ve for SBP.  Unclear etiology of abd pain.    Recommendations  -- Triple phase CT/liver MRI inpatient  -- Repeat infectious workup given worsening leukocytosis  -- Repeat diag/therapeutic paracentesis today  -- PEth test pending  -- Continue PPI p.o. twice daily  -- Continue antibiotic course for 5 days  -- Continue Gabapentin, would help with maintaining abstinence   -- Pain management as per primary team  -- Lactulose PO  -- Monitor transaminases, bilirubin, INR  -- Ensure sodium restriction to 2000 mg per day  -- Continue Lasix at 40mg and spironolactone at 50mg  -- Adequate  protein diet (1.2 - 1.5g/kg/day)   - Recommend multiple meals during the day, Snacks and shakes during the day/night   - Can use Ensure/Boost drinks TID  -- Chem dep consultation  -- PT/OT and ambulate TID    Thank you for involving us in this patient's care. Please do not hesitate to contact the GI service with any questions or concerns.      Pt care plan discussed with Dr. Arana, Hepatology staff physician.    This note was created with voice recognition software, and while reviewed for accuracy, typos may remain.    Beatriz Yanez MD  Advance and Transplant Hepatology Fellow  Pager: [phone]

## 2021-08-18 NOTE — PROGRESS NOTES
[facility]    Progress Note - [facility] 1 Service        Date of Admission:  8/13/2021    Assessment & Plan      Maricruz Lechuga is a 41 year old female with a PMHX significant for acute alcoholic hepatitis s/p steroids (pt did not complete the treatment), decompensated alcoholic cirrhosis with ascites and coagulopathy, morbid obesity, tobacco dependence, presented with abdominal pain and BLE swelling, hematemesis and melena, both resolved, and was found to have low hemoglobin. Pt is stable, no on IV medications, and is pending TCU placement.     Today's Updates  - start ceftriaxone x 5 days  - albumin 25% 50g today  - start robaxin for pain, avoid dilaudid  - start lactulose once daily  - CT AP unchanged today  - repeat paracentesis today  - repeat infectious workup today (CXR, UA, BCx)  - awaiting TCU placement  - declines addiction medicine/chemical dependency consult    Abdominal pain  Left hepatic lobe hypodense lesion  Patient has chronic generalized abdominal pain that has acutely worsened, in addition to sharp RUQ pain that is reproducible on exam. Patient has had multiple admissions over the past month due to abdominal pain. Hospitalized on 6/24/2021 at [facility] in [place] and placed on a 28-day course of prednisolone which patient did not complete. Then admitted to [facility] on 7/8/2021 for ongoing pain and had an ER visit on 7/22/2021 again for ongoing abdominal pain. Previous CT scan in July 2021 showed hepatocellular disease and steatosis with portal hypertension including splenomegaly and recanalization of the umbilical vein also mild to moderate ascites and moderate anasarca. Repeat CT scan here showed the same finding and a hypodense liver lesion involving left hepatic lobe, though second CT on 8/18 here did not demonstrate that lesion well. Her prior hepatitis serologies were negative. Pt was positive for hep C antibody but  her PCR was undetectable. Patient has had several paracenteses which did not improve her pain. Abdominal pain could be 2/2 to cirrhosis.   - start methocarbamol 500mg q6H PRN  - continue oxycodone 5 mg Q6H PRN  - continue tylenol 650 mg Q8H  - continue PO gabapentin  - continue PO duloxetine  - outpatient work up for liver lesion    Decompensated alcoholic cirrhosis c/b ascites  Hx of alcoholic hepatitis (s/p incomplete treatment)   Patient was recently diagnosed with acute alcoholic hepatitis. She was started on steroid that showed improvement however patient did not complete the steroid treatment. Liver labs WNL. No documented hx of esophageal varices. Recent EGD at the OSH showed gastritis. No prior HE however patient was on lactulose PTA. She has had large volume ascites and has undergone paracentesis several times. Her last paracentesis was on 08/02, at that time 4.4 L were removed. No prior hx of SBP, not on SBP prophylaxis PTA.    MELD-Na score: 12 at 8/18/2021  5:38 AM  MELD score: 12 at 8/18/2021  5:38 AM  Calculated from:  Serum Creatinine: 0.71 mg/dL (Using min of 1 mg/dL) at 8/18/2021  5:38 AM  Serum Sodium: 137 mmol/L at 8/18/2021  5:38 AM  Total Bilirubin: 0.7 mg/dL (Using min of 1 mg/dL) at 8/18/2021  5:38 AM  INR(ratio): 1.72 at 8/18/2021  5:38 AM  Age: 41 years    - paracentesis completed 8/16, no evidence of SBP, repeated 8/18  - GI consulted, appreciated recs   - No immediate plan for EGD   - ceftriaxone x 5 days (8/18-8/22)   - albumin 25% 50g today  - continue lasix 40 mg daily  - increase spironolactone to 100 mg daily  - lactulose once daily   - CMP and INR daily    Chronic leukocytosis  Could be due to alcoholic hepatitis. Vitally stable on admission. No fever or chills. No localizing s/s of infection aside from abdominal pain. Initial infectious work up including BCX, CXR, UA negative. BCx negative for 3 days. Paracentesis fluid testing 8/13 neg for SBP.     Slight increase in WBC overnight  to 14, though has been in 11-14 range throughout hospitalization. Repeat infectious workup 8/18: UA negative, CXR with trace mixed interstitial and streaky opacities likely atelectasis vs atypical infection, CT AP with contrast showing stable chronic liver disease/portal hypertension, moderate ascites and diffuse anasarca but no other changes, repeat paracentesis pending, BC pending.     - start ceftriaxone for SBP as above, ceftriaxone 1g x 5 days (8/18-8/22)  - repeat paracentesis and BC pending  - monitor CBC    Lactic acidosis  Likely 2/2 intravascular volume depletion in the setting of anasarca and decreased clearance due to liver disease.   - no need to trend lactate  - infectious workup above    Hematemesis, resolved  Hx of melena, resolved  Patient reported small volume hematemesis. On presentation she was vitally stable then became a little more tachycardic. Repeat CBC showed low Hgb at 7.7 however all 3 cell lines were dropped, could be dilutional vs acute bleeding. No further episodes after admission. She was started on IV octreotide and pantoprazole. As per chart review she had a recent EGD that showed gastritis.   - GI consulted, appreciate recs  - 2g sodium diet  - continue PO pantoprazole bid    Bilateral lower extremity edema  Patient complained of bilateral lower extremity edema. PTA she was on spironolactone only which was stopped due to BROOKE. She also complained of bilateral lower extremity pain. No DVT on dopplers. No signs of cellulitis. Patient also has ascites and has been frequently getting paracentesis, concerns for anasarca. No known kidney or heart problem or any enteropathy. She has alcoholic liver cirrhosis and albumin is low however other liver labs are normal. TSH wnl. No protein on UA however her urine protein/creatinine ratio was elevated on admission. US abdomen also revealed normal kidneys.   - lymphedema consult, appreciate cares  - continue PO lasix 40mg daily  - continue  [id number] daily  - monitor BMP    Acute on chronic macrocytic anemia  Patient has borderline macrocytosis. Her baseline hgb appears to be around 8-9. No ongoing bleeding but patient reported small volume hematemesis as above. She is hemodynamically stable. Concerned that another process might be driving the anemia, possibly related to alcohol use though patient denies alcohol use in past year. Folate is low. Vit B12 wnl. Retic count is appropriate. Peripheral smear 8/14 with moderate normochromic/normocytic anemia without hemolysis, slight neutrophilic leukocytosis. LDH normal.   - CBC daily    Polysubstance use disorder  Pt was found to have opioids in her urine tox screen. Pt has a hx of alcohol use, however reports her last drink was 1 year ago. Patient reports smoking 1-2 cigarettes.   - patient declines addiction medicine/chemical dependency consults  - nicotine patch not indicated    Hx of Yeast cystitis  - s/p 14 day course of  fluconazole      Diet: 2 Gram Sodium Diet  Snacks/Supplements Adult: Other; Ensure plant base chocolate at 2:00 and at HS daily; Between Meals    DVT Prophylaxis: INR elevated, ambulation   Anderson Catheter: Not present  Fluids: None  Central Lines: None  Code Status: Full Code      Disposition Plan   Expected discharge: 1-2 days recommended to transitional care unit once adequate pain management/ tolerating PO medications.     The patient's care was discussed with the Attending Physician, Dr. Leger.    Estephania Stern MD  [address]  Securely message with the Vocera Web Console (learn more here)  Text page via [employer] Paging/Directory  Please see sign in/sign out for up to date coverage information    ______________________________________________________________________    Interval History   NAEO. Used 5mg oxycodone x4 doses yesterday, longest interval 8hrs between doses. Had more abdominal pain after discontinuing  the IV dilaudid yesterday and was given 0.2mg of IV dilaudid last night. Slept comfortably, but asking for more IV dilaudid this morning as pain was not well controlled. She was also having more fluid leakage from prior paracentesis site per nursing. Denies nausea or vomiting, did not have a stool yesterday but had multiple the day prior. No chest pain or shortness of breath, she does not know if her leg swelling has changed with the wraps. No other new concerns or complaints.    Data reviewed today: I reviewed all medications, new labs and imaging results over the last 24 hours.     Physical Exam   Vital Signs: Temp: 98.4  F (36.9  C) Temp src: Oral BP: 111/60 Pulse: 91   Resp: 18 SpO2: 97 % O2 Device: None (Room air)    Weight: 245 lbs 8 oz     Gen: sleeping but awakens to voice, appears comfortable at rest but grimaces and holds abdomen with movement and exam  HEENT: head atraumatic and normocephalic, hearing grossly normal, pupils equal and round, EOMI, no conjunctival injection or scleral icterus, no nasal drainage, missing multiple upper teeth with very poor dentition  Neck:  normal ROM  CV: RRR, normal S1 and S2, no murmurs, rubs, or gallops  Pulm: CTAB, no wheezes, rhonchi, or rales. No increased WOB.  Abd: distended, soft, significantly tender to palpation diffusely, worst over RUQ and around previous paracentesis site, normal bowel sounds throughout.  Ext: pitting edema above the knee with wraps in place to knees bilaterally, normal muscle tone, moves all extremities equally  Skin: warm and dry, no rashes, pallor, cyanosis, jaundice, or bruising.  Neuro: A&Ox3, answers questions appropriately, normal speech, CN II-XII grossly intact.  Psych: normal affect, makes good eye contact        Data   Recent Labs   Lab 08/18/21  0538 08/17/21  1053 08/17/21  0544 08/16/21  0710 08/15/21  0608 08/14/21  0633 08/13/21  1807   WBC 14.5* 13.7* 14.8* 11.4* 11.4* 13.9* 18.1*   HGB 8.2* 8.1* 8.4* 7.8* 7.9*  7.9* 7.7*  9.3*   MCV 97 98 99 100 101* 100 99    190 193 217 259 248 307   INR 1.72*  --   --   --  1.69* 1.97* 1.72*     --  136 139 139 137 137   POTASSIUM 3.6  --  3.5 3.6 3.7 3.5 3.3*   CHLORIDE 104  --  105 108 110* 107 106   CO2 29  --  27 26 22 26 23   BUN 6*  --  5* 4* 5* 7 8   CR 0.71  --  0.78 0.79 0.81 0.70 0.69   ANIONGAP 4  --  4 5 7 4 8   MELODY 7.8*  --  8.0* 7.8* 7.9* 8.1* 7.6*   GLC 91  --  96 125* 133* 82 136*   ALBUMIN 2.1*  --   --   --  2.3* 2.8* 1.7*   PROTTOTAL 6.1*  --   --   --  6.6* 6.8 6.7*   BILITOTAL 0.7  --   --   --  1.0 1.4* 1.1   ALKPHOS 73  --   --   --  93 89 119   ALT 11  --   --   --  12 12 18   AST 24  --   --   --  19 22 21   LIPASE  --   --   --   --   --   --  <10*     No results found for this or any previous visit (from the past 24 hour(s)).

## 2021-08-18 NOTE — PLAN OF CARE
Night Shift Note  Complained of pain and was given oxycodone. Medication appeared to be helpful as patient fell asleep and appeared to be comfortable. Up to bedside commode independently. Continue with current plan of nursing care, and update MD with concerns as needed.

## 2021-08-18 NOTE — CONSULTS
Consult and Procedure Service - Procedure Note    Attending: nuvia  Resident: n/a  Procedure: Diagnostic and therapeutic paracentesis  Indication: ascites  Risk Assessment: low  Pre-procedure diagnosis: ascites, alcoholic hepatitis  Post-procedure diagnosis: same    The risks and benefits of the procedure were explained to pt who expressed understanding and opted to proceed.  Consent was obtained and placed in the chart.  A time out was performed.  An area of ascites was located and marked using ultrasound guidance in the R lower quadrant; the area was prepped and draped in the usual sterile fashion.  10 ml of 1% lidocaine was instilled and ascites located.  The pigtail paracentesis catheter and needle were inserted under real-time guidance until ascites obtained then the needle removed and the catheter advanced.  The apparatus was connected to vacuum bottles and a total of 3000 ml of straw colored fluid removed.  Ultrasound showed no remaining ascites. A specimen was sent for analysis. The catheter was withdrawn and the area dressed.  Patient tolerated the procedure well with no immediate complications.  Please contact the Consult and Procedure Service if any complications or concerns arise.     DAINA STRANGE MD, [facility]  Internal Medicine Hospitalist & Staff Physician  [facility]  Pager: [phone]  [email or web address]    DOS:  August 18, 2021

## 2021-08-18 NOTE — PROGRESS NOTES
"[id number] progress note. Care from: 07:00 - 15:00      BP 95/40 (BP Location: Right arm)   Pulse 95   Temp 98.2  F (36.8  C) (Oral)   Resp 16   Ht 1.6 m (5' 3\")   Wt 111.8 kg (246 lb 8.3 oz)   SpO2 95%   BMI 43.67 kg/m           Vitals: VSS ex hypotension     Neuro: WDL   ? Pain: Continued abdominal pain, somewhat controlled on current regimen (PRN oxycodone, robaxin, scheduled cymbalta and voltaren gel). Not much improvement. Pt refusing tylenol   ? Mood/Behavior: Calm and cooperative     Activity: Up with standby assist to and from commode     Cardiovascular: WDL ex hypotension. Albumin 50 g given today    Respiratory: WDL     GI & Nutrition: Good appetite on low sodium diet. Abdomen distended. Paracentesis site with small amount of leakage, dressing CDI. Abd CT today, concern for infection. Diagnostic para planned for today  ? Nausea: Denies   ? Bowel Movement: Soft bowel movements.     : WDL. UA/UC sent to lab     Skin, Wounds & LDAs: Skin largely intact ex minor bruising and old paracentesis site. PIV intact, saline locked.      Events & Plan Updates: Infectious workup, paracentesis today, pain control           "

## 2021-08-19 ENCOUNTER — APPOINTMENT (OUTPATIENT)
Dept: PHYSICAL THERAPY | Facility: CLINIC | Age: 41
End: 2021-08-19
Payer: MEDICAID

## 2021-08-19 LAB
ALBUMIN SERPL-MCNC: 2.4 G/DL (ref 3.4–5)
ALP SERPL-CCNC: 64 U/L (ref 40–150)
ALT SERPL W P-5'-P-CCNC: 13 U/L (ref 0–50)
ANION GAP SERPL CALCULATED.3IONS-SCNC: 6 MMOL/L (ref 3–14)
AST SERPL W P-5'-P-CCNC: 22 U/L (ref 0–45)
BACTERIA BLD CULT: NO GROWTH
BACTERIA BLD CULT: NO GROWTH
BACTERIA FLD CULT: NO GROWTH
BILIRUB SERPL-MCNC: 1.1 MG/DL (ref 0.2–1.3)
BUN SERPL-MCNC: 4 MG/DL (ref 7–30)
CALCIUM SERPL-MCNC: 8 MG/DL (ref 8.5–10.1)
CHLORIDE BLD-SCNC: 105 MMOL/L (ref 94–109)
CO2 SERPL-SCNC: 26 MMOL/L (ref 20–32)
CREAT SERPL-MCNC: 0.69 MG/DL (ref 0.52–1.04)
ERYTHROCYTE [DISTWIDTH] IN BLOOD BY AUTOMATED COUNT: 15.8 % (ref 10–15)
GFR SERPL CREATININE-BSD FRML MDRD: >90 ML/MIN/1.73M2
GLUCOSE BLD-MCNC: 83 MG/DL (ref 70–99)
GRAM STAIN RESULT: NORMAL
GRAM STAIN RESULT: NORMAL
HCT VFR BLD AUTO: 26.2 % (ref 35–47)
HGB BLD-MCNC: 8.3 G/DL (ref 11.7–15.7)
INR PPP: 1.86 (ref 0.85–1.15)
MCH RBC QN AUTO: 31 PG (ref 26.5–33)
MCHC RBC AUTO-ENTMCNC: 31.7 G/DL (ref 31.5–36.5)
MCV RBC AUTO: 98 FL (ref 78–100)
PLATELET # BLD AUTO: 178 10E3/UL (ref 150–450)
POTASSIUM BLD-SCNC: 3.4 MMOL/L (ref 3.4–5.3)
PROT SERPL-MCNC: 6 G/DL (ref 6.8–8.8)
RBC # BLD AUTO: 2.68 10E6/UL (ref 3.8–5.2)
SODIUM SERPL-SCNC: 137 MMOL/L (ref 133–144)
WBC # BLD AUTO: 14.3 10E3/UL (ref 4–11)

## 2021-08-19 PROCEDURE — 250N000013 HC RX MED GY IP 250 OP 250 PS 637: Performed by: STUDENT IN AN ORGANIZED HEALTH CARE EDUCATION/TRAINING PROGRAM

## 2021-08-19 PROCEDURE — 85027 COMPLETE CBC AUTOMATED: CPT | Performed by: INTERNAL MEDICINE

## 2021-08-19 PROCEDURE — 999N000033 HC STATISTIC CHRONIC PULMONARY DISEASE SPECIALIST

## 2021-08-19 PROCEDURE — 36415 COLL VENOUS BLD VENIPUNCTURE: CPT | Performed by: INTERNAL MEDICINE

## 2021-08-19 PROCEDURE — 99232 SBSQ HOSP IP/OBS MODERATE 35: CPT | Mod: GC | Performed by: INTERNAL MEDICINE

## 2021-08-19 PROCEDURE — 250N000011 HC RX IP 250 OP 636: Performed by: INTERNAL MEDICINE

## 2021-08-19 PROCEDURE — 80053 COMPREHEN METABOLIC PANEL: CPT | Performed by: INTERNAL MEDICINE

## 2021-08-19 PROCEDURE — 250N000013 HC RX MED GY IP 250 OP 250 PS 637: Performed by: INTERNAL MEDICINE

## 2021-08-19 PROCEDURE — 120N000002 HC R&B MED SURG/OB UMMC

## 2021-08-19 PROCEDURE — 85610 PROTHROMBIN TIME: CPT | Performed by: INTERNAL MEDICINE

## 2021-08-19 PROCEDURE — 97110 THERAPEUTIC EXERCISES: CPT | Mod: GP | Performed by: REHABILITATION PRACTITIONER

## 2021-08-19 PROCEDURE — 99232 SBSQ HOSP IP/OBS MODERATE 35: CPT | Performed by: INTERNAL MEDICINE

## 2021-08-19 RX ORDER — METHOCARBAMOL 500 MG/1
500 TABLET, FILM COATED ORAL EVERY 6 HOURS
Status: DISCONTINUED | OUTPATIENT
Start: 2021-08-19 | End: 2021-08-23 | Stop reason: HOSPADM

## 2021-08-19 RX ADMIN — SPIRONOLACTONE 100 MG: 100 TABLET ORAL at 09:03

## 2021-08-19 RX ADMIN — FUROSEMIDE 40 MG: 40 TABLET ORAL at 09:03

## 2021-08-19 RX ADMIN — CEFTRIAXONE 1 G: 1 INJECTION, POWDER, FOR SOLUTION INTRAMUSCULAR; INTRAVENOUS at 09:04

## 2021-08-19 RX ADMIN — METHOCARBAMOL 500 MG: 500 TABLET ORAL at 17:03

## 2021-08-19 RX ADMIN — FOLIC ACID 1 MG: 1 TABLET ORAL at 09:03

## 2021-08-19 RX ADMIN — GABAPENTIN 100 MG: 100 CAPSULE ORAL at 13:14

## 2021-08-19 RX ADMIN — PANTOPRAZOLE SODIUM 40 MG: 40 TABLET, DELAYED RELEASE ORAL at 09:03

## 2021-08-19 RX ADMIN — GABAPENTIN 100 MG: 100 CAPSULE ORAL at 20:08

## 2021-08-19 RX ADMIN — THIAMINE HCL TAB 100 MG 100 MG: 100 TAB at 09:03

## 2021-08-19 RX ADMIN — METHOCARBAMOL 500 MG: 500 TABLET ORAL at 10:49

## 2021-08-19 RX ADMIN — OXYCODONE 5 MG: 5 TABLET ORAL at 17:03

## 2021-08-19 RX ADMIN — GABAPENTIN 100 MG: 100 CAPSULE ORAL at 09:03

## 2021-08-19 RX ADMIN — DULOXETINE 20 MG: 20 CAPSULE, DELAYED RELEASE ORAL at 09:03

## 2021-08-19 RX ADMIN — PANTOPRAZOLE SODIUM 40 MG: 40 TABLET, DELAYED RELEASE ORAL at 17:03

## 2021-08-19 RX ADMIN — OXYCODONE 5 MG: 5 TABLET ORAL at 10:49

## 2021-08-19 RX ADMIN — OXYCODONE 5 MG: 5 TABLET ORAL at 04:29

## 2021-08-19 RX ADMIN — LACTULOSE 20 G: 10 POWDER, FOR SOLUTION ORAL at 09:03

## 2021-08-19 ASSESSMENT — ACTIVITIES OF DAILY LIVING (ADL)
ADLS_ACUITY_SCORE: 18
ADLS_ACUITY_SCORE: 16
ADLS_ACUITY_SCORE: 18
ADLS_ACUITY_SCORE: 18
ADLS_ACUITY_SCORE: 16
ADLS_ACUITY_SCORE: 18

## 2021-08-19 ASSESSMENT — MIFFLIN-ST. JEOR: SCORE: 1714.13

## 2021-08-19 NOTE — PROVIDER NOTIFICATION
"Provider notified (name): Estephania Stern  Reason for notification: Increased pain   Recommendation/request given to provider: \"Reporting 10/10 pain in abdomen and extremities, pt requesting one time dilaudid dose\"  Response from provider: Robaxin switched from PRN to scheduled   "

## 2021-08-19 NOTE — PROGRESS NOTES
"Hepatology Progress Note    Date of Service: August 19, 2021     SUBJECTIVE:  Eating well this AM - no nausea  Still with abdominal pain    Review of Systems  A complete 10 point review of systems was asked and answered in the negative unless specifically commented upon in the HPI    OBJECTIVE:  Blood pressure 105/52, pulse 94, temperature 97  F (36.1  C), temperature source Oral, resp. rate 19, height 1.6 m (5' 3\"), weight 107.5 kg (236 lb 15.9 oz), SpO2 96 %.  Physical Exam  Constitutional: Well-developed, well-nourished, mild apparent distress.    HEENT: Normocephalic. no scleral icterus.   Respiratory: Normal respiratory excursion   GI: obese, distended.  Tenderness throughout abdomen to light touch.  No overt erythema or lesions  Skin:  Skin is warm and dry.   Peripheral Vascular: + lower extremity edema. 2+ pulses in all extremities  Musculoskeletal:  ROM intact, normal muscle bulk    Psychiatric: Normal mood and affect. Behavior is normal.  Neuro:  no asterixis, no tremor    Labs: reviewed    Imaging: reviewed    ASSESSMENT/PLAN:   41y F with PMhx of alcohol use disorder now with cirrhosis admitted with hematemesis and volume overload  - Seems her pain is related to anasarca in the setting of hyperalgesia    RECS:  - Consider initiation of SSRI/SNRI such as duloxetine for adjunctive pain management with hyperalgesia  - continued aggressive diuresis  - Gabapentin for pain and to decrease alcohol cravings  - Continue lactulose    Thomas M. Leventhal, M.D.   of Medicine  Advanced/Transplant Hepatology & Critical Care Medicine  [facility]      "

## 2021-08-19 NOTE — PLAN OF CARE
"Assumed cares 0700 - 1500    /52 (BP Location: Left arm)   Pulse 94   Temp 97  F (36.1  C) (Oral)   Resp 19   Ht 1.6 m (5' 3\")   Wt 107.5 kg (236 lb 15.9 oz)   SpO2 96%   BMI 41.98 kg/m      A&Ox4. VSS on RA. Reported pain in abdomen and bilateral lower extremities partially controlled with oxy. Denied SOB and N/V. SBA to commode. BM x1. 2 g NA diet. Para site bruised and slightly leaking. L PIV x2 saline locked.   "

## 2021-08-19 NOTE — PROGRESS NOTES
[facility]    Progress Note - [facility] 1 Service        Date of Admission:  8/13/2021    Assessment & Plan      Maricruz Lechuga is a 41 year old female with a PMHX significant for acute alcoholic hepatitis s/p steroids (pt did not complete the treatment), decompensated alcoholic cirrhosis with ascites and coagulopathy, morbid obesity, tobacco dependence, presented with abdominal pain and BLE swelling, hematemesis and melena, both resolved, and was found to have low hemoglobin. Pt is stable, no on IV medications, and is pending TCU placement.     Today's Updates  - repeat infectious workup and paracentesis yesterday was negative  - awaiting TCU placement  - schedule methocarbamol, avoid IV opiates    Abdominal pain  Left hepatic lobe hypodense lesion  Patient has chronic generalized abdominal pain that has acutely worsened, in addition to sharp RUQ pain that is reproducible on exam. Patient has had multiple admissions over the past month due to abdominal pain. Hospitalized on 6/24/2021 at [facility] in [place] and placed on a 28-day course of prednisolone which patient did not complete. Then admitted to [facility] on 7/8/2021 for ongoing pain and had an ER visit on 7/22/2021 again for ongoing abdominal pain. Previous CT scan in July 2021 showed hepatocellular disease and steatosis with portal hypertension including splenomegaly and recanalization of the umbilical vein also mild to moderate ascites and moderate anasarca. Repeat CT scan here showed the same finding and a hypodense liver lesion involving left hepatic lobe, though second CT on 8/18 here did not demonstrate that lesion well. Her prior hepatitis serologies were negative. Pt was positive for hep C antibody but her PCR was undetectable. Patient has had several paracenteses which did not improve her pain. Abdominal pain could be 2/2 to cirrhosis.   - schedule methocarbamol 500mg QID  -  continue oxycodone 5 mg Q6H PRN  - continue tylenol 650 mg Q8H  - continue PO gabapentin  - continue PO duloxetine  - outpatient work up for liver lesion, possibly resolved    Decompensated alcoholic cirrhosis c/b ascites  Hx of alcoholic hepatitis (s/p incomplete treatment)   Patient was recently diagnosed with acute alcoholic hepatitis. She was started on steroid that showed improvement however patient did not complete the steroid treatment. Liver labs WNL. No documented hx of esophageal varices. Recent EGD at the OSH showed gastritis. No prior HE however patient was on lactulose PTA. She has had large volume ascites and has undergone paracentesis several times. Her last paracentesis was on 08/02, at that time 4.4 L were removed. No prior hx of SBP, not on SBP prophylaxis PTA.    MELD-Na score: 14 at 8/19/2021  6:05 AM  MELD score: 14 at 8/19/2021  6:05 AM  Calculated from:  Serum Creatinine: 0.71 mg/dL (Using min of 1 mg/dL) at 8/18/2021  5:38 AM  Serum Sodium: 137 mmol/L at 8/19/2021  6:05 AM  Total Bilirubin: 0.7 mg/dL (Using min of 1 mg/dL) at 8/18/2021  5:38 AM  INR(ratio): 1.86 at 8/19/2021  6:05 AM  Age: 41 years    - paracentesis completed 8/16 and 8/18, no evidence of SBP   - Follow fluid culture 8/18  - GI consulted, appreciated recs   - No immediate plan for EGD   - ceftriaxone x 5 days (8/18-8/22)   - s/p albumin 25% 50g on 8/18  - continue lasix 40 mg daily  - continue spironolactone to 100 mg daily  - lactulose once daily   - CMP and INR daily    Chronic leukocytosis  Could be due to alcoholic hepatitis. Vitally stable on admission. No fever or chills. No localizing s/s of infection aside from abdominal pain. Infectious work up including BCX, CXR, UA negative. Paracentesis fluid testing 8/13 neg for SBP. Repeat infectious workup 8/18: UA negative, CXR with trace mixed interstitial and streaky opacities likely atelectasis vs atypical infection, CT AP with contrast showing stable chronic liver  disease/portal hypertension, moderate ascites and diffuse anasarca but no other changes, repeat paracentesis not concerning for infection, BC NGTD.  - ceftriaxone for SBP as above, ceftriaxone 1g x 5 days (8/18-8/22)  - monitor CBC    Lactic acidosis  Likely 2/2 intravascular volume depletion in the setting of anasarca and decreased clearance due to liver disease.   - no need to trend lactate  - infectious workup above    Hematemesis, resolved  Hx of melena, resolved  Patient reported small volume hematemesis. On presentation she was vitally stable then became a little more tachycardic. Repeat CBC showed low Hgb at 7.7 however all 3 cell lines were dropped, could be dilutional vs acute bleeding. No further episodes after admission. She was started on IV octreotide and pantoprazole. As per chart review she had a recent EGD that showed gastritis.   - GI consulted, appreciate recs  - 2g sodium diet  - continue PO pantoprazole bid    Bilateral lower extremity edema  Patient complained of bilateral lower extremity edema. PTA she was on spironolactone only which was stopped due to BROOKE. She also complained of bilateral lower extremity pain. No DVT on dopplers. No signs of cellulitis. Patient also has ascites and has been frequently getting paracentesis, concerns for anasarca. No known kidney or heart problem or any enteropathy. She has alcoholic liver cirrhosis and albumin is low however other liver labs are normal. TSH wnl. No protein on UA however her urine protein/creatinine ratio was elevated on admission. US abdomen also revealed normal kidneys. Has had good UOP with current diuretic regimen.  - lymphedema consult, appreciate cares  - continue PO lasix 40mg daily  - continue [id number] daily  - monitor BMP    Acute on chronic macrocytic anemia  Patient has borderline macrocytosis. Her baseline hgb appears to be around 8-9. No ongoing bleeding but patient reported small volume hematemesis as above. She is  hemodynamically stable. Concerned that another process might be driving the anemia, possibly related to alcohol use though patient denies alcohol use in past year. Folate is low. Vit B12 wnl. Retic count is appropriate. Peripheral smear 8/14 with moderate normochromic/normocytic anemia without hemolysis, slight neutrophilic leukocytosis. LDH normal.   - CBC daily    Polysubstance use disorder  Pt was found to have opioids in her urine tox screen. Pt has a hx of alcohol use, however reports her last drink was 1 year ago. Patient reports smoking 1-2 cigarettes.   - patient declines addiction medicine/chemical dependency consults  - nicotine patch not indicated    Hx of Yeast cystitis  - s/p 14 day course of  fluconazole      Diet: 2 Gram Sodium Diet  Snacks/Supplements Adult: Other; Ensure plant base chocolate at 2:00 and at HS daily; Between Meals    DVT Prophylaxis: INR elevated, ambulation   Anderson Catheter: Not present  Fluids: None  Central Lines: None  Code Status: Full Code      Disposition Plan   Expected discharge: medically ready for discharge, recommended to transitional care unit once bed available.     The patient's care was discussed with the Attending Physician, Dr. Leger.    Estephania Stern MD  [address]  Securely message with the Vocera Web Console (learn more here)  Text page via [employer] Paging/Directory  Please see sign in/sign out for up to date coverage information    ______________________________________________________________________    Interval History   NAEO. Has been feeling okay, used oxycodone x3 and robaxin x2, had increased pain this morning after not taking pain medications overnight and was asking for IV dilaudid. No nausea or vomiting, tolerating food well but has had decreased appetite over last day or so. Feeling tired and not moving around much. No chest pain or shortness of breath. Leg swelling is about the same. No  other new concerns or complaints.    Data reviewed today: I reviewed all medications, new labs and imaging results over the last 24 hours.     Physical Exam   Vital Signs: Temp: 96.9  F (36.1  C) Temp src: Oral BP: 106/59 Pulse: 83   Resp: 16 SpO2: 98 % O2 Device: None (Room air)    Weight: 246 lbs 8.29 oz     Gen: sleeping but awakens to voice, appears comfortable at rest but grimaces and holds abdomen with movement and exam, similar to prior  HEENT: head atraumatic and normocephalic, hearing grossly normal, pupils equal and round, EOMI, no conjunctival injection or scleral icterus, no nasal drainage, missing multiple upper teeth with very poor dentition  Neck:  normal ROM  CV: RRR, normal S1 and S2, no murmurs, rubs, or gallops  Pulm: CTAB, no wheezes, rhonchi, or rales. No increased WOB.  Abd: distended, soft, significantly tender to palpation diffusely, worst over RUQ and around previous paracentesis site which is leaking minimal clear fluid, normal bowel sounds throughout.  Ext: pitting edema to level of the thighs with wraps in place to knees bilaterally, normal muscle tone, moves all extremities equally  Skin: warm and dry, no rashes, pallor, cyanosis, jaundice, or bruising.  Neuro: A&Ox3, answers questions appropriately, normal speech, CN II-XII grossly intact.  Psych: normal affect, makes good eye contact        Data   Recent Labs   Lab 08/19/21  0605 08/18/21  0538 08/17/21  1053 08/17/21  0544 08/16/21  0710 08/15/21  0608 08/13/21  1807   WBC 14.3* 14.5* 13.7* 14.8* 11.4* 11.4* 18.1*   HGB 8.3* 8.2* 8.1* 8.4* 7.8* 7.9*  7.9* 9.3*   MCV 98 97 98 99 100 101* 99    197 190 193 217 259 307   INR 1.86* 1.72*  --   --   --  1.69* 1.72*    137  --  136 139 139 137   POTASSIUM 3.4 3.6  --  3.5 3.6 3.7 3.3*   CHLORIDE 105 104  --  105 108 110* 106   CO2  --  29  --  27 26 22 23   BUN  --  6*  --  5* 4* 5* 8   CR  --  0.71  --  0.78 0.79 0.81 0.69   ANIONGAP  --  4  --  4 5 7 8   MELODY  --  7.8*  --   8.0* 7.8* 7.9* 7.6*   GLC  --  91  --  96 125* 133* 136*   ALBUMIN  --  2.1*  --   --   --  2.3* 1.7*   PROTTOTAL  --  6.1*  --   --   --  6.6* 6.7*   BILITOTAL  --  0.7  --   --   --  1.0 1.1   ALKPHOS  --  73  --   --   --  93 119   ALT  --  11  --   --   --  12 18   AST  --  24  --   --   --  19 21   LIPASE  --   --   --   --   --   --  <10*     Recent Results (from the past 24 hour(s))   CT Abdomen Pelvis w Contrast    Narrative    EXAMINATION: CT ABDOMEN PELVIS W CONTRAST  8/18/2021 12:13 PM      CLINICAL HISTORY: Abdominal distension; Abdominal pain, acute,  nonlocalized    COMPARISON: 8/14/2021    PROCEDURE COMMENTS: CT of the abdomen was performed with iopamidol  ([id number]) solution 135 mL  intravenous contrast. Coronal and  sagittal reformatted images were obtained.    FINDINGS:  Lower thorax:   Mild atelectasis. The heart is not enlarged. No pericardial effusion.    Abdomen and pelvis:  Redemonstration findings related to chronic liver disease, recanalized  umbilical vein, widening of the hepatic fissures, and prominent  gastrosplenic collateral vessels. Previously described hypodense  lesion in the left hepatic lobe is not well appreciated on today's  exam. No intrahepatic or extrahepatic biliary dilatation. The  gallbladder is surgically absent. No suspicious splenic masses or  lesions. Pancreatic atrophy with fatty infiltration.    The hepatic vasculature is patent. Normal caliber abdominal aorta.    The bilateral adrenal glands appear normal. No suspicious renal masses  or lesions. No hydronephrosis. The bladder is incompletely distended.  No obvious bladder masses or lesions.    There are no abnormally dilated or thickened loops of small bowel or  colon. Moderate volume ascites. There are no abnormally sized lymph  nodes.    Osseous structures and soft tissues:   Generalized soft tissue edema. No suspicious osseous masses or  lesions.      Impression    IMPRESSION:  1. Chronic liver disease with  portal hypertension.  2. Moderate volume ascites.  3. Diffuse anasarca.    I have personally reviewed the examination and initial interpretation  and I agree with the findings.    PRANAY SCOTT MD         SYSTEM ID:  [id number]   XR Chest 2 Views    Narrative    EXAM: XR CHEST 2 VW  8/18/2021 12:54 PM     HISTORY:  increasing leukocytosis, infectious workup       COMPARISON:  Chest radiograph 8/14/2021    FINDINGS:   PA and lateral view of the chest. Trachea is midline. Normal lung  volumes. Minimally increased mixed interstitial and streaky opacities.  No focal consolidative opacities or appreciable pneumothorax. Blunting  of the right costophrenic angle. Heart size and shape is within normal  limits. Bones are grossly intact.      Impression    IMPRESSION:  1. Trace mixed interstitial and streaky opacities, likely consistent  with atelectasis versus atypical infection.  2. Trace right pleural effusion.    I have personally reviewed the examination and initial interpretation  and I agree with the findings.    JESS MARTINEZ MD         SYSTEM ID:  [id number]

## 2021-08-19 NOTE — PLAN OF CARE
Pt is AO*4, denies nausea. On RA. Up w/SBA to the commode. No BM overnight, voids adequately. Bruising around paracentesis site. Pt slept throughout the shift. Oxycodone x1 was given. Follow plan of care.    Joslyn Pope RN on 8/19/2021 at 6:32 AM

## 2021-08-19 NOTE — PLAN OF CARE
"Assumed Cares: [id number]    VS: BP 95/40 (BP Location: Right arm)   Pulse 95   Temp 98.2  F (36.8  C) (Oral)   Resp 16   Ht 1.6 m (5' 3\")   Wt 111.8 kg (246 lb 8.3 oz)   SpO2 95%   BMI 43.67 kg/m    Neuro/Behavior: A&Ox4;   Pain: Pt c/o pain and given oxy which pt stated was helpful.   LDAs: PIV SL.   Resp: WDL, no dyspnea reported  Cardiac: denies chest pain, pulses equal bilaterally, x hypotensive.   GI: good appetite. Paracentesis site done and labs sent down. No BM this shift; did have stools this morning.   : WDL    Nutrition/endocrine: good appetite.   Skin: Ecchymotic skin, and paracentesis site.   Activity: SBA to commode. Moves ind in bed.     Events: paracentesis done to culture fluid. Labs sent. Pain controlled well with oxy.   Plan: Infectious workup and pain control. Continue to monitor and follow POC.     "

## 2021-08-19 NOTE — PROGRESS NOTES
"[facility]    Hepatology Follow-up    CC: Hematemesis    Dx: Decompensated alcoholic cirrhosis                  Macrocytic anemia    24 hour events:   JASPREET  Subjective:  C/o abd pain, no bleeding    Medications  Current Facility-Administered Medications   Medication Dose Route Frequency     acetaminophen  650 mg Oral Q8H     cefTRIAXone  1 g Intravenous Q24H     diclofenac  2 g Topical 4x Daily     DULoxetine  20 mg Oral Daily     folic acid  1 mg Oral Daily     furosemide  40 mg Oral Daily     gabapentin  100 mg Oral TID     lactulose  20 g Oral Daily     methocarbamol  500 mg Oral Q6H     pantoprazole  40 mg Oral BID AC     polyethylene glycol  17 g Oral BID     sodium chloride (PF)  3 mL Intracatheter Q8H     spironolactone  100 mg Oral Daily     vitamin B1  100 mg Oral Daily       Review of systems  A 10-point review of systems was negative.    Examination  /59 (BP Location: Left arm)   Pulse 83   Temp 96.9  F (36.1  C) (Oral)   Resp 16   Ht 1.6 m (5' 3\")   Wt 107.5 kg (236 lb 15.9 oz)   SpO2 98%   BMI 41.98 kg/m      Intake/Output Summary (Last 24 hours) at 8/16/2021 1104  Last data filed at 8/16/2021 0845  Gross per 24 hour   Intake 630 ml   Output 400 ml   Net 230 ml       Constitutional: cooperative, pleasant  Eyes: Sclera anicteric  Ears/nose/mouth/throat: poor dentition  Neck: supple  CV: 1+ edema  Respiratory: Unlabored breathing  Abd: Mild distended, +bs, RLQ and LLQ tender  Skin: warm, perfused, no jaundice  Neuro: AAO x 3, No asterixis      MELD-Na score: 14 at 8/19/2021  6:05 AM  MELD score: 14 at 8/19/2021  6:05 AM  Calculated from:  Serum Creatinine: 0.69 mg/dL (Using min of 1 mg/dL) at 8/19/2021  6:05 AM  Serum Sodium: 137 mmol/L at 8/19/2021  6:05 AM  Total Bilirubin: 1.1 mg/dL at 8/19/2021  6:05 AM  INR(ratio): 1.86 at 8/19/2021  6:05 AM  Age: 41 years    Radiology  US RUQ  8/14/21:  Impression:   1.  Liver demonstrates coarsened parenchymal echotexture " which can be  seen in setting of chronic parenchymal disease.  2.  Moderate volume ascites.  3.  Patent right upper quadrant vasculature on Doppler evaluation.  4.  Gallbladder is not seen.  5.  Splenomegaly.    CT abd 8/18/21:  IMPRESSION:  1. Chronic liver disease with portal hypertension.  2. Moderate volume ascites.  3. Diffuse anasarca.    Assessment  41 year old with history of decompensated alcoholic cirrhosis, complicated by ascites, nicotine dependence, chronic abdominal pain who was admitted with concern for episode of hematemesis.    Macrocytic anemia  Patient came to the hospital for abdominal pain, she reported vomiting with some blood 2 days before admission.  Her hemoglobin is stable with no source of bleeding.     Hx of alcoholic hepatitis/cirrhosis  Pt had alcoholic hepatitis, was on steroids but didot finish the course. LFTs are not elevated and not suggestive of alc hepatitis anymore. Her US shows patent vasculature with antegrade flow, mild splenomegaly, albumin slightly low but platelet count within normal limit, given the overall picture less likely to have cirrhosis.  Her recent PETH is negative.    Chronic abdominal pain  Patient has CT scan negative for any acute pathology except diffuse anasarca which likely is causing increased somatic sensation causing pain.  Her pain can be better optimized with SSRIs altering the pain perception.    Recommendations  -- Continue PPI p.o. twice daily  -- Continue antibiotic course for 5 days  --Recommend to start duloxetine for abdominal pain  -- Lactulose PO, titrate for 3-4 bowel movements  -- Monitor transaminases, bilirubin, INR  -- Ensure sodium restriction to 2000 mg per day  -- Continue Lasix at 40mg and spironolactone at 50mg  -- Adequate protein diet (1.2 - 1.5g/kg/day)   - Recommend multiple meals during the day, Snacks and shakes during the day/night   - Can use Ensure/Boost drinks TID  -- Chem dep consultation  -- PT/OT and ambulate  TID  --Triple phase CT as outpatient    Thank you for involving us in this patient's care. Please do not hesitate to contact the GI service with any questions or concerns.      Pt care plan discussed with Dr. Leventhal, Hepatology staff physician.    This note was created with voice recognition software, and while reviewed for accuracy, typos may remain.    Beatriz Yanez MD  [facility] Hepatology Fellow  Pager: [phone]

## 2021-08-20 ENCOUNTER — APPOINTMENT (OUTPATIENT)
Dept: OCCUPATIONAL THERAPY | Facility: CLINIC | Age: 41
End: 2021-08-20
Payer: MEDICAID

## 2021-08-20 LAB
ALBUMIN SERPL-MCNC: 2.3 G/DL (ref 3.4–5)
ALP SERPL-CCNC: 68 U/L (ref 40–150)
ALT SERPL W P-5'-P-CCNC: 11 U/L (ref 0–50)
ANION GAP SERPL CALCULATED.3IONS-SCNC: 5 MMOL/L (ref 3–14)
AST SERPL W P-5'-P-CCNC: 21 U/L (ref 0–45)
BACTERIA FLD CULT: NO GROWTH
BILIRUB SERPL-MCNC: 0.9 MG/DL (ref 0.2–1.3)
BUN SERPL-MCNC: 5 MG/DL (ref 7–30)
CALCIUM SERPL-MCNC: 8.1 MG/DL (ref 8.5–10.1)
CHLORIDE BLD-SCNC: 106 MMOL/L (ref 94–109)
CO2 SERPL-SCNC: 28 MMOL/L (ref 20–32)
CREAT SERPL-MCNC: 0.77 MG/DL (ref 0.52–1.04)
ERYTHROCYTE [DISTWIDTH] IN BLOOD BY AUTOMATED COUNT: 15.9 % (ref 10–15)
GFR SERPL CREATININE-BSD FRML MDRD: >90 ML/MIN/1.73M2
GLUCOSE BLD-MCNC: 90 MG/DL (ref 70–99)
GRAM STAIN RESULT: NORMAL
GRAM STAIN RESULT: NORMAL
HCT VFR BLD AUTO: 26.2 % (ref 35–47)
HGB BLD-MCNC: 8.4 G/DL (ref 11.7–15.7)
INR PPP: 1.73 (ref 0.85–1.15)
LACTATE SERPL-SCNC: 1 MMOL/L (ref 0.7–2)
MCH RBC QN AUTO: 31.2 PG (ref 26.5–33)
MCHC RBC AUTO-ENTMCNC: 32.1 G/DL (ref 31.5–36.5)
MCV RBC AUTO: 97 FL (ref 78–100)
PETH BLD-MCNC: NEGATIVE NG/ML
PETH BLD-MCNC: NEGATIVE NG/ML
PLATELET # BLD AUTO: 178 10E3/UL (ref 150–450)
POTASSIUM BLD-SCNC: 3.3 MMOL/L (ref 3.4–5.3)
PROT SERPL-MCNC: 6.2 G/DL (ref 6.8–8.8)
RBC # BLD AUTO: 2.69 10E6/UL (ref 3.8–5.2)
SODIUM SERPL-SCNC: 139 MMOL/L (ref 133–144)
WBC # BLD AUTO: 14.2 10E3/UL (ref 4–11)

## 2021-08-20 PROCEDURE — 85048 AUTOMATED LEUKOCYTE COUNT: CPT | Performed by: INTERNAL MEDICINE

## 2021-08-20 PROCEDURE — 36415 COLL VENOUS BLD VENIPUNCTURE: CPT | Performed by: INTERNAL MEDICINE

## 2021-08-20 PROCEDURE — 120N000002 HC R&B MED SURG/OB UMMC

## 2021-08-20 PROCEDURE — 83605 ASSAY OF LACTIC ACID: CPT | Performed by: INTERNAL MEDICINE

## 2021-08-20 PROCEDURE — 250N000011 HC RX IP 250 OP 636: Performed by: INTERNAL MEDICINE

## 2021-08-20 PROCEDURE — 250N000013 HC RX MED GY IP 250 OP 250 PS 637: Performed by: STUDENT IN AN ORGANIZED HEALTH CARE EDUCATION/TRAINING PROGRAM

## 2021-08-20 PROCEDURE — 97140 MANUAL THERAPY 1/> REGIONS: CPT | Mod: GO | Performed by: OCCUPATIONAL THERAPIST

## 2021-08-20 PROCEDURE — 99232 SBSQ HOSP IP/OBS MODERATE 35: CPT | Mod: GC | Performed by: INTERNAL MEDICINE

## 2021-08-20 PROCEDURE — 250N000013 HC RX MED GY IP 250 OP 250 PS 637: Performed by: INTERNAL MEDICINE

## 2021-08-20 PROCEDURE — 85610 PROTHROMBIN TIME: CPT | Performed by: INTERNAL MEDICINE

## 2021-08-20 PROCEDURE — 82040 ASSAY OF SERUM ALBUMIN: CPT | Performed by: INTERNAL MEDICINE

## 2021-08-20 PROCEDURE — 99231 SBSQ HOSP IP/OBS SF/LOW 25: CPT | Mod: GC | Performed by: INTERNAL MEDICINE

## 2021-08-20 RX ORDER — POTASSIUM CHLORIDE 20MEQ/15ML
20 LIQUID (ML) ORAL ONCE
Status: COMPLETED | OUTPATIENT
Start: 2021-08-20 | End: 2021-08-20

## 2021-08-20 RX ADMIN — PANTOPRAZOLE SODIUM 40 MG: 40 TABLET, DELAYED RELEASE ORAL at 07:53

## 2021-08-20 RX ADMIN — FOLIC ACID 1 MG: 1 TABLET ORAL at 07:53

## 2021-08-20 RX ADMIN — CEFTRIAXONE 1 G: 1 INJECTION, POWDER, FOR SOLUTION INTRAMUSCULAR; INTRAVENOUS at 08:51

## 2021-08-20 RX ADMIN — PANTOPRAZOLE SODIUM 40 MG: 40 TABLET, DELAYED RELEASE ORAL at 16:15

## 2021-08-20 RX ADMIN — METHOCARBAMOL 500 MG: 500 TABLET ORAL at 21:45

## 2021-08-20 RX ADMIN — POTASSIUM CHLORIDE 20 MEQ: 20 SOLUTION ORAL at 16:15

## 2021-08-20 RX ADMIN — SPIRONOLACTONE 100 MG: 100 TABLET ORAL at 07:53

## 2021-08-20 RX ADMIN — FUROSEMIDE 40 MG: 40 TABLET ORAL at 07:53

## 2021-08-20 RX ADMIN — OXYCODONE 5 MG: 5 TABLET ORAL at 06:27

## 2021-08-20 RX ADMIN — GABAPENTIN 100 MG: 100 CAPSULE ORAL at 15:03

## 2021-08-20 RX ADMIN — THIAMINE HCL TAB 100 MG 100 MG: 100 TAB at 07:53

## 2021-08-20 RX ADMIN — GABAPENTIN 100 MG: 100 CAPSULE ORAL at 20:33

## 2021-08-20 RX ADMIN — OXYCODONE 5 MG: 5 TABLET ORAL at 12:07

## 2021-08-20 RX ADMIN — OXYCODONE 5 MG: 5 TABLET ORAL at 17:58

## 2021-08-20 RX ADMIN — METHOCARBAMOL 500 MG: 500 TABLET ORAL at 15:03

## 2021-08-20 RX ADMIN — METHOCARBAMOL 500 MG: 500 TABLET ORAL at 09:16

## 2021-08-20 RX ADMIN — DULOXETINE 20 MG: 20 CAPSULE, DELAYED RELEASE ORAL at 07:53

## 2021-08-20 RX ADMIN — GABAPENTIN 100 MG: 100 CAPSULE ORAL at 07:53

## 2021-08-20 RX ADMIN — OXYCODONE 5 MG: 5 TABLET ORAL at 00:07

## 2021-08-20 ASSESSMENT — ACTIVITIES OF DAILY LIVING (ADL)
ADLS_ACUITY_SCORE: 16

## 2021-08-20 ASSESSMENT — MIFFLIN-ST. JEOR: SCORE: 1669.13

## 2021-08-20 NOTE — PLAN OF CARE
Pt is AO*4, denies nausea. On RA. Up w/SBA to the commode. No BM overnight, voids adequately. Bruising around paracentesis site. Pt slept throughout the shift. Oxycodone x2 was given. Follow plan of care.    Joslyn Pope RN on 8/20/2021 at 6:54 AM

## 2021-08-20 NOTE — PROGRESS NOTES
Care Management Follow Up    Length of Stay (days): 7    Expected Discharge Date: 08/23/2021     Concerns to be Addressed: discharge planning     Patient plan of care discussed at interdisciplinary rounds: Yes    Anticipated Discharge Disposition: Skilled Nursing Facilty, Transitional Care     Anticipated Discharge Services: None  Anticipated Discharge DME: None    Patient/family educated on Medicare website which has current facility and service quality ratings: yes  Education Provided on the Discharge Plan: Yes  Patient/Family in Agreement with the Plan: yes    Referrals Placed by CM/SW:     [facility]  [address] [place] [address]  [phone]  8/20- referral faxed    [facility]  [address] [place] [address] [phone]  8/20- referral faxed    [facility]  [address], [place] [address] [phone]  8/19- left VM with admissions.     Declined  [facility]  [address] [place], [place] [address] [phone]  8/19- received voicemail from admissions that they cannot accept pt, no reason given.       Private pay costs discussed: Not applicable    Additional Information:  SW following for TCU placement. Received updates noted above. Additional TCU referrals made based on pt's preference to be near her home in [place], [place].     WENDY Riley, [facility]  [address]   [place]  Phone: [phone]  Pager: [phone]

## 2021-08-20 NOTE — PROGRESS NOTES
[facility]    Progress Note - Onofre 1 Service        Date of Admission:  8/13/2021    Assessment & Plan      Maricruz Lechuga is a 41 year old female with a PMHX significant for acute alcoholic hepatitis s/p steroids (pt did not complete the treatment), decompensated alcoholic cirrhosis with ascites and coagulopathy, morbid obesity, tobacco dependence, presented with abdominal pain and BLE swelling, hematemesis and melena, both resolved, and was found to have low hemoglobin. Pt is stable, no on IV medications, and is pending TCU placement.     Today's Updates  - awaiting TCU placement  - continue pain regimen, pt encouraged to take scheduled tylenol and robaxin, avoid IV opiates    Abdominal pain  Left hepatic lobe hypodense lesion  Patient has chronic generalized abdominal pain that has acutely worsened, in addition to sharp RUQ pain that is reproducible on exam. Patient has had multiple admissions over the past month due to abdominal pain. Hospitalized on 6/24/2021 at [facility] in [place] and placed on a 28-day course of prednisolone which patient did not complete. Then admitted to [facility] on 7/8/2021 for ongoing pain and had an ER visit on 7/22/2021 again for ongoing abdominal pain. Previous CT scan in July 2021 showed hepatocellular disease and steatosis with portal hypertension including splenomegaly and recanalization of the umbilical vein also mild to moderate ascites and moderate anasarca. Repeat CT scan here showed the same finding and a hypodense liver lesion involving left hepatic lobe, though second CT on 8/18 here did not demonstrate that lesion well. Her prior hepatitis serologies were negative. Pt was positive for hep C antibody but her PCR was undetectable. Patient has had several paracenteses which did not improve her pain. Abdominal pain could be 2/2 to cirrhosis.   - patient encouraged to take scheduled non-opiate pain  medications and to only use oxycodone as needed  - continue tylenol 650 mg Q8H   - continue methocarbamol 500mg QID  - continue oxycodone 5 mg Q6H PRN  - continue PO gabapentin  - continue PO duloxetine  - PT/OT consults, ambulate TID  - outpatient work up for liver lesion, possibly resolved    Decompensated alcoholic cirrhosis c/b ascites  Hx of alcoholic hepatitis (s/p incomplete treatment)   Patient was recently diagnosed with acute alcoholic hepatitis. She was started on steroid that showed improvement however patient did not complete the steroid treatment. Liver labs WNL. No documented hx of esophageal varices. Recent EGD at the OSH showed gastritis. No prior HE however patient was on lactulose PTA. She has had large volume ascites and has undergone paracentesis several times. Her last paracentesis was on 08/02, at that time 4.4 L were removed. No prior hx of SBP, not on SBP prophylaxis PTA.    MELD-Na score: 12 at 8/20/2021  7:01 AM  MELD score: 12 at 8/20/2021  7:01 AM  Calculated from:  Serum Creatinine: 0.77 mg/dL (Using min of 1 mg/dL) at 8/20/2021  7:01 AM  Serum Sodium: 139 mmol/L (Using max of 137 mmol/L) at 8/20/2021  7:01 AM  Total Bilirubin: 0.9 mg/dL (Using min of 1 mg/dL) at 8/20/2021  7:01 AM  INR(ratio): 1.73 at 8/20/2021  7:01 AM  Age: 41 years    - paracentesis completed 8/16 and 8/18, no evidence of SBP   - Follow fluid culture 8/18, NGTD  - GI consulted, appreciated recs   - No immediate plan for EGD   - Plan triple phase CT as outpatient   - Ceftriaxone x 5 days (8/18-8/22)   - s/p albumin 25% 50g on 8/18  - continue lasix 40 mg daily  - continue spironolactone to 100 mg daily  - lactulose once daily, titrate to 3-4 stools daily  - CMP and INR daily    Chronic leukocytosis  Could be due to alcoholic hepatitis. Vitally stable on admission. No fever or chills. No localizing s/s of infection aside from abdominal pain. Infectious work up including BCX, CXR, UA negative. Paracentesis fluid  testing 8/13 neg for SBP. Repeat infectious workup 8/18: UA negative, CXR with trace mixed interstitial and streaky opacities likely atelectasis vs atypical infection, CT AP with contrast showing stable chronic liver disease/portal hypertension, moderate ascites and diffuse anasarca but no other changes, repeat paracentesis not concerning for infection, BC NGTD.  - ceftriaxone 1g x 5 days (8/18-8/22) as above  - monitor CBC    Lactic acidosis  Likely 2/2 intravascular volume depletion in the setting of anasarca and decreased clearance due to liver disease.   - no need to trend lactate    Hematemesis, resolved  Hx of melena, resolved  Patient reported small volume hematemesis. On presentation she was vitally stable then became a little more tachycardic. Repeat CBC showed low Hgb at 7.7 however all 3 cell lines were dropped, could be dilutional vs acute bleeding. No further episodes after admission. She was started on IV octreotide and pantoprazole. As per chart review she had a recent EGD that showed gastritis.   - GI consulted, appreciate recs  - 2g sodium diet  - Continue PO pantoprazole bid    Bilateral lower extremity edema  Patient complained of bilateral lower extremity edema. PTA she was on spironolactone only which was stopped due to BROOKE. She also complained of bilateral lower extremity pain. No DVT on dopplers. No signs of cellulitis. Patient also has ascites and has been frequently getting paracentesis, concerns for anasarca. No known kidney or heart problem or any enteropathy. She has alcoholic liver cirrhosis and albumin is low however other liver labs are normal. TSH wnl. No protein on UA however her urine protein/creatinine ratio was elevated on admission. US abdomen also revealed normal kidneys. Has had good UOP with current diuretic regimen.  - lymphedema consult, appreciate cares  - continue PO lasix 40mg daily  - continue [id number] daily  - monitor BMP    Acute on chronic macrocytic  anemia  Patient has borderline macrocytosis. Her baseline hgb appears to be around 8-9. No ongoing bleeding but patient reported small volume hematemesis as above. She is hemodynamically stable. Concerned that another process might be driving the anemia, possibly related to alcohol use though patient denies alcohol use in past year. Folate is low. Vit B12 wnl. Retic count is appropriate. Peripheral smear 8/14 with moderate normochromic/normocytic anemia without hemolysis, slight neutrophilic leukocytosis. LDH normal.   - CBC daily    Hypokalemia  - 20mEq potassium chloride replaced  - BMP in AM    Polysubstance use disorder  Pt was found to have opioids in her urine tox screen. Pt has a hx of alcohol use, however reports her last drink was 1 year ago. Patient reports smoking 1-2 cigarettes.   - patient declines addiction medicine/chemical dependency consults  - nicotine patch not indicated    Hx of Yeast cystitis  - s/p 14 day course of  fluconazole      Diet: 2 Gram Sodium Diet  Snacks/Supplements Adult: Other; Ensure plant base chocolate at 2:00 and at HS daily; Between Meals    DVT Prophylaxis: INR elevated, ambulation   Anderson Catheter: Not present  Fluids: None  Central Lines: None  Code Status: Full Code      Disposition Plan   Expected discharge: medically ready for discharge, recommended to transitional care unit once bed available.     The patient's care was discussed with the Attending Physician, Dr. Leger.    Estephania Stern MD  [address]  Securely message with the Vocera Web Console (learn more here)  Text page via [employer] Paging/Directory  Please see sign in/sign out for up to date coverage information    ______________________________________________________________________    Interval History   MAHOGANY Alcantara continues to have abdominal pain, stable from previous. Did refuse scheduled robaxin and tylenol, then asked for more oxycodone this  morning. Was encouraged to use scheduled medications and oxycodone only for breakthrough pain. No nausea or vomiting, tolerating diet. Leg swelling is stable from previous. Not getting up too much and mostly sleeping during the day. No other new concerns or complaints.    Data reviewed today: I reviewed all medications, new labs and imaging results over the last 24 hours.     Physical Exam   Vital Signs: Temp: 98.3  F (36.8  C) Temp src: Oral BP: 109/56 Pulse: 102   Resp: 19 SpO2: 97 % O2 Device: None (Room air)    Weight: 236 lbs 15.91 oz     Gen: sleeping but awakens to voice, appears comfortable at rest but grimaces and holds abdomen with movement and exam, similar to prior  HEENT: head atraumatic and normocephalic, hearing grossly normal, pupils equal and round, EOMI, no conjunctival injection or scleral icterus, no nasal drainage, missing multiple upper teeth with very poor dentition, small bleeding laceration to right oral commisure  Neck:  normal ROM  CV: RRR, normal S1 and S2, no murmurs, rubs, or gallops  Pulm: CTAB, no wheezes, rhonchi, or rales. No increased WOB.  Abd: distended, soft, significantly tender to light palpation diffusely, worst over RUQ and around previous paracentesis site which is leaking minimal clear fluid, normal bowel sounds throughout.  Ext: pitting edema dependently to level of the thighs with wraps in place to knees bilaterally, normal muscle tone, moves all extremities equally  Skin: warm and dry, no rashes, pallor, cyanosis, jaundice, or bruising.  Neuro: A&Ox3, answers questions appropriately, normal speech, CN II-XII grossly intact.  Psych: normal affect, makes good eye contact    Data   Recent Labs   Lab 08/20/21  0701 08/19/21  0605 08/18/21  0538 08/13/21  1807   WBC 14.2* 14.3* 14.5* 18.1*   HGB 8.4* 8.3* 8.2* 9.3*   MCV 97 98 97 99    178 197 307   INR 1.73* 1.86* 1.72* 1.72*    137 137 137   POTASSIUM 3.3* 3.4 3.6 3.3*   CHLORIDE 106 105 104 106   CO2 28 26 29  23   BUN 5* 4* 6* 8   CR 0.77 0.69 0.71 0.69   ANIONGAP 5 6 4 8   MELODY 8.1* 8.0* 7.8* 7.6*   GLC 90 83 91 136*   ALBUMIN 2.3* 2.4* 2.1* 1.7*   PROTTOTAL 6.2* 6.0* 6.1* 6.7*   BILITOTAL 0.9 1.1 0.7 1.1   ALKPHOS 68 64 73 119   ALT 11 13 11 18   AST 21 22 24 21   LIPASE  --   --   --  <10*     No results found for this or any previous visit (from the past 24 hour(s)).

## 2021-08-20 NOTE — PLAN OF CARE
Patient lying in bed for most of the shift, appeared tired an weak but easily be awaken and verbally responsive. Oriented x4, calm and cooperative with cares. Bilateral Lower ext still edematous. Good urine output and no BM today. Complained of abdominal pain and was given oxycodone which offered good relief thereafter. Requested for extra dose of pain med and MD notified. Was seen and rounded by primary team with no plans to change in pain med regimen as of this time. Rocephin for Abx. Paracentesis site dressing Clean dry and intact.  P: Administer Abx as ordered. Continue with plan of care.

## 2021-08-20 NOTE — PLAN OF CARE
"Assumed Cares: 5610-6725     VS: BP 92/46   Pulse 97   Temp 98.1  F (36.7  C) (Oral)   Resp 17   Ht 1.6 m (5' 3\")   Wt 107.5 kg (236 lb 15.9 oz)   SpO2 97%   BMI 41.98 kg/m    Neuro/Behavior: A&Ox4;   Pain: Pain in abd and legs taking prn oxy.   LDAs: PIV SL.   Resp: WDL, no dyspnea reported. On room air.   Cardiac: denies chest pain, pulses equal bilaterally.  GI: abd distention and some pain. No nausea. Great appetite.   : WDL    Nutrition/endocrine: good appetite; eating 100%   Skin: Ecchymotic skin, and paracentesis site.   Activity: SBA to commode. Moves ind in bed.      Events: No acute events. Pt in bed resting and watching television.   Plan: Infectious workup and pain control. Continue to monitor and follow POC  "

## 2021-08-21 ENCOUNTER — APPOINTMENT (OUTPATIENT)
Dept: GENERAL RADIOLOGY | Facility: CLINIC | Age: 41
End: 2021-08-21
Attending: PHYSICIAN ASSISTANT
Payer: MEDICAID

## 2021-08-21 LAB
ALBUMIN SERPL-MCNC: 2.3 G/DL (ref 3.4–5)
ALP SERPL-CCNC: 67 U/L (ref 40–150)
ALT SERPL W P-5'-P-CCNC: 11 U/L (ref 0–50)
ANION GAP SERPL CALCULATED.3IONS-SCNC: 5 MMOL/L (ref 3–14)
AST SERPL W P-5'-P-CCNC: 20 U/L (ref 0–45)
BILIRUB SERPL-MCNC: 1.1 MG/DL (ref 0.2–1.3)
BUN SERPL-MCNC: 6 MG/DL (ref 7–30)
CALCIUM SERPL-MCNC: 8.2 MG/DL (ref 8.5–10.1)
CHLORIDE BLD-SCNC: 105 MMOL/L (ref 94–109)
CO2 SERPL-SCNC: 27 MMOL/L (ref 20–32)
CREAT SERPL-MCNC: 0.81 MG/DL (ref 0.52–1.04)
ERYTHROCYTE [DISTWIDTH] IN BLOOD BY AUTOMATED COUNT: 15.9 % (ref 10–15)
GFR SERPL CREATININE-BSD FRML MDRD: >90 ML/MIN/1.73M2
GLUCOSE BLD-MCNC: 85 MG/DL (ref 70–99)
HCT VFR BLD AUTO: 27.1 % (ref 35–47)
HGB BLD-MCNC: 8.7 G/DL (ref 11.7–15.7)
INR PPP: 1.61 (ref 0.85–1.15)
LACTATE SERPL-SCNC: 1.7 MMOL/L (ref 0.7–2)
LACTATE SERPL-SCNC: 2.5 MMOL/L (ref 0.7–2)
MCH RBC QN AUTO: 30.5 PG (ref 26.5–33)
MCHC RBC AUTO-ENTMCNC: 32.1 G/DL (ref 31.5–36.5)
MCV RBC AUTO: 95 FL (ref 78–100)
PLATELET # BLD AUTO: 193 10E3/UL (ref 150–450)
POTASSIUM BLD-SCNC: 3.4 MMOL/L (ref 3.4–5.3)
PROT SERPL-MCNC: 6.2 G/DL (ref 6.8–8.8)
RBC # BLD AUTO: 2.85 10E6/UL (ref 3.8–5.2)
SARS-COV-2 RNA RESP QL NAA+PROBE: NEGATIVE
SODIUM SERPL-SCNC: 137 MMOL/L (ref 133–144)
WBC # BLD AUTO: 14.7 10E3/UL (ref 4–11)

## 2021-08-21 PROCEDURE — 74018 RADEX ABDOMEN 1 VIEW: CPT | Mod: 26

## 2021-08-21 PROCEDURE — 36415 COLL VENOUS BLD VENIPUNCTURE: CPT | Performed by: INTERNAL MEDICINE

## 2021-08-21 PROCEDURE — 250N000011 HC RX IP 250 OP 636

## 2021-08-21 PROCEDURE — 250N000011 HC RX IP 250 OP 636: Performed by: PHYSICIAN ASSISTANT

## 2021-08-21 PROCEDURE — 250N000013 HC RX MED GY IP 250 OP 250 PS 637: Performed by: INTERNAL MEDICINE

## 2021-08-21 PROCEDURE — 87640 STAPH A DNA AMP PROBE: CPT | Performed by: PHYSICIAN ASSISTANT

## 2021-08-21 PROCEDURE — 83605 ASSAY OF LACTIC ACID: CPT | Performed by: STUDENT IN AN ORGANIZED HEALTH CARE EDUCATION/TRAINING PROGRAM

## 2021-08-21 PROCEDURE — 250N000013 HC RX MED GY IP 250 OP 250 PS 637: Performed by: STUDENT IN AN ORGANIZED HEALTH CARE EDUCATION/TRAINING PROGRAM

## 2021-08-21 PROCEDURE — 99232 SBSQ HOSP IP/OBS MODERATE 35: CPT | Mod: GC | Performed by: INTERNAL MEDICINE

## 2021-08-21 PROCEDURE — 85610 PROTHROMBIN TIME: CPT | Performed by: INTERNAL MEDICINE

## 2021-08-21 PROCEDURE — 80053 COMPREHEN METABOLIC PANEL: CPT | Performed by: INTERNAL MEDICINE

## 2021-08-21 PROCEDURE — 120N000002 HC R&B MED SURG/OB UMMC

## 2021-08-21 PROCEDURE — 250N000011 HC RX IP 250 OP 636: Performed by: INTERNAL MEDICINE

## 2021-08-21 PROCEDURE — U0003 INFECTIOUS AGENT DETECTION BY NUCLEIC ACID (DNA OR RNA); SEVERE ACUTE RESPIRATORY SYNDROME CORONAVIRUS 2 (SARS-COV-2) (CORONAVIRUS DISEASE [COVID-19]), AMPLIFIED PROBE TECHNIQUE, MAKING USE OF HIGH THROUGHPUT TECHNOLOGIES AS DESCRIBED BY CMS-2020-01-R: HCPCS | Performed by: STUDENT IN AN ORGANIZED HEALTH CARE EDUCATION/TRAINING PROGRAM

## 2021-08-21 PROCEDURE — 36415 COLL VENOUS BLD VENIPUNCTURE: CPT | Performed by: STUDENT IN AN ORGANIZED HEALTH CARE EDUCATION/TRAINING PROGRAM

## 2021-08-21 PROCEDURE — 87641 MR-STAPH DNA AMP PROBE: CPT | Performed by: PHYSICIAN ASSISTANT

## 2021-08-21 PROCEDURE — 83605 ASSAY OF LACTIC ACID: CPT

## 2021-08-21 PROCEDURE — 74018 RADEX ABDOMEN 1 VIEW: CPT

## 2021-08-21 PROCEDURE — P9047 ALBUMIN (HUMAN), 25%, 50ML: HCPCS | Performed by: PHYSICIAN ASSISTANT

## 2021-08-21 PROCEDURE — 85027 COMPLETE CBC AUTOMATED: CPT | Performed by: INTERNAL MEDICINE

## 2021-08-21 PROCEDURE — 36415 COLL VENOUS BLD VENIPUNCTURE: CPT

## 2021-08-21 RX ORDER — OXYCODONE HYDROCHLORIDE 5 MG/1
5 TABLET ORAL EVERY 8 HOURS PRN
Status: DISCONTINUED | OUTPATIENT
Start: 2021-08-21 | End: 2021-08-21

## 2021-08-21 RX ORDER — HYDROMORPHONE HYDROCHLORIDE 1 MG/ML
0.3 INJECTION, SOLUTION INTRAMUSCULAR; INTRAVENOUS; SUBCUTANEOUS
Status: DISCONTINUED | OUTPATIENT
Start: 2021-08-21 | End: 2021-08-22

## 2021-08-21 RX ORDER — CIPROFLOXACIN 500 MG/1
500 TABLET, FILM COATED ORAL EVERY 12 HOURS SCHEDULED
Status: DISCONTINUED | OUTPATIENT
Start: 2021-08-22 | End: 2021-08-21

## 2021-08-21 RX ORDER — GABAPENTIN 100 MG/1
100 CAPSULE ORAL 2 TIMES DAILY
Status: DISCONTINUED | OUTPATIENT
Start: 2021-08-21 | End: 2021-08-23 | Stop reason: HOSPADM

## 2021-08-21 RX ORDER — ALBUMIN (HUMAN) 12.5 G/50ML
100 SOLUTION INTRAVENOUS ONCE
Status: DISCONTINUED | OUTPATIENT
Start: 2021-08-23 | End: 2021-08-23 | Stop reason: HOSPADM

## 2021-08-21 RX ORDER — GABAPENTIN 300 MG/1
300 CAPSULE ORAL AT BEDTIME
Status: DISCONTINUED | OUTPATIENT
Start: 2021-08-21 | End: 2021-08-23 | Stop reason: HOSPADM

## 2021-08-21 RX ORDER — HYDROMORPHONE HYDROCHLORIDE 1 MG/ML
INJECTION, SOLUTION INTRAMUSCULAR; INTRAVENOUS; SUBCUTANEOUS
Status: COMPLETED
Start: 2021-08-21 | End: 2021-08-21

## 2021-08-21 RX ORDER — LIDOCAINE 40 MG/G
CREAM TOPICAL
Status: DISCONTINUED | OUTPATIENT
Start: 2021-08-21 | End: 2021-08-23 | Stop reason: HOSPADM

## 2021-08-21 RX ORDER — ALBUMIN (HUMAN) 12.5 G/50ML
100 SOLUTION INTRAVENOUS ONCE
Status: COMPLETED | OUTPATIENT
Start: 2021-08-21 | End: 2021-08-21

## 2021-08-21 RX ORDER — HYDROXYZINE HYDROCHLORIDE 25 MG/1
25 TABLET, FILM COATED ORAL
Status: DISCONTINUED | OUTPATIENT
Start: 2021-08-21 | End: 2021-08-23 | Stop reason: HOSPADM

## 2021-08-21 RX ORDER — CEFTRIAXONE 2 G/1
2 INJECTION, POWDER, FOR SOLUTION INTRAMUSCULAR; INTRAVENOUS EVERY 24 HOURS
Status: DISCONTINUED | OUTPATIENT
Start: 2021-08-22 | End: 2021-08-22

## 2021-08-21 RX ADMIN — OXYCODONE 5 MG: 5 TABLET ORAL at 06:13

## 2021-08-21 RX ADMIN — DULOXETINE 20 MG: 20 CAPSULE, DELAYED RELEASE ORAL at 08:27

## 2021-08-21 RX ADMIN — ACETAMINOPHEN 650 MG: 325 TABLET, FILM COATED ORAL at 21:13

## 2021-08-21 RX ADMIN — OXYCODONE 5 MG: 5 TABLET ORAL at 00:44

## 2021-08-21 RX ADMIN — FOLIC ACID 1 MG: 1 TABLET ORAL at 08:27

## 2021-08-21 RX ADMIN — CEFTRIAXONE 1 G: 1 INJECTION, POWDER, FOR SOLUTION INTRAMUSCULAR; INTRAVENOUS at 08:27

## 2021-08-21 RX ADMIN — THIAMINE HCL TAB 100 MG 100 MG: 100 TAB at 08:27

## 2021-08-21 RX ADMIN — Medication 2.5 MG: at 19:16

## 2021-08-21 RX ADMIN — SPIRONOLACTONE 100 MG: 100 TABLET ORAL at 08:27

## 2021-08-21 RX ADMIN — PANTOPRAZOLE SODIUM 40 MG: 40 TABLET, DELAYED RELEASE ORAL at 16:00

## 2021-08-21 RX ADMIN — METHOCARBAMOL 500 MG: 500 TABLET ORAL at 10:18

## 2021-08-21 RX ADMIN — GABAPENTIN 100 MG: 100 CAPSULE ORAL at 08:28

## 2021-08-21 RX ADMIN — ALBUMIN HUMAN 100 G: 0.25 SOLUTION INTRAVENOUS at 23:52

## 2021-08-21 RX ADMIN — GABAPENTIN 100 MG: 100 CAPSULE ORAL at 13:35

## 2021-08-21 RX ADMIN — HYDROMORPHONE HYDROCHLORIDE 0.3 MG: 1 INJECTION, SOLUTION INTRAMUSCULAR; INTRAVENOUS; SUBCUTANEOUS at 22:57

## 2021-08-21 RX ADMIN — METHOCARBAMOL 500 MG: 500 TABLET ORAL at 21:13

## 2021-08-21 RX ADMIN — POLYETHYLENE GLYCOL 3350 17 G: 17 POWDER, FOR SOLUTION ORAL at 21:15

## 2021-08-21 RX ADMIN — PANTOPRAZOLE SODIUM 40 MG: 40 TABLET, DELAYED RELEASE ORAL at 08:27

## 2021-08-21 RX ADMIN — Medication 2.5 MG: at 13:38

## 2021-08-21 RX ADMIN — METHOCARBAMOL 500 MG: 500 TABLET ORAL at 16:00

## 2021-08-21 RX ADMIN — GABAPENTIN 300 MG: 300 CAPSULE ORAL at 21:13

## 2021-08-21 RX ADMIN — FUROSEMIDE 40 MG: 40 TABLET ORAL at 08:27

## 2021-08-21 ASSESSMENT — MIFFLIN-ST. JEOR: SCORE: 1621.13

## 2021-08-21 ASSESSMENT — ACTIVITIES OF DAILY LIVING (ADL)
ADLS_ACUITY_SCORE: 16

## 2021-08-21 NOTE — PROGRESS NOTES
[facility]    Progress Note - Maroon 1 Service        Date of Admission:  8/13/2021    Assessment & Plan      Maricruz Lechuga is a 41 year old female with a PMHX significant for acute alcoholic hepatitis s/p steroids (pt did not complete the treatment), decompensated alcoholic cirrhosis with ascites and coagulopathy, morbid obesity, tobacco dependence, presented with abdominal pain and BLE swelling, hematemesis and melena, both resolved, and was found to have low hemoglobin. Pt is stable, no on IV medications, and is pending TCU placement.     Today's Updates  - awaiting TCU placement  - decreasing PO oxycodone to 2.5mg q6h  - scheduled bed time gabapentin  - start PO atarax 25-50 mg PRN at bedtime   - lab holiday tomorrow  - consider outpatient pain referral     Abdominal pain------improving  Left hepatic lobe hypodense lesion  Patient has chronic generalized abdominal pain that has acutely worsened, in addition to sharp RUQ pain that is reproducible on exam. Patient has had multiple admissions over the past month due to abdominal pain. Hospitalized on 6/24/2021 at [facility] in [place] and placed on a 28-day course of prednisolone which patient did not complete. Then admitted to [facility] on 7/8/2021 for ongoing pain and had an ER visit on 7/22/2021 again for ongoing abdominal pain. Previous CT scan in July 2021 showed hepatocellular disease and steatosis with portal hypertension including splenomegaly and recanalization of the umbilical vein also mild to moderate ascites and moderate anasarca. Repeat CT scan here showed the same finding and a hypodense liver lesion involving left hepatic lobe, though second CT on 8/18 here did not demonstrate that lesion well. Her prior hepatitis serologies were negative. Pt was positive for hep C antibody but her PCR was undetectable. Patient has had several paracenteses which did not improve her pain.  Abdominal pain could be 2/2 to cirrhosis.   - patient encouraged to take scheduled non-opiate pain medications and to only use oxycodone as needed  - continue tylenol 650 mg Q8H   - continue methocarbamol 500mg QID  - decreasing PO oxycodone to 2.5 mg q6h PRN  - change gabapentin to 100 mg BID and 300 mg QHS  - continue PO duloxetine  - PT/OT consults, ambulate TID  - outpatient work up for liver lesion, possibly resolved    Decompensated alcoholic cirrhosis c/b ascites  Hx of alcoholic hepatitis (s/p incomplete treatment)   Patient was recently diagnosed with acute alcoholic hepatitis. She was started on steroid that showed improvement however patient did not complete the steroid treatment. Liver labs WNL. No documented hx of esophageal varices. Recent EGD at the OSH showed gastritis. No prior HE however patient was on lactulose PTA. She has had large volume ascites and has undergone paracentesis several times. Her last paracentesis was on 08/02, at that time 4.4 L were removed. No prior hx of SBP, not on SBP prophylaxis PTA.    MELD-Na score: 12 at 8/21/2021  6:37 AM  MELD score: 12 at 8/21/2021  6:37 AM  Calculated from:  Serum Creatinine: 0.81 mg/dL (Using min of 1 mg/dL) at 8/21/2021  6:37 AM  Serum Sodium: 137 mmol/L at 8/21/2021  6:37 AM  Total Bilirubin: 1.1 mg/dL at 8/21/2021  6:37 AM  INR(ratio): 1.61 at 8/21/2021  6:37 AM  Age: 41 years    - paracentesis completed 8/16 and 8/18, no evidence of SBP   - Follow fluid culture 8/18, NGTD  - GI consulted, appreciated recs   - No immediate plan for EGD   - Plan triple phase CT as outpatient   - abx for 5 days (IV ceftriaxone 18-8/21, PO ciprofloxacin 08/22/21)   - s/p albumin 25% 50g on 8/18  - continue lasix 40 mg daily  - continue spironolactone to 100 mg daily  - lactulose once daily, titrate to 3-4 stools daily      Chronic leukocytosis  Could be due to alcoholic hepatitis. Vitally stable on admission. No fever or chills. No localizing s/s of infection aside  from abdominal pain. Infectious work up including BCX, CXR, UA negative. Paracentesis fluid testing 8/13 neg for SBP. Repeat infectious workup 8/18: UA negative, CXR with trace mixed interstitial and streaky opacities likely atelectasis vs atypical infection, CT AP with contrast showing stable chronic liver disease/portal hypertension, moderate ascites and diffuse anasarca but no other changes, repeat paracentesis not concerning for infection, BC NGTD.  -Abx as above  - monitor CBC every other day if no other change in the clinical status     Lactic acidosis  Likely 2/2 intravascular volume depletion in the setting of anasarca and decreased clearance due to liver disease.   - no need to trend lactate    Hematemesis, resolved  Hx of melena, resolved  Patient reported small volume hematemesis. On presentation she was vitally stable then became a little more tachycardic. Repeat CBC showed low Hgb at 7.7 however all 3 cell lines were dropped, could be dilutional vs acute bleeding. No further episodes after admission. She was started on IV octreotide and pantoprazole. As per chart review she had a recent EGD that showed gastritis.   - GI consulted, appreciate recs  - 2g sodium diet  - Continue PO pantoprazole bid    Bilateral lower extremity edema  Patient complained of bilateral lower extremity edema. PTA she was on spironolactone only which was stopped due to BROOKE. She also complained of bilateral lower extremity pain. No DVT on dopplers. No signs of cellulitis. Patient also has ascites and has been frequently getting paracentesis, concerns for anasarca. No known kidney or heart problem or any enteropathy. She has alcoholic liver cirrhosis and albumin is low however other liver labs are normal. TSH wnl. No protein on UA however her urine protein/creatinine ratio was elevated on admission. US abdomen also revealed normal kidneys. Has had good UOP with current diuretic regimen.  - lymphedema consult, appreciate cares  -  continue PO lasix 40mg daily  - continue [id number] daily  - monitor BMP  - I/o  - daily weight     Acute on chronic macrocytic anemia  Patient has borderline macrocytosis. Her baseline hgb appears to be around 8-9. No ongoing bleeding but patient reported small volume hematemesis as above. She is hemodynamically stable. Concerned that another process might be driving the anemia, possibly related to alcohol use though patient denies alcohol use in past year. Folate is low. Vit B12 wnl. Retic count is appropriate. Peripheral smear 8/14 with moderate normochromic/normocytic anemia without hemolysis, slight neutrophilic leukocytosis. LDH normal.   - CBC every other day if no signs of bleeding     Hypokalemia  Replace per protocol     Polysubstance use disorder  Pt was found to have opioids in her urine tox screen. Pt has a hx of alcohol use, however reports her last drink was 1 year ago. Patient reports smoking 1-2 cigarettes.   - patient declines addiction medicine/chemical dependency consults  - nicotine patch not indicated    Hx of Yeast cystitis  - s/p 14 day course of  fluconazole      Diet: 2 Gram Sodium Diet  Snacks/Supplements Adult: Other; Ensure plant base chocolate at 2:00 and at HS daily; Between Meals    DVT Prophylaxis: INR elevated, ambulation   Anderson Catheter: Not present  Fluids: None  Central Lines: None  Code Status: Full Code      Disposition Plan   Expected discharge: medically ready for discharge, recommended to transitional care unit once bed available.     The patient's care was discussed with the Attending Physician, Dr. Leger.    Janice Vega MD  [address]  Securely message with the Vocera Web Console (learn more here)  Text page via [employer] Paging/Directory  Please see sign in/sign out for up to date coverage information    ______________________________________________________________________    Interval History   NAEO.  Maricruz continues to have abdominal pain, however says that her pain has improved over all.  No nausea or vomiting. No other complains.     4 point ROS is otherwise negative.     Data reviewed today: I reviewed all medications, new labs and imaging results over the last 24 hours.     Physical Exam   Vital Signs: Temp: 98.2  F (36.8  C) Temp src: Oral BP: 100/53 Pulse: 97   Resp: 16 SpO2: 96 % O2 Device: None (Room air)    Weight: 227 lbs 1.18 oz     Gen: sleeping but awakens to voice, appears comfortable at rest but grimaces and holds abdomen with movement and exam, similar to prior  HEENT: head atraumatic and normocephalic, hearing grossly normal, pupils equal and round, EOMI, no conjunctival injection or scleral icterus, no nasal drainage, missing multiple upper teeth with very poor dentition, small bleeding laceration to right oral commisure  Neck:  normal ROM  CV: RRR, normal S1 and S2, no murmurs, rubs, or gallops  Pulm: CTAB, no wheezes, rhonchi, or rales. No increased WOB.  Abd: obese, soft, significantly tender to light palpation diffusely, normal bowel sounds throughout.  Ext: pitting edema dependently to level of the thighs with wraps in place to knees bilaterally, normal muscle tone, moves all extremities equally  Skin: warm and dry, no rashes, pallor, cyanosis, jaundice, or bruising.  Neuro: A&Ox3, answers questions appropriately, normal speech, CN II-XII grossly intact.  Psych: normal affect, makes good eye contact    Data   Recent Labs   Lab 08/21/21  0637 08/20/21  0701 08/19/21  0605   WBC 14.7* 14.2* 14.3*   HGB 8.7* 8.4* 8.3*   MCV 95 97 98    178 178   INR 1.61* 1.73* 1.86*    139 137   POTASSIUM 3.4 3.3* 3.4   CHLORIDE 105 106 105   CO2 27 28 26   BUN 6* 5* 4*   CR 0.81 0.77 0.69   ANIONGAP 5 5 6   MELODY 8.2* 8.1* 8.0*   GLC 85 90 83   ALBUMIN 2.3* 2.3* 2.4*   PROTTOTAL 6.2* 6.2* 6.0*   BILITOTAL 1.1 0.9 1.1   ALKPHOS 67 68 64   ALT 11 11 13   AST 20 21 22     No results found for this or  any previous visit (from the past 24 hour(s)).

## 2021-08-21 NOTE — PLAN OF CARE
No acute events this shift. Oriented x4, calm and cooperative with cares. Bilateral Lower ext edema improving and patient diuresing well. Still Complained of abdominal pain and was given oxycodone x1 which offered good relief thereafter. Declined to participate with therapy for 2 days now. She Was seen and rounded by primary team and reduced oxycodone dose to 2.5 mg Q 6 hrs. Rocephin for Abx. Paracentesis site dressing Clean dry and intact.  P: Administer Abx as ordered. Encourage activity out of bed. Awaiting for TCU placement. Continue with plan of care.

## 2021-08-21 NOTE — PROVIDER NOTIFICATION
"Paged Onofre:    \"Pt is requesting  a breakthrough dilaudid for pain.\"    MD called back and stated that to try robaxin and oxycodone first before breakthrough can be ordered.    Joslyn Pope RN on 8/20/2021 at 8:47 PM    "

## 2021-08-21 NOTE — PROGRESS NOTES
Care Management Follow Up    Length of Stay (days): 8    Expected Discharge Date: 08/23/2021     Concerns to be Addressed: discharge planning     Patient plan of care discussed at interdisciplinary rounds: Spoke with the Onofre Mccurdy team.     Anticipated Discharge Disposition: Skilled Nursing Facilty, Transitional Care     Anticipated Discharge Services: None  Anticipated Discharge DME: None    Patient/family educated on Medicare website which has current facility and service quality ratings: yes  Education Provided on the Discharge Plan:    Patient/Family in Agreement with the Plan: yes    Referrals Placed by CM/SW: Post Acute Facilities  Private pay costs discussed: Not applicable    Additional Information:  Per her medical team, Ms. Lechuga is medically ready for discharge. The weekday  has made several TCU/SNF referrals which I contacted this morning.   [facility] [phone]) has received the referral, but needs to discuss her as a team on Monday, due to her complexity. They also currently have no open beds, and do not anticipate any until the end of next week.     [facility] did not answer.     [facility] does not take weekend admissions.     Social work will continue to follow for disposition planning.     WENDY Davis, [facility]     Searchable on [employer]-search SOCIAL WORK - for text paging options    Saturday & Sunday & Holidays  On-Call Pager [phone]    4A 4C 4E 5A 5B [phone]    6A 6B 6C 6D  [phone]    5C 7A 7B 7C 7D [phone]    For Hours 1600-Midnight Pager [phone]

## 2021-08-21 NOTE — PLAN OF CARE
Care continued. Patient triggered sepsis risk alert at the start of the shift with [id number] and elevated WBC. MD notified and protocol initiated.

## 2021-08-21 NOTE — PLAN OF CARE
Pt is AO*4, denies nausea. On RA. Up w/SBA to the commode. No BM overnight, voids adequately. Bruising around paracentesis site. Pt requested dialudid for pain, MD was paged and stated to given robaxin and oxycodone first. Oxycodone x2 given. Pt slept throughout the shift. Follow plan of care.    Joslyn Pope RN on 8/21/2021 at 6:43 AM

## 2021-08-22 ENCOUNTER — APPOINTMENT (OUTPATIENT)
Dept: PHYSICAL THERAPY | Facility: CLINIC | Age: 41
End: 2021-08-22
Payer: MEDICAID

## 2021-08-22 LAB
ALBUMIN SERPL-MCNC: 2.5 G/DL (ref 3.4–5)
ALBUMIN UR-MCNC: NEGATIVE MG/DL
ALP SERPL-CCNC: 66 U/L (ref 40–150)
ALT SERPL W P-5'-P-CCNC: 12 U/L (ref 0–50)
ANION GAP SERPL CALCULATED.3IONS-SCNC: 4 MMOL/L (ref 3–14)
APPEARANCE UR: CLEAR
AST SERPL W P-5'-P-CCNC: 20 U/L (ref 0–45)
BASOPHILS # BLD AUTO: 0.1 10E3/UL (ref 0–0.2)
BASOPHILS NFR BLD AUTO: 1 %
BILIRUB SERPL-MCNC: 0.8 MG/DL (ref 0.2–1.3)
BILIRUB UR QL STRIP: NEGATIVE
BUN SERPL-MCNC: 8 MG/DL (ref 7–30)
CALCIUM SERPL-MCNC: 7.9 MG/DL (ref 8.5–10.1)
CHLORIDE BLD-SCNC: 106 MMOL/L (ref 94–109)
CO2 SERPL-SCNC: 28 MMOL/L (ref 20–32)
COLOR UR AUTO: YELLOW
CREAT SERPL-MCNC: 0.85 MG/DL (ref 0.52–1.04)
CRP SERPL-MCNC: 16 MG/L (ref 0–8)
EOSINOPHIL # BLD AUTO: 0.5 10E3/UL (ref 0–0.7)
EOSINOPHIL NFR BLD AUTO: 3 %
ERYTHROCYTE [DISTWIDTH] IN BLOOD BY AUTOMATED COUNT: 15.9 % (ref 10–15)
GFR SERPL CREATININE-BSD FRML MDRD: 86 ML/MIN/1.73M2
GLUCOSE BLD-MCNC: 126 MG/DL (ref 70–99)
GLUCOSE UR STRIP-MCNC: NEGATIVE MG/DL
HCT VFR BLD AUTO: 24.7 % (ref 35–47)
HGB BLD-MCNC: 8 G/DL (ref 11.7–15.7)
HGB UR QL STRIP: NEGATIVE
HYALINE CASTS: 1 /LPF
IMM GRANULOCYTES # BLD: 0.1 10E3/UL
IMM GRANULOCYTES NFR BLD: 1 %
KETONES UR STRIP-MCNC: NEGATIVE MG/DL
LACTATE SERPL-SCNC: 1.8 MMOL/L (ref 0.7–2)
LEUKOCYTE ESTERASE UR QL STRIP: NEGATIVE
LYMPHOCYTES # BLD AUTO: 2.9 10E3/UL (ref 0.8–5.3)
LYMPHOCYTES NFR BLD AUTO: 19 %
MCH RBC QN AUTO: 30.8 PG (ref 26.5–33)
MCHC RBC AUTO-ENTMCNC: 32.4 G/DL (ref 31.5–36.5)
MCV RBC AUTO: 95 FL (ref 78–100)
MONOCYTES # BLD AUTO: 1.1 10E3/UL (ref 0–1.3)
MONOCYTES NFR BLD AUTO: 7 %
MRSA DNA SPEC QL NAA+PROBE: NEGATIVE
MUCOUS THREADS #/AREA URNS LPF: PRESENT /LPF
NEUTROPHILS # BLD AUTO: 11 10E3/UL (ref 1.6–8.3)
NEUTROPHILS NFR BLD AUTO: 69 %
NITRATE UR QL: NEGATIVE
NRBC # BLD AUTO: 0 10E3/UL
NRBC BLD AUTO-RTO: 0 /100
PH UR STRIP: 6.5 [PH] (ref 5–7)
PLATELET # BLD AUTO: 173 10E3/UL (ref 150–450)
POTASSIUM BLD-SCNC: 3.4 MMOL/L (ref 3.4–5.3)
PROCALCITONIN SERPL-MCNC: <0.05 NG/ML
PROT SERPL-MCNC: 6.2 G/DL (ref 6.8–8.8)
RBC # BLD AUTO: 2.6 10E6/UL (ref 3.8–5.2)
RBC URINE: 0 /HPF
SA TARGET DNA: NEGATIVE
SODIUM SERPL-SCNC: 138 MMOL/L (ref 133–144)
SP GR UR STRIP: 1.02 (ref 1–1.03)
SQUAMOUS EPITHELIAL: 4 /HPF
UROBILINOGEN UR STRIP-MCNC: NORMAL MG/DL
WBC # BLD AUTO: 15.6 10E3/UL (ref 4–11)
WBC URINE: 1 /HPF

## 2021-08-22 PROCEDURE — 85025 COMPLETE CBC W/AUTO DIFF WBC: CPT | Performed by: PHYSICIAN ASSISTANT

## 2021-08-22 PROCEDURE — 250N000013 HC RX MED GY IP 250 OP 250 PS 637: Performed by: INTERNAL MEDICINE

## 2021-08-22 PROCEDURE — 36415 COLL VENOUS BLD VENIPUNCTURE: CPT | Performed by: PHYSICIAN ASSISTANT

## 2021-08-22 PROCEDURE — 81001 URINALYSIS AUTO W/SCOPE: CPT | Performed by: PHYSICIAN ASSISTANT

## 2021-08-22 PROCEDURE — 250N000013 HC RX MED GY IP 250 OP 250 PS 637: Performed by: STUDENT IN AN ORGANIZED HEALTH CARE EDUCATION/TRAINING PROGRAM

## 2021-08-22 PROCEDURE — 83605 ASSAY OF LACTIC ACID: CPT | Performed by: PHYSICIAN ASSISTANT

## 2021-08-22 PROCEDURE — 86140 C-REACTIVE PROTEIN: CPT | Performed by: PHYSICIAN ASSISTANT

## 2021-08-22 PROCEDURE — 80053 COMPREHEN METABOLIC PANEL: CPT | Performed by: PHYSICIAN ASSISTANT

## 2021-08-22 PROCEDURE — 84145 PROCALCITONIN (PCT): CPT | Performed by: PHYSICIAN ASSISTANT

## 2021-08-22 PROCEDURE — 250N000013 HC RX MED GY IP 250 OP 250 PS 637

## 2021-08-22 PROCEDURE — 87040 BLOOD CULTURE FOR BACTERIA: CPT | Performed by: PHYSICIAN ASSISTANT

## 2021-08-22 PROCEDURE — 99232 SBSQ HOSP IP/OBS MODERATE 35: CPT | Mod: GC | Performed by: INTERNAL MEDICINE

## 2021-08-22 PROCEDURE — 97530 THERAPEUTIC ACTIVITIES: CPT | Mod: GP

## 2021-08-22 PROCEDURE — 120N000002 HC R&B MED SURG/OB UMMC

## 2021-08-22 RX ORDER — CIPROFLOXACIN 500 MG/1
500 TABLET, FILM COATED ORAL EVERY 12 HOURS SCHEDULED
Status: DISCONTINUED | OUTPATIENT
Start: 2021-08-22 | End: 2021-08-23 | Stop reason: HOSPADM

## 2021-08-22 RX ADMIN — THIAMINE HCL TAB 100 MG 100 MG: 100 TAB at 08:30

## 2021-08-22 RX ADMIN — CIPROFLOXACIN HYDROCHLORIDE 500 MG: 500 TABLET, FILM COATED ORAL at 08:31

## 2021-08-22 RX ADMIN — GABAPENTIN 300 MG: 300 CAPSULE ORAL at 21:37

## 2021-08-22 RX ADMIN — DULOXETINE 20 MG: 20 CAPSULE, DELAYED RELEASE ORAL at 08:31

## 2021-08-22 RX ADMIN — METHOCARBAMOL 500 MG: 500 TABLET ORAL at 05:29

## 2021-08-22 RX ADMIN — CIPROFLOXACIN HYDROCHLORIDE 500 MG: 500 TABLET, FILM COATED ORAL at 20:29

## 2021-08-22 RX ADMIN — METHOCARBAMOL 500 MG: 500 TABLET ORAL at 15:53

## 2021-08-22 RX ADMIN — GABAPENTIN 100 MG: 100 CAPSULE ORAL at 08:31

## 2021-08-22 RX ADMIN — GABAPENTIN 100 MG: 100 CAPSULE ORAL at 13:20

## 2021-08-22 RX ADMIN — ACETAMINOPHEN 650 MG: 325 TABLET, FILM COATED ORAL at 05:29

## 2021-08-22 RX ADMIN — Medication 2.5 MG: at 20:38

## 2021-08-22 RX ADMIN — SPIRONOLACTONE 100 MG: 100 TABLET ORAL at 08:31

## 2021-08-22 RX ADMIN — Medication 2.5 MG: at 15:06

## 2021-08-22 RX ADMIN — METHOCARBAMOL 500 MG: 500 TABLET ORAL at 11:04

## 2021-08-22 RX ADMIN — PANTOPRAZOLE SODIUM 40 MG: 40 TABLET, DELAYED RELEASE ORAL at 08:31

## 2021-08-22 RX ADMIN — Medication 2.5 MG: at 08:39

## 2021-08-22 RX ADMIN — METHOCARBAMOL 500 MG: 500 TABLET ORAL at 21:37

## 2021-08-22 RX ADMIN — FOLIC ACID 1 MG: 1 TABLET ORAL at 08:31

## 2021-08-22 RX ADMIN — FUROSEMIDE 40 MG: 40 TABLET ORAL at 08:31

## 2021-08-22 RX ADMIN — PANTOPRAZOLE SODIUM 40 MG: 40 TABLET, DELAYED RELEASE ORAL at 15:53

## 2021-08-22 ASSESSMENT — ACTIVITIES OF DAILY LIVING (ADL)
ADLS_ACUITY_SCORE: 16

## 2021-08-22 ASSESSMENT — MIFFLIN-ST. JEOR: SCORE: 1634.78

## 2021-08-22 NOTE — PROVIDER NOTIFICATION
08/21/21 2300   Call Information   Date of Call 08/21/21   Time of Call 2224   Name of person requesting the team Kim   Title of person requesting team RN   RRT Arrival time 2227   Time RRT ended 2250   Reason for call   Type of RRT Adult   Primary reason for call Sepsis suspected   Sepsis Suspected Elevated Lactate level;RR > 20, SaO2 <90% OR increasing O2 need;WBC <4 or >12;Pain presence and interventions   Was patient transferred from the ED, ICU, or PACU within last 24 hours prior to RRT call? No   SBAR   Situation Lactic Acid = 2.5   Background a 41 year old female admitted on 8/13/2021. She has a history of alcoholic cirrhosis with ascites, morbid obesity, tobacco dependence, and prior alcohol abuse and was admitted for reported hematemesis in the setting of decompensated alcoholic liver cirrhosis, with concern for possible variceal bleed, in addition to chronic ongoing abdominal pain and BLE swelling.                                   Notable History/Conditions End-Stage disease;Hypertension   Assessment A+O x4. VSS; had sudden abdominal pain in spite of receiving pain meds earlier.     Interventions IV fluids;Labs;Meds  (Albumin and Cipro)   Patient Outcome   Patient Outcome Stabilized on unit   RRT Team   Attending/Primary/Covering Physician Maramie 1   Date Attending Physician notified 08/21/21   Time Attending Physician notified 2224   Physician(s) Julita Mcfadden PA-C   Lead RN Ramona Jefferson RN   RN Kim Parisi RN   Post RRT Intervention Assessment   Post RRT Assessment Stable/Improved   Date Follow Up Done 08/22/21   Time Follow Up Done 0130   Comments Repeat Lactic Acid = 1.8

## 2021-08-22 NOTE — PROGRESS NOTES
Patient triggered code sepsis with lactate of 2.5, acute change in abdominal pain and rigors. Patient s/p treatment with ceftriaxone, plan was for patient to be switched to ciprofloxacin this morning. D/c'd order for ceftriaxone and continuing with ciprofloxacin. Patient is currently hemodynamically stable. Albumin on hold.

## 2021-08-22 NOTE — PLAN OF CARE
A:  patient resting after sepsis code earlier.  Albumin held and IV rocephin held and due for PO Cipro this morning.  Dilaudid given for abdominal pain and no complaints of pain since.  MRSA collected and returned negative.  Recheck Lactic acid 1.8. WBC continues high at 15.6.   R:  continue to monitor and treat per plan of care.

## 2021-08-22 NOTE — CODE/RAPID RESPONSE
Rapid Response Team Note    Assessment   In assessment a rapid response was called on Maricruz Lechuga due to Hyperlactatemia w/ LA 2.5 in setting of decompensated etoh cirrhosis. Patient reports that she developed acute change in abdominal pain this evening. Pain is reported as sharp, diffuse, and non-radiating w/ associated rigors. VSS. No melena, hematemesis, hematochezia, nausea or vomiting, CP, sob, cough, dysuria noted.     Plan   -  Stat AXR to eval for acute pathology  - Ceftriaxone 2g IV q24h for suspected SBP  - 100g 25% albumin x1 and additional dose ordered for 8/23/21   - CBC, CMP, UA, BCx2, procalcitonin, CRP  - Repeat LA at 0100  -  The Internal Medicine (Onofre whitehead) primary team was able to be reached and they are in agreement with the above plan.  -  Disposition: The patient will remain on the current unit. We will continue to monitor this patient closely.  -  Reassessment and plan follow-up will be performed by the primary team      Julita Mcfadden PA-C  [facility] RRT AMCOM Job Code Contact #[phone]    Hospital Course   Brief Summary of events leading to rapid response:   Maricruz Lechuga is a 41 year old female with a PMHX significant for acute etoh hepatitis s/p steroids (pt did not complete the treatment), decompensated etoh cirrhosis c/b ascites and coagulopathy, morbid obesity, tobacco dependence, presented with abdominal pain and BLE swelling, hematemesis and melena, both resolved, and was found to have low hemoglobin. Pt is stable, no on IV medications, and is pending TCU placement. RRT called this evening for Hyperlactatemia w/ LA 2.5. w/u as above.     Admission Diagnosis:   Abdominal pain, generalized [R10.84]  Alcoholic cirrhosis of liver with ascites (H) [K70.31]  Bilateral lower extremity edema [R60.0]  Gastrointestinal hemorrhage with hematemesis [K92.0]     Physical Exam   Temp: 97.8  F (36.6  C) Temp  Min: 97.6  F (36.4  C)  Max: 98.2  F (36.8  C)  Resp: 16 Resp  Min: 16  Max:  17  SpO2: 98 % SpO2  Min: 96 %  Max: 98 %  Pulse: 98 Pulse  Min: 95  Max: 109    No data recorded  BP: 106/58 Systolic (24hrs), Av , Min:91 , Max:106   Diastolic (24hrs), Av, Min:47, Max:58     I/Os: I/O last 3 completed shifts:  In: 120 [P.O.:120]  Out: 1850 [Urine:1400; Other:450]     Exam:   General: Chroncally ill-appearing female lying in bed. Appears mildly distressed. Conversant.   Mental Status: baseline mental status.  CV: RRR, no appreciable m/r/g  Resp: breathing non-labored on RA  GI: Hyperactive bowel sounds. Diffuse abdominal tenderness.   Skin: Jaundice    Significant Results and Procedures   Lactic Acid:   Recent Labs   Lab Test 21  2106 21  1621 21  1447   LACT 2.5* 1.7 1.0     CBC:   Recent Labs   Lab Test 21  0637 21  0701 21  0605   WBC 14.7* 14.2* 14.3*   HGB 8.7* 8.4* 8.3*   HCT 27.1* 26.2* 26.2*    178 178        Sepsis Evaluation   The patient is not known to have an infection.  Maricruz Lechuga meets SIRS criteria AND has a lactate >2 or other evidence of acute organ damage.  These vital signs, lab and physical exam findings constitute a diagnosis of SEVERE SEPSIS.    Sepsis Time-Zero (time severe sepsis diagnosis confirmed):   21 as this was the time when Lactate resulted, and the level was > 2.0     Anti-infectives (From now, onward)    Start     Dose/Rate Route Frequency Ordered Stop    21 2300  cefTRIAXone (ROCEPHIN) 2 g vial to attach to  ml bag for ADULTS or NS 50 ml bag for PEDS      2 g  over 30 Minutes Intravenous EVERY 24 HOURS 21 2239          Current antibiotic coverage is appropriate for source of infection.    3 Hour Severe Sepsis Bundle Completion:  1. Initial Lactic Acid result shown above. Repeat lactic acid 0100   2. Blood Cultures before Antibiotics: Yes  3. Broad Spectrum Antibiotics Administered: yes  4. Fluids: 100g 25% albumin orderd mL fluids ORDERED to be given

## 2021-08-22 NOTE — PROGRESS NOTES
[facility]    Progress Note - [facility] 1 Service        Date of Admission:  8/13/2021    Assessment & Plan      Maricruz Lechuga is a 41 year old female with a PMHX significant for acute alcoholic hepatitis s/p steroids (pt did not complete the treatment), decompensated alcoholic cirrhosis with ascites and coagulopathy, morbid obesity, tobacco dependence, presented with abdominal pain and BLE swelling, hematemesis and melena, both resolved, and was found to have low hemoglobin. Pt is stable, no on IV medications, and is pending TCU placement.     Today's Updates  - medically ready for discharge, awaiting TCU placement  - continue PO oxycodone 2.5mg q6h PRN, discontinue IV dilaudid PRN started overnight  - triggered sepsis protocol last night, workup negative, no concerns for infection  - repeat labs (CBC, CMP, INR) every other day  - consider outpatient pain referral     Abdominal pain, improving  Left hepatic lobe hypodense lesion  Patient has chronic generalized abdominal pain, worse in RUQ. Patient has had multiple admissions over the past month due to abdominal pain. Hospitalized on 6/24/2021 at [facility] in [place] and placed on a 28-day course of prednisolone which patient did not complete. Then admitted to [facility] on 7/8/2021 for ongoing pain and had an ER visit on 7/22/2021 again for ongoing abdominal pain. Previous CT scan in July 2021 showed hepatocellular disease and steatosis with portal hypertension including splenomegaly and recanalization of the umbilical vein also mild to moderate ascites and moderate anasarca. Repeat CT scan here showed the same finding and a hypodense liver lesion involving left hepatic lobe, though second CT on 8/18 here did not demonstrate that lesion well. Her prior hepatitis serologies were negative. Pt was positive for hep C antibody but her PCR was undetectable. Patient has had several paracenteses  which did not improve her pain. Abdominal pain could be 2/2 to cirrhosis.   - patient encouraged to take scheduled non-opiate pain medications and to only use oxycodone as needed  - continue tylenol 650 mg Q8H   - continue methocarbamol 500mg QID  - continue PO oxycodone 2.5 mg q6h PRN  - continue gabapentin 100 mg BID and 300 mg QHS  - continue PO duloxetine  - PT/OT consults, ambulate TID  - outpatient work up for liver lesion, possibly resolved    Decompensated alcoholic cirrhosis c/b ascites  Hx of alcoholic hepatitis (s/p incomplete treatment)   Patient was recently diagnosed with acute alcoholic hepatitis. She was started on steroid that showed improvement however patient did not complete the steroid treatment. Liver labs WNL. No documented hx of esophageal varices. Recent EGD at the OSH showed gastritis. No prior HE however patient was on lactulose PTA. She has had large volume ascites and has undergone paracentesis several times. Her last paracentesis was on 08/02, at that time 4.4 L were removed. No prior hx of SBP, not on SBP prophylaxis PTA.    MELD-Na score: 12 at 8/22/2021  1:27 AM  MELD score: 12 at 8/22/2021  1:27 AM  Calculated from:  Serum Creatinine: 0.85 mg/dL (Using min of 1 mg/dL) at 8/22/2021  1:27 AM  Serum Sodium: 138 mmol/L (Using max of 137 mmol/L) at 8/22/2021  1:27 AM  Total Bilirubin: 0.8 mg/dL (Using min of 1 mg/dL) at 8/22/2021  1:27 AM  INR(ratio): 1.61 at 8/21/2021  6:37 AM  Age: 41 years    - paracentesis completed 8/16 and 8/18, no evidence of SBP   - Fluid culture 8/18 no growth x3 days  - GI consulted, appreciated recs   - No immediate plan for EGD   - Plan triple phase CT as outpatient   - abx for 5 days (IV ceftriaxone 18-8/21, PO ciprofloxacin 08/22/21)   - s/p albumin 25% 50g on 8/18  - continue lasix 40 mg daily  - continue spironolactone to 100 mg daily  - lactulose once daily, titrate to 3-4 stools daily    Chronic leukocytosis  Could be due to alcoholic hepatitis. No  fever or chills, no localizing s/s of infection aside from abdominal pain. Multiple infectious workups this admission negative, including UA, CXR, and BCx; 8/22 repeat workup with elevated WBC, lactate, CRP but normal procal and lactic acidosis resolved, most likely related to liver disease rather than infection. CT AP with contrast 8/18 showing stable chronic liver disease/portal hypertension, moderate ascites and diffuse anasarca but no other changes. Paracentesis fluid testing 8/13 and 8/18 neg for SBP.  - Abx as above  - BCx 8/21 monitor, NGTD  - monitor CBC every other day if no other change in the clinical status     Lactic acidosis  Likely 2/2 intravascular volume depletion in the setting of anasarca and decreased clearance due to liver disease. See discussion above.  - no need to trend lactate    Hematemesis, resolved  Hx of melena, resolved  Patient reported small volume hematemesis. On presentation she was vitally stable then became a little more tachycardic. Repeat CBC showed low Hgb at 7.7 however all 3 cell lines were dropped, could be dilutional vs acute bleeding. No further episodes after admission. She was started on IV octreotide and pantoprazole. As per chart review she had a recent EGD that showed gastritis.   - GI consulted, appreciate recs  - 2g sodium diet  - Continue PO pantoprazole bid    Bilateral lower extremity edema  Patient complained of bilateral lower extremity edema. PTA she was on spironolactone only which was stopped due to BROOKE. She also complained of bilateral lower extremity pain. No DVT on dopplers. No signs of cellulitis. Patient also has ascites and has been frequently getting paracentesis, concerns for anasarca. No known kidney or heart problem or any enteropathy. She has alcoholic liver cirrhosis and albumin is low however other liver labs are normal. TSH wnl. No protein on UA however her urine protein/creatinine ratio was elevated on admission. US abdomen also revealed  normal kidneys. Has had good UOP with current diuretic regimen.  - lymphedema consult, appreciate cares  - continue PO lasix 40mg daily  - continue [id number] daily  - monitor BMP every other day  - monitor I/O, daily weight     Malnutrition  - Nutrition consult, appreciate recommendations   - Level of malnutrition: Severe    - Based on: moderate/severe muscle loss, moderate/severe fluid retention    Acute on chronic macrocytic anemia  Patient has borderline macrocytosis. Her baseline hgb appears to be around 8-9. No ongoing bleeding but patient reported small volume hematemesis as above. She is hemodynamically stable. Concerned that another process might be driving the anemia, possibly related to alcohol use though patient denies alcohol use in past year. Folate is low. Vit B12 wnl. Retic count is appropriate. Peripheral smear 8/14 with moderate normochromic/normocytic anemia without hemolysis, slight neutrophilic leukocytosis. LDH normal.   - CBC every other day if no signs of bleeding     Hypokalemia  Replace per protocol     Polysubstance use disorder  Pt was found to have opioids in her urine tox screen. Pt has a hx of alcohol use, however reports her last drink was 1 year ago. Patient reports smoking 1-2 cigarettes.   - patient declines addiction medicine/chemical dependency consults  - nicotine patch not indicated    Hx of Yeast cystitis  - s/p 14 day course of  fluconazole      Diet: 2 Gram Sodium Diet  Snacks/Supplements Adult: Other; Ensure plant base chocolate at 2:00 and at HS daily; Between Meals    DVT Prophylaxis: INR elevated, ambulation   Anderson Catheter: Not present  Fluids: None  Central Lines: None  Code Status: Full Code      Disposition Plan   Expected discharge: medically ready for discharge, recommended to transitional care unit once bed available.     The patient's care was discussed with the Attending Physician, Dr. Leger.    Estephania Stern MD  [address]  [facility]  Securely message with the [employer] Web Console (learn more here)  Text page via [facility] Paging/Directory  Please see sign in/sign out for up to date coverage information    ______________________________________________________________________    Interval History   Triggered sepsis protocol, workup so far not concerning for infection with blood culture pending. Maricruz has ongoing abdominal pain, unchanged from previous. She received a dose of IV dilaudid last night during the sepsis protocol, otherwise used oxycodone 5mg x2 and 2.5mg x2 yesterday, going longer than the 6 hours PRN limit between doses. She has been getting up and walking around more, though did decline PT the past couple of days. She was encouraged to do PT and walk as much as possible to maintain her strength. No other concerns or complaints.    Data reviewed today: I reviewed all medications, new labs and imaging results over the last 24 hours.     Physical Exam   Vital Signs: Temp: (!) 95.6  F (35.3  C) Temp src: Axillary BP: 110/59 Pulse: 94   Resp: 16 SpO2: 95 % O2 Device: None (Room air)    Weight: 216 lbs 7.87 oz     Gen: sleeping but awakens to voice, appears comfortable at rest laying in bed  HEENT: head atraumatic and normocephalic, hearing grossly normal, pupils equal and round, EOMI, no conjunctival injection or scleral icterus, no nasal drainage, missing multiple upper teeth with very poor dentition, small scabbed area to right oral commisure  Neck: normal ROM  CV: RRR, normal S1 and S2, no murmurs, rubs, or gallops  Pulm: transmitted upper airway sounds o/w CTAB, no wheezes, rhonchi, or rales. No increased WOB.  Abd: obese, soft, no tenderness to deep palpation with stethoscope, but significantly tender to light palpation with hand diffusely, normal bowel sounds throughout.  Ext: pitting edema dependently to level of the thighs, no wraps in place this morning, normal muscle tone, moves all extremities  equally  Skin: warm and dry, no rashes, pallor, cyanosis, jaundice, or bruising.  Neuro: A&Ox3, answers questions appropriately, normal speech, CN II-XII grossly intact.  Psych: normal affect, makes good eye contact    Data   Recent Labs   Lab 08/22/21  0127 08/21/21  0637 08/20/21  0701 08/19/21  0605   WBC 15.6* 14.7* 14.2* 14.3*   HGB 8.0* 8.7* 8.4* 8.3*   MCV 95 95 97 98    193 178 178   INR  --  1.61* 1.73* 1.86*    137 139 137   POTASSIUM 3.4 3.4 3.3* 3.4   CHLORIDE 106 105 106 105   CO2 28 27 28 26   BUN 8 6* 5* 4*   CR 0.85 0.81 0.77 0.69   ANIONGAP 4 5 5 6   MELODY 7.9* 8.2* 8.1* 8.0*   * 85 90 83   ALBUMIN 2.5* 2.3* 2.3* 2.4*   PROTTOTAL 6.2* 6.2* 6.2* 6.0*   BILITOTAL 0.8 1.1 0.9 1.1   ALKPHOS 66 67 68 64   ALT 12 11 11 13   AST 20 20 21 22     Recent Results (from the past 24 hour(s))   XR Abdomen Port 1 View    Narrative    XR ABDOMEN PORT 1 VIEWS  8/21/2021 11:32 PM      HISTORY: diffuse abdominal pain. Please eval for acute pathology    COMPARISON: Abdomen and pelvis CT 8/18/2021    FINDINGS: AP supine views of the abdomen. Nonobstructive bowel gas  pattern. No free air or definite pneumatosis. Mild colonic stool. Lung  bases are relatively clear.      Impression    IMPRESSION: Nonobstructive bowel gas pattern.    I have personally reviewed the examination and initial interpretation  and I agree with the findings.    NELLI LARSEN MD         SYSTEM ID:  [id number]

## 2021-08-22 NOTE — PROVIDER NOTIFICATION
Lactic acid 2.5.  Code sepsis called.. Onofre team paged.  Call returned times two.  Wish to keep on cipro only as LA has been in the 2's before. Hold ceftriaxone.

## 2021-08-22 NOTE — PLAN OF CARE
Patient lying in bed for most of the shift, appeared tired an weak but easily be awaken and verbally responsive. Oriented x4, calm and cooperative with cares. Bilateral Lower ext still edematous. Good urine output and no BM today. Complained of abdominal pain and was given oxycodone which offered good relief thereafter. Participated with therapy and walked in hallway with SBA, steady gait and good balance. Cipro  for Abx. Paracentesis site dressing Clean dry and intact.  P: Administer Abx as ordered. Continue with plan of care. Medically stable to discharge, awaiting for TCU placement.

## 2021-08-23 ENCOUNTER — TRANSCRIBE ORDERS (OUTPATIENT)
Dept: OTHER | Age: 41
End: 2021-08-23

## 2021-08-23 ENCOUNTER — APPOINTMENT (OUTPATIENT)
Dept: OCCUPATIONAL THERAPY | Facility: CLINIC | Age: 41
End: 2021-08-23
Payer: MEDICAID

## 2021-08-23 VITALS
OXYGEN SATURATION: 97 % | HEIGHT: 63 IN | SYSTOLIC BLOOD PRESSURE: 100 MMHG | DIASTOLIC BLOOD PRESSURE: 53 MMHG | HEART RATE: 104 BPM | RESPIRATION RATE: 18 BRPM | WEIGHT: 219.5 LBS | BODY MASS INDEX: 38.89 KG/M2 | TEMPERATURE: 97.6 F

## 2021-08-23 DIAGNOSIS — R10.84 ABDOMINAL PAIN, GENERALIZED: Primary | ICD-10-CM

## 2021-08-23 LAB
BACTERIA BLD CULT: NO GROWTH
BACTERIA FLD CULT: NO GROWTH

## 2021-08-23 PROCEDURE — 250N000013 HC RX MED GY IP 250 OP 250 PS 637: Performed by: STUDENT IN AN ORGANIZED HEALTH CARE EDUCATION/TRAINING PROGRAM

## 2021-08-23 PROCEDURE — 99239 HOSP IP/OBS DSCHRG MGMT >30: CPT | Mod: GC | Performed by: INTERNAL MEDICINE

## 2021-08-23 PROCEDURE — 250N000013 HC RX MED GY IP 250 OP 250 PS 637: Performed by: INTERNAL MEDICINE

## 2021-08-23 PROCEDURE — 250N000013 HC RX MED GY IP 250 OP 250 PS 637

## 2021-08-23 PROCEDURE — 97535 SELF CARE MNGMENT TRAINING: CPT | Mod: GO

## 2021-08-23 RX ORDER — METHOCARBAMOL 500 MG/1
500 TABLET, FILM COATED ORAL EVERY 6 HOURS
Qty: 10 TABLET | Refills: 0 | Status: SHIPPED | OUTPATIENT
Start: 2021-08-23

## 2021-08-23 RX ORDER — GABAPENTIN 100 MG/1
100 CAPSULE ORAL 2 TIMES DAILY
Qty: 60 CAPSULE | Refills: 0 | Status: SHIPPED | OUTPATIENT
Start: 2021-08-23

## 2021-08-23 RX ORDER — DULOXETIN HYDROCHLORIDE 20 MG/1
20 CAPSULE, DELAYED RELEASE ORAL DAILY
Qty: 30 CAPSULE | Refills: 0 | Status: SHIPPED | OUTPATIENT
Start: 2021-08-24

## 2021-08-23 RX ORDER — GABAPENTIN 300 MG/1
300 CAPSULE ORAL AT BEDTIME
Qty: 30 CAPSULE | Refills: 0 | Status: SHIPPED | OUTPATIENT
Start: 2021-08-23

## 2021-08-23 RX ORDER — SPIRONOLACTONE 100 MG/1
100 TABLET, FILM COATED ORAL DAILY
Qty: 30 TABLET | Refills: 0 | Status: SHIPPED | OUTPATIENT
Start: 2021-08-24

## 2021-08-23 RX ORDER — OXYCODONE HYDROCHLORIDE 5 MG/1
2.5 TABLET ORAL EVERY 6 HOURS PRN
Qty: 6 TABLET | Refills: 0 | Status: SHIPPED | OUTPATIENT
Start: 2021-08-23

## 2021-08-23 RX ORDER — LANOLIN ALCOHOL/MO/W.PET/CERES
100 CREAM (GRAM) TOPICAL DAILY
Qty: 30 TABLET | Refills: 0 | Status: SHIPPED | OUTPATIENT
Start: 2021-08-24

## 2021-08-23 RX ORDER — FUROSEMIDE 40 MG
40 TABLET ORAL DAILY
Qty: 30 TABLET | Refills: 0 | Status: SHIPPED | OUTPATIENT
Start: 2021-08-24

## 2021-08-23 RX ORDER — FOLIC ACID 1 MG/1
1 TABLET ORAL DAILY
Qty: 30 TABLET | Refills: 0 | Status: SHIPPED | OUTPATIENT
Start: 2021-08-24

## 2021-08-23 RX ADMIN — Medication 2.5 MG: at 10:15

## 2021-08-23 RX ADMIN — FOLIC ACID 1 MG: 1 TABLET ORAL at 09:15

## 2021-08-23 RX ADMIN — GABAPENTIN 100 MG: 100 CAPSULE ORAL at 09:14

## 2021-08-23 RX ADMIN — METHOCARBAMOL 500 MG: 500 TABLET ORAL at 03:41

## 2021-08-23 RX ADMIN — ACETAMINOPHEN 650 MG: 325 TABLET, FILM COATED ORAL at 02:25

## 2021-08-23 RX ADMIN — CIPROFLOXACIN HYDROCHLORIDE 500 MG: 500 TABLET, FILM COATED ORAL at 09:14

## 2021-08-23 RX ADMIN — METHOCARBAMOL 500 MG: 500 TABLET ORAL at 09:15

## 2021-08-23 RX ADMIN — POLYETHYLENE GLYCOL 3350 17 G: 17 POWDER, FOR SOLUTION ORAL at 09:17

## 2021-08-23 RX ADMIN — ACETAMINOPHEN 650 MG: 325 TABLET, FILM COATED ORAL at 09:14

## 2021-08-23 RX ADMIN — PANTOPRAZOLE SODIUM 40 MG: 40 TABLET, DELAYED RELEASE ORAL at 09:15

## 2021-08-23 RX ADMIN — SPIRONOLACTONE 100 MG: 100 TABLET ORAL at 09:14

## 2021-08-23 RX ADMIN — THIAMINE HCL TAB 100 MG 100 MG: 100 TAB at 09:15

## 2021-08-23 RX ADMIN — LACTULOSE 20 G: 10 POWDER, FOR SOLUTION ORAL at 09:16

## 2021-08-23 RX ADMIN — DULOXETINE 20 MG: 20 CAPSULE, DELAYED RELEASE ORAL at 09:15

## 2021-08-23 RX ADMIN — FUROSEMIDE 40 MG: 40 TABLET ORAL at 09:15

## 2021-08-23 RX ADMIN — DICLOFENAC SODIUM 2 G: 10 GEL TOPICAL at 09:17

## 2021-08-23 RX ADMIN — Medication 2.5 MG: at 03:41

## 2021-08-23 ASSESSMENT — ACTIVITIES OF DAILY LIVING (ADL)
ADLS_ACUITY_SCORE: 16

## 2021-08-23 NOTE — PLAN OF CARE
Physical Therapy Discharge Summary    Reason for therapy discharge:    Discharged to recommendation to home with assist and HHPT, however per chart pt declining therapy    Progress towards therapy goal(s). See goals on Care Plan in Psychiatric electronic health record for goal details.  Goals partially met.  Barriers to achieving goals:   discharge from facility.    Therapy recommendation(s):    Continued therapy is recommended.  Rationale/Recommendations:  Pt still presenting with self-reported strength deficits that could be addressed by HHPT.

## 2021-08-23 NOTE — PROGRESS NOTES
Care Management Discharge Note    Discharge Date: 08/23/2021       Discharge Disposition: Patients therapy recs changed to home today.    Discharge Services: Patient has home PT/OT orders. Patient is declining says she will decuss when she sees Dr Hussein at the [facility]     Discharge DME: None    Discharge Transportation:daughter    Education Provided on the Discharge Plan:  Yes  Persons Notified of Discharge Plans: Yes  Patient/Family in Agreement with the Plan: yes    Additional Information:  Met with patient to ask about primary care provider and choice of agency for home PT/OT orders. Patient said she did not want to make an appointment with a PCP. She would like to see Dr Hood at the [facility]. She will also discuss with him home PT/OT options.     Dr Hood  [address] [address]    Piter Kirk, ANA Kirk RN, BSN  5B RN Care Coordinator  [phone] phone  [phone] pager    Weekend or Holiday Care Coordinator [phone] pager  Job Code [id number]

## 2021-08-23 NOTE — DISCHARGE SUMMARY
[facility]  Discharge Summary - Medicine & Pediatrics       Date of Admission:  8/13/2021  Date of Discharge:  8/23/2021  Discharging Provider: Africa Hughes Service: Onofre Mccurdy    Discharge Diagnoses   Acute on chronic abdominal pain  Decompensated alcoholic cirrhosis c/b ascites  Left hepatic lobe hypodense lesion  Chronic leukocytosis  Lactic acidosis  Hematemesis- resolved  Hx of melena- resolved  B/l lower extremity edema  Malnutrition  Morbid obesity  TUD  Coagulopathy   Chronic macrocytic anemia  Hypokalemia  Polysubstance use disorder  Yeast cystitis     Follow-ups Needed After Discharge   Follow-up Appointments     Adult [facility] Follow-up and recommended labs and tests      Follow up with primary care provider within 7 days to evaluate medication   change and for hospital follow-up. The following labs/tests are   recommended: CBC, CMP.      Follow up with your liver doctor to evaluate medication change and for   hospital follow- up. The following labs/tests are recommended: triple   phase CT abdomen.    Appointments on [place] and/or [facility] (with [facility] or [facility]   provider or service). Call [phone] if you haven't heard regarding   these appointments within 7 days of discharge.         Follow up with PCP scheduled in 1 week for repeat labs- cbc, bmp  Follow up with hepatology clinic for repeat triple phase CT scan of abdomen   Follow up with pain clinic- referral sent     Unresulted Labs Ordered in the Past 30 Days of this Admission     Date and Time Order Name Status Description    8/21/2021 10:38 PM Blood Culture Hand, Left Preliminary     8/21/2021 10:38 PM Blood Culture Hand, Right Preliminary     8/18/2021  8:59 AM Blood Culture Hand, Right Preliminary           Discharge Disposition   Discharged to home with home care PT/OT  Condition at discharge: Stable    Hospital Course   Maricruz Lechuga is a 42 yo female with pmhx significant for acute  alcoholic hepatitis s/p steroids (pt did not complete the treatment), decompensated alcoholic cirrhosis with ascites and coagulopathy, morbid obesity, tobacco dependence who was admitted on 8/13/2021 for acute on chronic abdominal pain and BLE swelling along with self-reported hematemesis and melena which had resolved upon time of admission.     The following problems were addressed during her hospitalization:    Abdominal pain, improving  Left hepatic lobe hypodense lesion  Patient has chronic generalized abdominal pain, worse in RUQ. She has had multiple admissions over the past month due to this abdominal pain. Hospitalized on 6/24/2021 at [facility] in [place] and placed on a 28-day course of prednisolone which patient did not complete. Then admitted to [facility] on 7/8/2021 for ongoing pain and had an ER visit on 7/22/2021 again for ongoing abdominal pain. Previous CT scan in July 2021 showed hepatocellular disease and steatosis with portal hypertension including splenomegaly and recanalization of the umbilical vein also mild to moderate ascites and moderate anasarca. Repeat CT scan here showed the same finding and a hypodense liver lesion involving left hepatic lobe, though second CT on 8/18 here did not demonstrate that lesion well. Her prior hepatitis serologies were negative. Pt was positive for hep C antibody but her PCR was undetectable. Patient has had several paracentses which did not improve her pain. No evidence of SBP however completed  5 days abx therapy prophylactically during this admission. GI has followed and suspects diffuse anasarca is likely causing increased centralized somatization leading to her chronic pain. They have recommended continuing her on PPI BID, starting duloxetine, continuing lasix and other medications for cirrhosis, and following up with pain medicine as an outpatient for further management. They will plan to get a triple phase CT and follow her  as an outpatient as well.   - patient encouraged to take scheduled non-opiate pain medications and to only use oxycodone as needed- given small rx of 2.5mg Oxycodone q6H for 3 days until follow up with PCP.   - continue tylenol 650 mg Q8H   - continue methocarbamol 500mg QID  - continue gabapentin 100 mg BID and 300 mg QHS  - continue PO duloxetine  - Follow up with GI Hepatology as outpatient (request sent)   - Will get triple phase CT     Decompensated alcoholic cirrhosis c/b ascites  Hx of alcoholic hepatitis (s/p incomplete treatment)   Patient was recently diagnosed with acute alcoholic hepatitis. She was started on steroid that showed improvement however patient did not complete the steroid treatment. Liver labs WNL. No documented hx of esophageal varices. Recent EGD at the OSH showed gastritis. No prior HE however patient was on lactulose PTA. She has had large volume ascites and has undergone paracentesis several times. Her last paracentesis was on 08/02, at that time 4.4 L were removed. No prior hx of SBP, not on SBP prophylaxis PTA. Paracentesis completed 8/16 and 8/18, no evidence of SBP. Fluid culture 8/18 with no growth. Completed 5 days SBP treatment prophylactically. Per GI, no plan for immediate EGD. Will do triple phase CT abdominal scan and follow up as outpatient.   - continue lasix 40 mg daily  - continue spironolactone to 100 mg daily  - lactulose once daily, titrate to 3-4 stools daily    MELD-Na score: 12 at 8/22/2021  1:27 AM  MELD score: 12 at 8/22/2021  1:27 AM  Calculated from:  Serum Creatinine: 0.85 mg/dL (Using min of 1 mg/dL) at 8/22/2021  1:27 AM  Serum Sodium: 138 mmol/L (Using max of 137 mmol/L) at 8/22/2021  1:27 AM  Total Bilirubin: 0.8 mg/dL (Using min of 1 mg/dL) at 8/22/2021  1:27 AM  INR(ratio): 1.61 at 8/21/2021  6:37 AM  Age: 41 years    Chronic leukocytosis  Suspected to be related to alcoholic hepatitis. No fever or chills, no localizing s/s of infection aside from  abdominal pain. Multiple infectious workups this admission negative, including UA, CXR, and BCx; 8/22 repeat workup with elevated WBC, lactate, CRP but normal procal and lactic acidosis resolved, most likely related to liver disease rather than infection. CT AP with contrast 8/18 showing stable chronic liver disease/portal hypertension, moderate ascites and diffuse anasarca but no other changes. Paracentesis fluid testing 8/13 and 8/18 neg for SBP.  - Abx completed as above   - BCx 8/21 monitor, NGTD     Lactic acidosis- resolved   Related to intravascular volume depletion in the setting of anasarca and decreased clearance due to liver disease.     Hematemesis, resolved  Hx of melena, resolved  Patient reported small volume hematemesis on admission. On presentation she was vitally stable then became a little more tachycardic. Repeat CBC showed low Hgb at 7.7 however all 3 cell lines were dropped, could be dilutional vs acute bleeding. No further episodes after admission. She was started on IV octreotide and pantoprazole. As per chart review she had a recent EGD that showed gastritis. No scope recommended per GI during this admission. Continuing PPI BID. Follow up as outpatient. Hemoglobin 8.0 on discharge.        Bilateral lower extremity edema  Patient complained of bilateral lower extremity edema. PTA she was on spironolactone only which was stopped due to BROOKE. She also complained of bilateral lower extremity pain. No DVT on dopplers. Patient also has ascites and has been frequently getting paracentesis, concerns for anasarca. No known kidney or heart problem or any enteropathy. Echocardiogram unremarkable with LVEF 60-65%. She has alcoholic liver cirrhosis and albumin is low however other liver labs are normal. TSH wnl. No protein on UA however her urine protein/creatinine ratio was elevated on admission. US abdomen also revealed normal kidneys. Has had good UOP with current diuretic regimen. Discharge weight is  219 lbs.  - lymphedema consulted   - continue PO lasix 40mg daily  - continue [id number] daily  - monitor BMP every other day  - monitor I/O, daily weight        Acute on chronic macrocytic anemia  Patient has borderline macrocytosis. Her baseline hgb appears to be around 8-9. No ongoing bleeding but patient reported small volume hematemesis as above. She is hemodynamically stable. Concerned that another process might be driving the anemia, possibly related to alcohol use though patient denies alcohol use in past year. Folate is low. Vit B12 wnl. Retic count is appropriate. Peripheral smear 8/14 with moderate normochromic/normocytic anemia without hemolysis, slight neutrophilic leukocytosis. LDH normal.   - Repeat CBC in 1 week with PCP      Hypokalemia- resolved   - Spironolactone      Polysubstance use disorder  Pt was found to have opioids in her urine tox screen. Pt has a hx of alcohol use, however reports her last drink was 1 year ago. Patient reports smoking 1-2 cigarettes.   - patient declines addiction medicine/chemical dependency consults  - nicotine patch not indicated  - 3 days oxycodone given on discharge      Hx of Yeast cystitis- resolved   Completed 14 day course of  fluconazole. No further issues at this time.      Consultations This Hospital Stay   SOCIAL WORK IP CONSULT  GI HEPATOLOGY ADULT IP CONSULT  VASCULAR ACCESS CARE ADULT IP CONSULT  INTERNAL MEDICINE PROCEDURE TEAM ADULT IP CONSULT [place] - [facility]  PHYSICAL THERAPY ADULT IP CONSULT  NUTRITION SERVICES ADULT IP CONSULT  LYMPHEDEMA THERAPY IP CONSULT  CARE MANAGEMENT / SOCIAL WORK IP CONSULT  INTERNAL MEDICINE PROCEDURE TEAM ADULT IP CONSULT [place] - [facility]  VASCULAR ACCESS CARE ADULT IP CONSULT  SMOKING CESSATION PROGRAM IP CONSULT    Code Status   Full Code       The patient was discussed with MD Linnette Vora[address]  [address]  Phone: [phone]  ______________________________________________________________________    Physical Exam   Vital Signs: Temp: 97.6  F (36.4  C) Temp src: Oral BP: 100/53 Pulse: 104   Resp: 18 SpO2: 97 % O2 Device: None (Room air)    Weight: 219 lbs 8 oz    Gen: sleeping but awakens to voice, appears comfortable at rest laying in bed  HEENT: head atraumatic and normocephalic, hearing grossly normal, no conjunctival injection or scleral icterus, no nasal drainage, missing multiple upper teeth with very poor dentition, small scabbed area to right oral commissure with no active bleeding  Neck: normal ROM  CV: RRR, normal S1 and S2, no murmurs, rubs, or gallops  Pulm: CTAB, no wheezes, rhonchi, or rales. No increased WOB.  Abd: obese, soft, no tenderness to deep palpation with stethoscope, but mildly tender to light palpation with hand diffusely with significant tenderness over RUQ, improved from previous, normal bowel sounds throughout.  Ext: pitting edema dependently to level of the thighs, improved from previous, no wraps in place this morning, normal muscle tone, moves all extremities equally  Skin: warm and dry, no rashes, pallor, cyanosis, jaundice, or bruising.  Neuro: A&Ox3, answers questions appropriately, normal speech, CN II-XII grossly intact.  Psych: normal affect, makes good eye contact      Primary Care Physician   Physician No Ref-Primary    Discharge Orders      GASTROENTEROLOGY ADULT REF CONSULT ONLY      Home Care PT Referral for Hospital Discharge      Home Care OT Referral for Hospital Discharge      Reason for your hospital stay    Dear Maricruz Lechuga    Your were hospitalized at the [facility] with abdominal pain and liver disease and were treated with pain medication and medication for your liver. You also had drainage of your abdominal fluid and multiple evaluations for infection which were all normal.  Over your hospitalization your pain and other symptoms  improved and today you are ready to be discharged home.  If you continue taking your medications as prescribed you should continue to improve but if you develop fevers, chest pain, shortness of breath, lightheadedness, worsening abdominal pain or distension, nausea, vomiting, bloody or black stools, or other new concerns or complaints please seek medical attention.    We are suggesting the following medication changes:  - Start furosemide   - Increase spironolactone dose to 100mg daily  - Start gabapentin, duloxetine, and methocarbamol for pain  - Start oxycodone, only for breakthrough pain after taking the other medications above  - Start thiamine and folate    Please get the following tests done:  - Blood tests (CBC and CMP) within one week with primary care doctor  - CT abdomen (triple phase) with liver doctor    Please set up an appointment with:  - Primary care doctor  - Liver doctor (GI/hepatology)    Your hospital unit at the time of discharge is 5B so if you have any questions please call the hospital at [phone] and ask to talk to a nurse on 5B.     Take care!  Estephania Stern MD  Department of Medicine  [facility]     Activity    Your activity upon discharge: activity as tolerated     Adult [facility] Follow-up and recommended labs and tests    Follow up with primary care provider within 7 days to evaluate medication change and for hospital follow-up. The following labs/tests are recommended: CBC, CMP.      Follow up with your liver doctor to evaluate medication change and for hospital follow- up. The following labs/tests are recommended: triple phase CT abdomen.    Appointments on [place] and/or [facility] (with [facility] or [facility] provider or service). Call [phone] if you haven't heard regarding these appointments within 7 days of discharge.     Diet    Follow this diet upon discharge:       Snacks/Supplements Adult: Other; Ensure plant base chocolate at 2:00 and at HS daily; Between  Meals      2 Gram Sodium Diet       Significant Results and Procedures   Results for orders placed or performed during the hospital encounter of 08/13/21   POC US ABDOMEN LIMITED    Impression    Limited Bedside Abdominal Ultrasound, performed and interpreted by me.     Indication: Abdominal swelling and suspected SBP. Evaluate for Free fluid.     With the patient in Trendelenburg, the RUQ, LUQ, (including the paracolic gutters) views were examined for free fluid. With the patient in reverse Trendelenburg, the suprapubic view was examined for free fluid.     Findings: Free fluid present in both upper quadrants and in the pelvis    IMPRESSION: Free fluid present as noted above.     US Lower Extremity Venous Duplex Bilateral    Narrative    EXAMINATION: US LOWER EXTREMITY VENOUS DUPLEX BILATERAL  8/13/2021  10:32 PM      CLINICAL HISTORY:   leg swelling     COMPARISON: None available        PROCEDURE COMMENTS: Ultrasound was performed of the deep venous system  of the right and left lower extremity using grayscale, color, and  spectral Doppler.    FINDINGS:  The common femoral, greater saphenous origin, femoral, popliteal, and  deep calf veins are visualized and are patent. Venous waveforms are  normal. There is normal response to compression. Subcutaneous edema  within the soft tissues overlying the right popliteal vein, posterior  tibial vein, left popliteal vein and left posterior tibial vein.      Impression    IMPRESSION:.  1. No deep vein thrombosis in the right or left lower extremity.  2. Bilateral lower extremity subcutaneous edema.    I have personally reviewed the examination and initial interpretation  and I agree with the findings.    CARLITOS ALVARADO MD         SYSTEM ID:  [id number]   XR Chest Port 1 View    Narrative    EXAM: XR CHEST PORT 1 VIEW  8/14/2021 7:45 AM      HISTORY: decompensated cirrhosis, rule out infection    COMPARISON: None.    FINDINGS: Single view of the chest. Trachea is  midline.  Cardiomediastinal silhouette is within normal limits. Low inspiratory  lung volumes. No focal consolidative opacity, pleural effusion, or  pneumothorax.      Impression    IMPRESSION:  No acute airspace disease. Low inspiratory lung volumes.    I have personally reviewed the examination and initial interpretation  and I agree with the findings.    CARLITOS ALVARADO MD         SYSTEM ID:  [id number]   US Abdomen Complete w Doppler Complete    Narrative    EXAMINATION: US ABDOMEN COMPLETE WITH DOPPLER COMPLETE 8/14/2021 11:51  AM     COMPARISON: None.    HISTORY: History of alcoholic hepatitis. Admitted with hematemesis.    TECHNIQUE: The abdomen was scanned in standard fashion with  specialized ultrasound transducer(s) using both gray-scale, color  Doppler, and spectral flow techniques.    Findings:  Liver: The liver demonstrates a coarsened echotexture and measures up  to 18 cm.  Few areas of subtle nodularity but overall relatively  smooth surface contour.    Extrahepatic portal vein flow is antegrade at 27 cm/s.  Right portal vein flow is antegrade, measuring 23 cm/s.  Left portal vein flow is antegrade, measuring 18 cm/s.    Flow in the hepatic artery is towards the liver and:  33 cm/s peak systolic  0.66 resistive index.     The splenic vein is patent and flow is towards the liver.  The left,  middle, and right hepatic veins are patent with flow towards the IVC.  The IVC is patent with flow towards the heart.   The visualized aorta  is not dilated.    Gallbladder: Not seen.    Bile Ducts: Both the intra- and extrahepatic biliary system are of  normal caliber.  The common bile duct measures 5 mm.    Pancreas: Not well visualized.    Kidneys: Both kidneys are of normal echotexture, without mass or  hydronephrosis.   Renal lengths: right- 9.8 cm, left- 10.7 cm.    Spleen: The spleen measures 14.8 in length.    Fluid: Moderate volume ascites      Impression    Impression:   1.  Liver demonstrates coarsened  parenchymal echotexture which can be  seen in setting of chronic parenchymal disease.  2.  Moderate volume ascites.  3.  Patent right upper quadrant vasculature on Doppler evaluation.  4.  Gallbladder is not seen.  5.  Splenomegaly.    I have personally reviewed the examination and initial interpretation  and I agree with the findings.    CARLITOS ALVARADO MD         SYSTEM ID:  [id number]   CT Abdomen Pelvis w Contrast    Narrative    EXAMINATION: CT ABDOMEN PELVIS W CONTRAST, 8/14/2021 2:47 PM    TECHNIQUE:  Helical CT images from the lung bases through the  symphysis pubis were obtained with IV contrast. Contrast dose: Isovue  370    COMPARISON: None.    HISTORY: Abdominal pain, acute, nonlocalized    FINDINGS:    Abdomen and pelvis:  Findings of chronic liver disease with a  recanalized periumbilical vein, widening of fissures and multiple  gastrosplenic venous collaterals. 2.4 cm hypodense lesion in the left  hepatic lobe (series 2, image 24). No intrahepatic or extrahepatic  biliary dilatation. Gallbladder is not seen. Spleen is unremarkable.  Moderate fatty infiltration of the pancreas. Adrenal glands appear  normal. No evidence of hydronephrosis visualized ureters are  unremarkable. Bladder appears normal. Normal caliber small and large  bowel without evidence of obstruction.     Moderate to large volume ascites. Abdominal aorta is without evidence  of focal aneurysm. Origins of the celiac artery and superior  mesenteric arteries are grossly patent. Main portal vein and inferior  mesenteric vein are patent. Recanalized umbilical vein.    No abnormally enlarged lymph nodes in the abdomen or pelvis.    Lung bases: Small areas of linear atelectasis in the left base. No  pleural effusion.. Few prominent paracardiac  lymph nodes which may be  seen with chronic liver disease (series 2, image 11 and 19)    Bones and soft tissues: Diffuse anasarca. No suspicious osseous  lesions identified.      Impression     IMPRESSION:   1.  Findings suggestive of chronic liver disease with portal  hypertension including recanalized umbilical vein, widening of hepatic  fissures, multiple gastrosplenic venous collaterals, mild splenomegaly  and moderate volume ascites.  2. Subtle hypodense liver lesion in segment 2/4A. Recommend further  evaluation with multiphasic CT or preferably contrast-enhanced liver  MRI  3. Diffuse anasarca.    I have personally reviewed the examination and initial interpretation  and I agree with the findings.    OTILIO MONROE MD         SYSTEM ID:  [id number]   POC US Abdomen Limited    Impression    Ascites noted throughout abdomen.    Roberto Gifford D.O.  Internal Medicine, Hospitalist  [facility]  Pager: [phone]     XR Chest 2 Views    Narrative    EXAM: XR CHEST 2 VW  8/18/2021 12:54 PM     HISTORY:  increasing leukocytosis, infectious workup       COMPARISON:  Chest radiograph 8/14/2021    FINDINGS:   PA and lateral view of the chest. Trachea is midline. Normal lung  volumes. Minimally increased mixed interstitial and streaky opacities.  No focal consolidative opacities or appreciable pneumothorax. Blunting  of the right costophrenic angle. Heart size and shape is within normal  limits. Bones are grossly intact.      Impression    IMPRESSION:  1. Trace mixed interstitial and streaky opacities, likely consistent  with atelectasis versus atypical infection.  2. Trace right pleural effusion.    I have personally reviewed the examination and initial interpretation  and I agree with the findings.    JESS MARTINEZ MD         SYSTEM ID:  [id number]   CT Abdomen Pelvis w Contrast    Narrative    EXAMINATION: CT ABDOMEN PELVIS W CONTRAST  8/18/2021 12:13 PM      CLINICAL HISTORY: Abdominal distension; Abdominal pain, acute,  nonlocalized    COMPARISON: 8/14/2021    PROCEDURE COMMENTS: CT of the abdomen was performed with iopamidol  (ISOVUE-370) solution 135 mL  intravenous contrast. Coronal  and  sagittal reformatted images were obtained.    FINDINGS:  Lower thorax:   Mild atelectasis. The heart is not enlarged. No pericardial effusion.    Abdomen and pelvis:  Redemonstration findings related to chronic liver disease, recanalized  umbilical vein, widening of the hepatic fissures, and prominent  gastrosplenic collateral vessels. Previously described hypodense  lesion in the left hepatic lobe is not well appreciated on today's  exam. No intrahepatic or extrahepatic biliary dilatation. The  gallbladder is surgically absent. No suspicious splenic masses or  lesions. Pancreatic atrophy with fatty infiltration.    The hepatic vasculature is patent. Normal caliber abdominal aorta.    The bilateral adrenal glands appear normal. No suspicious renal masses  or lesions. No hydronephrosis. The bladder is incompletely distended.  No obvious bladder masses or lesions.    There are no abnormally dilated or thickened loops of small bowel or  colon. Moderate volume ascites. There are no abnormally sized lymph  nodes.    Osseous structures and soft tissues:   Generalized soft tissue edema. No suspicious osseous masses or  lesions.      Impression    IMPRESSION:  1. Chronic liver disease with portal hypertension.  2. Moderate volume ascites.  3. Diffuse anasarca.    I have personally reviewed the examination and initial interpretation  and I agree with the findings.    PRANAY SCOTT MD         SYSTEM ID:  [id number]   XR Abdomen Port 1 View    Narrative    XR ABDOMEN PORT 1 VIEWS  8/21/2021 11:32 PM      HISTORY: diffuse abdominal pain. Please eval for acute pathology    COMPARISON: Abdomen and pelvis CT 8/18/2021    FINDINGS: AP supine views of the abdomen. Nonobstructive bowel gas  pattern. No free air or definite pneumatosis. Mild colonic stool. Lung  bases are relatively clear.      Impression    IMPRESSION: Nonobstructive bowel gas pattern.    I have personally reviewed the examination and initial  interpretation  and I agree with the findings.    NELLI LARSEN MD         SYSTEM ID:  [id number]   Echo Complete     Value    LVEF  60-65%    Narrative    [id number]  [id number]  [id number]  [id number]^JERI^SALVATORE     [facility],[place]  Echocardiography Laboratory  [address] [place] [id number]     Name: GEETA VALLECILLO  MRN: [id number]  : 1980  Study Date: 2021 01:54 PM  Age: 41 yrs  Gender: Female  Patient Location: [facility]  Reason For Study: Other, Please Specify in Comments  Ordering Physician: SALVATORE WILCOX  Performed By: MICHAEL Abel     BSA: 2.2 m2  Height: 63 in  Weight: 264 lb  HR: 100  BP: 100/41 mmHg  ______________________________________________________________________________  Procedure  Complete Portable Echo Adult.  ______________________________________________________________________________  Interpretation Summary  Left ventricular size, wall motion and function are normal. The ejection  fraction is 60-65%.  Right ventricular function, chamber size, wall motion, and thickness are  normal.  Dilation of the inferior vena cava is present with abnormal respiratory  variation in diameter. No pericardial effusion is present.     There is no prior study for direct comparison.  ______________________________________________________________________________  Left Ventricle  Left ventricular size, wall motion and function are normal. The ejection  fraction is 60-65%. Diastolic function not assessed due to tachycardia.     Right Ventricle  Right ventricular function, chamber size, wall motion, and thickness are  normal.     Atria  Both atria appear normal.     Mitral Valve  The mitral valve is normal. Trace mitral insufficiency is present.     Aortic Valve  Aortic valve is normal in structure and function. The aortic valve is  tricuspid. Trace aortic insufficiency is present.     Tricuspid Valve  The tricuspid valve is normal. Trace to mild tricuspid insufficiency  is  present. The right ventricular systolic pressure is approximated at 43.5 mmHg  plus the right atrial pressure.     Pulmonic Valve  The pulmonic valve is normal.     Vessels  The aorta root is normal. IVC diameter >2.1 cm collapsing <50% with sniff  suggests a high RA pressure estimated at 15 mmHg or greater. Dilation of the  inferior vena cava is present with abnormal respiratory variation in diameter.     Pericardium  No pericardial effusion is present.     Compared to Previous Study  There is no prior study for direct comparison.  ______________________________________________________________________________  MMode/2D Measurements & Calculations     RVDd: 4.5 cm  IVSd: 1.0 cm  LVIDd: 4.9 cm  LVIDs: 3.5 cm  LVPWd: 1.1 cm  FS: 29.2 %  LV mass(C)d: 190.9 grams  LV mass(C)dI: 87.8 grams/m2  asc Aorta Diam: 2.5 cm  LVOT diam: 2.0 cm  LVOT area: 3.2 cm2  LA Volume Index (BP): 24.7 ml/m2  RWT: 0.45  TAPSE: 2.9 cm     Doppler Measurements & Calculations  MV E max jhon: 133.2 cm/sec  MV A max jhon: 141.0 cm/sec  MV E/A: 0.94  MV dec slope: 563.8 cm/sec2  MV dec time: 0.24 sec  PA acc time: 0.14 sec  TR max jhon: 329.9 cm/sec  TR max P.5 mmHg  E/E' av.6  Lateral E/e': 13.6  Medial E/e': 15.5     ______________________________________________________________________________  Report approved by: Braulio PINEDA 2021 02:51 PM               Discharge Medications   Current Discharge Medication List      START taking these medications    Details   DULoxetine (CYMBALTA) 20 MG capsule Take 1 capsule (20 mg) by mouth daily  Qty: 30 capsule, Refills: 0    Associated Diagnoses: Abdominal pain, generalized      folic acid (FOLVITE) 1 MG tablet Take 1 tablet (1 mg) by mouth daily  Qty: 30 tablet, Refills: 0    Associated Diagnoses: Alcoholic cirrhosis of liver with ascites (H)      furosemide (LASIX) 40 MG tablet Take 1 tablet (40 mg) by mouth daily  Qty: 30 tablet, Refills: 0    Associated Diagnoses: Bilateral lower  extremity edema      !! gabapentin (NEURONTIN) 100 MG capsule Take 1 capsule (100 mg) by mouth 2 times daily  Qty: 60 capsule, Refills: 0    Associated Diagnoses: Abdominal pain, generalized      !! gabapentin (NEURONTIN) 300 MG capsule Take 1 capsule (300 mg) by mouth At Bedtime  Qty: 30 capsule, Refills: 0    Associated Diagnoses: Abdominal pain, generalized      methocarbamol (ROBAXIN) 500 MG tablet Take 1 tablet (500 mg) by mouth every 6 hours  Qty: 10 tablet, Refills: 0    Associated Diagnoses: Abdominal pain, generalized      oxyCODONE (ROXICODONE) 5 MG tablet Take 0.5 tablets (2.5 mg) by mouth every 6 hours as needed for moderate to severe pain  Qty: 6 tablet, Refills: 0    Associated Diagnoses: Abdominal pain, generalized      thiamine (B-1) 100 MG tablet Take 1 tablet (100 mg) by mouth daily  Qty: 30 tablet, Refills: 0    Associated Diagnoses: Alcoholic cirrhosis of liver with ascites (H)       !! - Potential duplicate medications found. Please discuss with provider.      CONTINUE these medications which have CHANGED    Details   spironolactone (ALDACTONE) 100 MG tablet Take 1 tablet (100 mg) by mouth daily  Qty: 30 tablet, Refills: 0    Associated Diagnoses: Bilateral lower extremity edema         CONTINUE these medications which have NOT CHANGED    Details   acetaminophen (TYLENOL) 325 MG tablet Take 325 mg by mouth every 6 hours as needed for mild pain      lactulose (CHRONULAC) 10 GM/15ML solution Take 10 g by mouth 2 times daily      pantoprazole (PROTONIX) 40 MG EC tablet Take 40 mg by mouth daily         STOP taking these medications       fluconazole (DIFLUCAN) 200 MG tablet Comments:   Reason for Stopping:             Allergies   No Known Allergies

## 2021-08-23 NOTE — PLAN OF CARE
Pt discharged unit at 1330. Pt belongings sent. Education provided. Daughter coming to bring her home. Writer explained to call unit with any additional questions.

## 2021-08-23 NOTE — PLAN OF CARE
"/53 (BP Location: Left arm)   Pulse 104   Temp 97.6  F (36.4  C) (Oral)   Resp 18   Ht 1.6 m (5' 3\")   Wt 99.6 kg (219 lb 8 oz)   SpO2 97%   BMI 38.88 kg/m    VSS except tachy.  Patient up to BSC and BR independently. Patient had shower last evening. Provider paged as patient requested IV dilaudid stating she didn't think the oxycodone PO was effective. Provider recommended sticking with PO oxy and patient had 2 doses PRN overnight along with tylenol.  Patient slept between cares. LUE PIV patent and second LUE PIV removed per patient request. Patient tolerating regular diet. Denies sob or [place].  Patient states she wants to be discharged today as daughter in town and able to bring her back home. Patient states she does not want to go to TCU. Patient reports she will have para set up [place]. Plan: Hourly rounding complete, call light within reach. Continue to monitor and follow POC. Notify provider of changes.  "

## 2021-08-23 NOTE — PLAN OF CARE
Lymphedema Discharge Summary    Reason for therapy discharge:    Discharged to home.    Progress towards therapy goal(s). See goals on Care Plan in [facility] electronic health record for goal details.  Goals partially met.  Barriers to achieving goals:   discharge from facility.    Therapy recommendation(s):    Recommend continued use of Comperm for edema management. May benefit from eventual OP follow-up for long term garment options if edema persists. Would likely benefit from  therapies for conditioning/home safety evaluation.

## 2021-08-24 ENCOUNTER — PATIENT OUTREACH (OUTPATIENT)
Dept: CARE COORDINATION | Facility: CLINIC | Age: 41
End: 2021-08-24

## 2021-08-24 DIAGNOSIS — Z71.89 OTHER SPECIFIED COUNSELING: ICD-10-CM

## 2021-08-24 NOTE — PROGRESS NOTES
Clinic Care Coordination Contact  [facility]/Voicemail       Clinical Data: Care Coordinator Outreach  Outreach attempted x 1.Unable to leave [facility].  Plan: Care Coordinator will try to reach patient again in 1-2 business days.    .Joslyn SANABRIA Community Health Worker  Clinic Care Coordination  [facility]  Phone: [phone]

## 2021-08-25 NOTE — PROGRESS NOTES
Clinic Care Coordination Contact  [facility]: Post-Discharge Note  SITUATION                                                      Admission:    Admission Date: 08/13/21   Reason for Admission: abdominal pain  Discharge:   Discharge Date: 08/23/21  Discharge Diagnosis: Acute on chronic abdominal painDecompensated alcoholic cirrhosis c/b ascites    BACKGROUND                                                      Maricruz Lechuga is a 42 yo female with pmhx significant for acute alcoholic hepatitis s/p steroids (pt did not complete the treatment), decompensated alcoholic cirrhosis with ascites and coagulopathy, morbid obesity, tobacco dependence who was admitted on 8/13/2021 for acute on chronic abdominal pain and BLE swelling along with self-reported hematemesis and melena which had resolved upon time of admission.     ASSESSMENT           Discharge Assessment  How are you doing now that you are home?: better  How are your symptoms? (Red Flag symptoms escalate to triage hotline per guidelines): Improved  Do you feel your condition is stable enough to be safe at home until your provider visit?: Yes  Does the patient have their discharge instructions? : Yes  Does the patient have questions regarding their discharge instructions? : No  Were you started on any new medications or were there changes to any of your previous medications? : Yes  Does the patient have all of their medications?: Yes  Do you have questions regarding any of your medications? : Yes (see comment)  Discharge follow-up appointment scheduled within 14 calendar days? : Yes  Discharge Follow Up Appointment Date: 09/01/21  Discharge Follow Up Appointment Scheduled with?: Primary Care Provider    Post-op ([facility] CTA Only)  If the patient had a surgery or procedure, do they have any questions for a nurse?: Yes (see comment)             PLAN                                                      Outpatient Plan:  Follow-up Appointments     Adult [facility]  Follow-up and recommended labs and tests      Follow up with primary care provider within 7 days to evaluate medication   change and for hospital follow-up. The following labs/tests are   recommended: CBC, CMP.       Follow up with your liver doctor to evaluate medication change and for   hospital follow- up. The following labs/tests are recommended: triple   phase CT abdomen.     Appointments on [place] and/or [facility] (with [facility] or [facility]   provider or service). Call [phone] if you haven't heard regarding   these appointments within 7 days of discharge.         Follow up with PCP scheduled in 1 week for repeat labs- cbc, bmp  Follow up with hepatology clinic for repeat triple phase CT scan of abdomen   Follow up with pain clinic- referral sent     No future appointments.      For any urgent concerns, please contact our 24 hour nurse triage line: [phone] ([phone])         Joslyn SANABRIA Community Health Worker  Clinic Care Coordination  [facility]  Phone: [phone]

## 2021-08-27 LAB
BACTERIA BLD CULT: NO GROWTH
BACTERIA BLD CULT: NO GROWTH

## 2022-01-30 ENCOUNTER — MEDICAL CORRESPONDENCE (OUTPATIENT)
Dept: HEALTH INFORMATION MANAGEMENT | Facility: CLINIC | Age: 42
End: 2022-01-30

## 2022-02-03 ENCOUNTER — REFERRAL (OUTPATIENT)
Dept: TRANSPLANT | Facility: CLINIC | Age: 42
End: 2022-02-03
Attending: PHYSICIAN ASSISTANT
Payer: COMMERCIAL

## 2022-02-03 DIAGNOSIS — K76.6 PORTAL HYPERTENSION (H): ICD-10-CM

## 2022-02-03 DIAGNOSIS — K70.31 ALCOHOLIC CIRRHOSIS OF LIVER WITH ASCITES (H): ICD-10-CM

## 2022-02-03 DIAGNOSIS — K70.30 ALCOHOLIC CIRRHOSIS (H): Primary | ICD-10-CM

## 2022-02-03 NOTE — LETTER
Maricruz Lechuga  [address] [address]                February 11, 2022          MEDICAL RECORDS REQUEST  [facility] liver transplant team is requesting records from Referring Providers Office for patients referred to the Liver Transplant Program                 Images Needed to Process Intake of Patient:    CXR Images (most recent)    Chest CT Images (all within last 24 months or most recent)    Abdominal CT Report and Images  (all within last 24 months or most recent)    Abdominal MRI Report and Images  (all within last 24 months or most recent)    Bone Scan Images and Report (No DEXA Scans)    PET Images and Report  Records Needed to Process Intake of Patient:    Cardiac Catheterization Results (most recent on file)    Chest CT Report (all within last 24 months or most recent)    Chest X-Ray Report (all within last 24 months or most recent)    Culture Results (last 2 years on file)    ECHO Results (most recent on file)    Hospital Discharge Summaries (last 2 years on file)    Lab Results (most recent on file)    History and Physical (original on file)    Liver Pathology All Reports     Physicians Notes (last 3 reports on file)    Radiology Reports (not including Chest X-ray, last 2 years on file)    EGD Images and Report     Colonoscopy Report with Pathology    Requested imaging may be electronically sent to the [employer] system via [employer] DICOM connection.   When unable to send imaging electronically, an exported DICOM CD may be sent. Please indicate when images have been sent electronically on a return faxed cover sheet.    Requested pathology slides should be accompanied by the appropriate report from your institution.    When the patient is hand carrying requested records or the requested records are not at your facility, please indicate this information on a return faxed cover sheet.    Please fax all paper records to [phone] within 3-5 business days.    Please send  all scans/slides to:   [facility]  Solid Organ Transplant Office  [address] [facility] [place], [address] [address]    Please call our office at [phone] if you have any questions or concerns.

## 2022-02-03 NOTE — LETTER
February 9, 2022        Maricruz Lechuga  [address] [facility] [address]      Dear Maricruz,       You have recently expressed interest in our Solid Organ Transplant Program and have requested to begin the evaluation process.  We have made several attempts to get in touch with you but have been unable to reach you.    If you are still interested and/or have any questions, please contact us at  [phone] or [phone] and ask to speak with the .      Monday - Friday, between the hours of 8:00am and 5:00pm central time.    We look forward to hearing from you.      Regards,     Solid Organ Transplant Intake Team  [address]  [address], [facility] [address]  [place] [address]

## 2022-02-03 NOTE — LETTER
2/11/2022    Maricruz Lechuga  [address] [address] [address]    Dear Maricruz,    Thank you for your interest in the Transplant Center at [facility]. We look forward to being a part of your care team and assisting you through the transplant process.    As we discussed, your transplant coordinator is Ivan Chinchilla Jr. (Liver).  You may call your coordinator at any time with questions or concerns call [phone].    Please complete the following.    1. Fill out and return the enclosed forms    Authorization for Electronic Communication    Authorization to Discuss Protected Health Information    Authorization for Release of Protected Health Information    Authorization for Care Everywhere Release of Information    2. Sign up for:    [employer], access to your electronic medical record (see enclosed pamphlet)    [email or web address], a transplant education website    You can use these tools to learn more about your transplant, communicate with your care team, and track your medical details      Sincerely,  Solid Organ Transplant  [facility]    cc: Referring Physician and PCP

## 2022-02-03 NOTE — LETTER
PHYSICIAN ORDERS    DATE & TIME ISSUED: 2022:12 PM  PATIENT NAME: Maricruz Lechuga   : 1980     [facility] MR# : [id number]     DIAGNOSIS:  cirrhosis  ICD-10 CODE: K70.31   Orders  1 year after issue.  Please have drawn on 3/2/22 for virtual appt with [facility] on 3/4/22  These labs must be drawn all together on the same day when done:                 INR                 Hepatic panel                 BMP                 CBC w/ platelets                 Phosphatidylethanol (PEth)     PLEASE FAX RESULTS AS SOON AS POSSIBLE TO [phone], ATTN:Lab SHANKAR Watt MD

## 2022-02-03 NOTE — LETTER
April 20, 2022    Maricruz Claudia Lechuga  [address] [address] [address] [facility] [address]    Dear Ms. Lechuga,   The purpose of this letter is to let you know that the decision was made to close your referral for liver transplant evaluation. This is because we have not heard back from you. If you wish to continue please contact your coordinator below to be re-instated.   Important things you should know:    If you would like to discuss the decision, or if your medical status changes you may schedule a return visits with your doctor by calling [phone] and asking to speak to your transplant coordinator.    We recommend that you continue to follow up with your primary care doctor and referring provider Dr. Hood in order to manage your health concerns.  Enclosed is a letter from [facility] which describes the services offered to patients by [facility] and the Organ Procurement and Transplantation Network.  Thank you for allowing us to participate in your care.  We wish you well.  Sincerely,    MARCUS Harvey Jr.N, RN  Liver Transplant Coordinator  [phone]    Enclosures: OS Letter  cc: Care Team                  The Organ Procurement and Transplantation Network  Toll-free patient services line:     Your resource for organ transplant information    If you have a question regarding your own medical care, you always should call your transplant hospital first. However, for general organ transplant-related information, you can call the Organ Procurement and Transplantation Network (OPTN) toll-free patient services line at [phone]- [phone]. Anyone, including potential transplant candidates, candidates, recipients, family members, friends, living donors, and donor family members, can call this number to:          Talk about organ donation, living donation, the transplant process, the donation process, and transplant policies.    Get a free patient information kit with helpful booklets, waiting list and transplant information, and a list of all  transplant hospitals.    Ask questions about the OPTN website (https://optn.transplant.hrsa.gov/), the United Network for Organ Sharing s (UNOS) website (https://unos.org/), or the UNOS website for living donors and transplant recipients. (https://www.transplantliving.org/).    Learn how the OPTN can help you.    Talk about any concerns that you may have with a transplant hospital.    The [facility] transplant system, the OPTN, is managed under federal contract by the United Network for Organ Sharing (UNOS), which is a non-profit charitable organization. The OPTN helps create and define organ sharing policies that make the best use of donated organs. This process continuously evaluating new advances and discoveries so policies can be adapted to best serve patients waiting for transplants. To do so, the OPTN works closely with transplant professionals, transplant patients, transplant candidates, donor families, living donors, and the public. All transplant programs and organ procurement organizations throughout the country are OPTN members and are obligated to follow the policies the OPTN creates for allocating organs.    The OPTN also is responsible for:      Providing educational material for patients, the public, and professionals.    Raising awareness of the need for donated organs and tissue.    Coordinating organ procurement, matching, and placement.    Collecting information about every organ transplant and donation that occurs in the [place].                Remember, you should contact your transplant hospital directly if you have questions or concerns about your own medical care including medical records, work-up progress, and test results.    We are not your transplant hospital, and our staff will not be able to answer questions about your case, so please keep your transplant hospital s phone number handy.    However, while you research your transplant needs and learn as much as you can about  transplantation and donation, we welcome your call to our toll-free patient services line at [phone].          Updated 4/1/2019

## 2022-02-09 NOTE — TELEPHONE ENCOUNTER
February 9, 2022 3:29 PM - Cherelle Wells LPN:   TTRY letter sent to patient and SOT referring MD after second call attempt.

## 2022-02-11 VITALS — BODY MASS INDEX: 31.89 KG/M2 | HEIGHT: 63 IN | WEIGHT: 180 LBS

## 2022-02-11 ASSESSMENT — MIFFLIN-ST. JEOR: SCORE: 1455.6

## 2022-02-11 NOTE — TELEPHONE ENCOUNTER
Patient was asked the following questions during liver intake call.     Referring Provider: Dr. Nelsy Lacy   Referring Diagnosis: Alcoholic Cirrhosis of the Liver   PCP: Dr. Bartolo Hood     1)Do you know why you have liver disease: Yes       If Alcoholic Cirrhosis is present when was your last drink: 2019       Have you ever been through treatment for alcohol: No  2) Presence of Ascites: Yes Paracentesis: Yes  3) Presence of Hepatic Encephalopathy: Yes Medications: Yes  4) History of GI Bleeding: Yes  5) Oxygen Use: None  6) EGD: Yes on 8/3/2021. Pt doesn't remember location of EGD.   7) Colonoscopy: No  8) MELD Score: 15  9) Labs available for review from PCP/GI: Yes  10) HCC Diagnosis: No  11)Insurance information: [facility] [place]       Policy anthony: Self       Subscriber/policy/ID number: Patient didn't have card available       Group Number:     Referral intake process completed.  Patient is aware that after financial approval is received, medical records will be requested.   Patient confirmed for a callback from transplant coordinator on 2/17/22.  Tentative evaluation date TBD.    Confirmed coordinator will discuss evaluation process in more detail at the time of their call.   Patient is aware of the need to arrange age appropriate cancer screening, vaccinations, and dental care.  Reminded patient to complete questionnaire, complete medical records release, and review packet prior to evaluation visit .  Assessed patient for special needs (ie--wheelchair, assistance, guardian, and ):  None   Patient instructed to call [phone] with questions.     Patient gave verbal consent during intake call to obtain medical records and documents outside of [facility]:  Yes     MARCUS Hansen, LPN       Solid Organ Transplant

## 2022-02-17 NOTE — TELEPHONE ENCOUNTER
"MELD score 9 with labs from 2/2/22 in [facility]    Will set up with consult as soon as possible    Found out / dx about 8 months ago - ER trip  Reported sober 16 months  No treatment    Not working at this time - caring for 15 mo grandchild    Ascites - yes  Taps - every 7-10  HE - controlled on meds  GIB - yes - \"couple times\"  EGD -   [place]on[place] -     [place]     Head - no  Heart - no   Lungs - no - smokes \"rarely\" now; heavy when younger  Kidneys - no  Cancer - no    Plan: slot held for video visit with Dr. Watt on 3/4/22 at 11 am (labs locally on 3/2 or 3/3)    [employer] link sent to patient                 "

## 2022-02-28 ENCOUNTER — TELEPHONE (OUTPATIENT)
Dept: BEHAVIORAL HEALTH | Facility: CLINIC | Age: 42
End: 2022-02-28
Payer: COMMERCIAL

## 2022-03-02 ENCOUNTER — TELEPHONE (OUTPATIENT)
Dept: BEHAVIORAL HEALTH | Facility: CLINIC | Age: 42
End: 2022-03-02

## 2022-03-02 NOTE — TELEPHONE ENCOUNTER
This patient was a no call/no show to her 1:00 pm substance abuse assessment appointment today on 3/2/2022.  A voice message was left for this patient with no return call as of 1:15 pm.  The patient should be directed to call [facility] at [phone] to re-schedule the substance abuse assessment appointment as needed.

## 2022-03-07 ENCOUNTER — TELEPHONE (OUTPATIENT)
Dept: TRANSPLANT | Facility: CLINIC | Age: 42
End: 2022-03-07
Payer: COMMERCIAL

## 2022-03-07 NOTE — TELEPHONE ENCOUNTER
Patient missed consults with JESSE, RODDY pate, &  Dr. Watt last week    Will maxine for coming week (3/23 slot held for Shukri at 4-5pm) but will change to virtual appt given distance.     Message sent to [facility]'ing

## 2022-03-07 NOTE — LETTER
PHYSICIAN ORDERS    DATE & TIME ISSUED: 2022 12:09 PM  PATIENT NAME: Maricruz Lechuga   : 1980     [facility] MR# : [id number]     DIAGNOSIS:  cirrhosis  ICD-10 CODE: K70.31   Orders  1 year after issue.  Please have drawn 3/16/22-3/19/22 in prep for virtual appt on 3/23/22 w/ [facility]  These labs must be drawn all together on the same day when done:                 INR                 Hepatic panel                 BMP                 CBC w/ platelets                 Phosphatidylethanol (PEth)     PLEASE FAX RESULTS AS SOON AS POSSIBLE TO [phone], ATTN:Roman Watt MD

## 2022-03-08 NOTE — TELEPHONE ENCOUNTER
RECORDS RECEIVED FROM: Internal   Appt Date: 03.23.2022   NOTES STATUS DETAILS   OFFICE NOTE from referring provider Internal 02.03.2022 Neha Watt MD   DISCHARGE SUMMARY from hospital Care Everywhere 02.21.2022, 02.18.2022, 01.30.2022 [facility]      More in Epic   MEDICATION LIST Internal / CE    IMAGING     ULTRASOUND LIVER Internal    Care Everywhere 08.14.2021 US ABDOMEN COMPLETE WITH       02.24.2022, 02.21.2022, 02.16.2022, 02.09.2022, 01.31.2022, 01.25.2022, 01.18.2022 US PARACENTESIS     01.31.2022 US Abd Limited RUQ   CT OF ABDOMEN Care Everywhere  01.04.2022 CT ABDOMEN PELVIS W IV CONTRAST    01.02.2022 CT ABDOMEN PELVIS W     LABS     HEPATIC PANEL (LIVER PANEL) Care Everywhere 01.31.2022   BASIC METABOLIC PANEL Care Everywhere 01.31.2022   COMPLETE METABOLIC PANEL Care Everywhere 02.21.2022   COMPLETE BLOOD COUNT (CBC) Care Everywhere 02.21.2022   INTERNATIONAL NORMALIZED RATIO (INR) Care Everywhere 02.02.2022   HEPATITIS C ANTIBODY Care Everywhere 06.25.2021   HEPATITIS B SURFACE ANTIGEN Care Everywhere 06.25.2021   HEPATITIS B CORE ANTIBODY Care Everywhere 06.25.2021     Action 03.08.2022 RM   Action Taken Pending for images      Action 03.15.2022 RM   Action Taken Called [facility]) to get images pushed called [phone] to a rep who will be pushing images over, images received and uploaded to chart.

## 2022-03-21 ENCOUNTER — TELEPHONE (OUTPATIENT)
Dept: BEHAVIORAL HEALTH | Facility: CLINIC | Age: 42
End: 2022-03-21

## 2022-03-21 NOTE — TELEPHONE ENCOUNTER
Patient have a video appointment today with [facility] at 1pm. Writer placed a call to check in patient. Patient informed writer that pt is at an appt, pt will reschedule. Writer will follow up with patient again, to provide pt with Intake's number so that patient can reschedule when available.

## 2022-03-23 ENCOUNTER — VIRTUAL VISIT (OUTPATIENT)
Dept: GASTROENTEROLOGY | Facility: CLINIC | Age: 42
End: 2022-03-23
Attending: PHYSICIAN ASSISTANT
Payer: COMMERCIAL

## 2022-03-23 ENCOUNTER — PRE VISIT (OUTPATIENT)
Dept: GASTROENTEROLOGY | Facility: CLINIC | Age: 42
End: 2022-03-23
Payer: COMMERCIAL

## 2022-03-23 DIAGNOSIS — K70.30 ALCOHOLIC CIRRHOSIS, UNSPECIFIED WHETHER ASCITES PRESENT (H): ICD-10-CM

## 2022-03-23 DIAGNOSIS — K76.6 PORTAL HYPERTENSION (H): ICD-10-CM

## 2022-03-23 DIAGNOSIS — Z53.9 ERRONEOUS ENCOUNTER--DISREGARD: Primary | ICD-10-CM

## 2022-03-23 DIAGNOSIS — K70.31 ALCOHOLIC CIRRHOSIS OF LIVER WITH ASCITES (H): ICD-10-CM

## 2022-03-23 PROCEDURE — 10000001 PR ERRONEOUS ENCOUNTER--DISREGARD: Performed by: PHYSICIAN ASSISTANT

## 2022-03-23 NOTE — PROGRESS NOTES
Hepatology Clinic note  Maricruz Lechuga   Date of Birth 1980  Date of Service 3/22/2022    REASON FOR CONSULTATION: ***  REFERRING PROVIDER:***         Assessment/plan:   Maricruz Lechuga is a 41 year old female with ETOH cirrhosis     Follow-up in clinic *** or sooner as needed    Gelacio Dangelo PA-C   [facility] Hepatology     -----------------------------------------------------       HPI:   Maricruz Lechuga is a 41 year old female  presenting for the evaluation of elevated LFT's.     LFT's were first noticed to be abnormal in .     Patient reports***    Hospitalizations***,ER***    Patient denies jaundice, lower extremity edema, abdominal distension or confusion.      Patient also denies melena, hematochezia or hematemesis.    Patient denies weight loss, fevers, sweats or chills.    PMH:     SMH:     Medications:       No previous tobacco use. ETOH includes ***. No previous IV/IN drug use.    Currently works*** . Patient currently lives***    Family history***    Previous work-up:  HCV antibody  HCV RNA  HAV Ab IgG  HAV Ab IgM   HBV SAb***  HBV SAg***  HBV CAb***  HIV ***  LETTY ***  F-actin  AMA ***  Iron panel ***  Ferritin   Iron Sats:   TTG   Alpha-1-antripsin  Ceruloplasmin   TSH   Cholesterol Total   HDL  LDL  Triglycerides  Hemoglobin A1c    Medical hx Surgical hx   Past Medical History:   Diagnosis Date     History of blood transfusion     No past surgical history on file.              Medications:     Current Outpatient Medications   Medication     acetaminophen (TYLENOL) 325 MG tablet     DULoxetine (CYMBALTA) 20 MG capsule     folic acid (FOLVITE) 1 MG tablet     furosemide (LASIX) 40 MG tablet     gabapentin (NEURONTIN) 100 MG capsule     gabapentin (NEURONTIN) 300 MG capsule     lactulose (CHRONULAC) 10 GM/15ML solution     methocarbamol (ROBAXIN) 500 MG tablet     oxyCODONE (ROXICODONE) 5 MG tablet     pantoprazole (PROTONIX) 40 MG EC tablet     spironolactone (ALDACTONE) 100 MG tablet  "    thiamine (B-1) 100 MG tablet     No current facility-administered medications for this visit.            Allergies:   No Known Allergies         Social History:     Social History     Socioeconomic History     Marital status: Single     Spouse name: Not on file     Number of children: Not on file     Years of education: Not on file     Highest education level: Not on file   Occupational History     Not on file   Tobacco Use     Smoking status: Current Some Day Smoker     Types: Cigarettes     Smokeless tobacco: Never Used     Tobacco comment: 8/16/2021 patient smokes 1-2 cigarettes a day/ Patient declines counseling, NRT, and \"Quitting for Good\" workbook   Substance and Sexual Activity     Alcohol use: Not Currently     Comment: Last ETOH use was 2019     Drug use: Not Currently     Sexual activity: Not on file   Other Topics Concern     Parent/sibling w/ CABG, MI or angioplasty before 65F 55M? Not Asked   Social History Narrative     Not on file     Social Determinants of Health     Financial Resource Strain: Not on file   Food Insecurity: Not on file   Transportation Needs: Not on file   Physical Activity: Not on file   Stress: Not on file   Social Connections: Not on file   Intimate Partner Violence: Not on file   Housing Stability: Not on file            Family History:   No family history on file.           Review of Systems:   Gen: See HPI     HEENT: No change in vision or hearing, mouth sores, dysphagia, lymph nodes  Resp: No shortness of breath, coughing, hx of asthma  CV: No chest pain, palpitations, syncope   GI: See HPI  : No dysuria, history of stones, urine color    Skin: No rash; no pruritus or psoriasis  MS: No arthralgias, myalgias, joint swelling  Neuro: No memory changes, confusion, numbness    Heme: No difficulty clotting, bruising, bleeding  Psych:  No anxiety, depression, agitation          Physical Exam:   VS:  There were no vitals taken for this visit.      Gen: A&Ox3, NAD, well " developed  HEENT: non-icteric ***, no cervical lymphadenopathy, no lesions or ulcers in oropharynx  CV: RRR, no overt murmurs  Lung: CTA Bilatererally, no wheezing or crackles.   Lym- no palpable lymphadenopathy  Abd: soft, NT, ND *** no palpable splenomegaly, liver is not palpable.   Ext: no edema, intact pulses.   Skin: No rash***, no palmar erythema, telangiectasias or jaundice  Neuro: grossly intact, no asterixis   Psych: appropriate mood and affects         Data:   Reviewed in person and significant for:    Lab Results   Component Value Date     08/22/2021      Lab Results   Component Value Date    POTASSIUM 3.4 08/22/2021     Lab Results   Component Value Date    CHLORIDE 106 08/22/2021     Lab Results   Component Value Date    CO2 28 08/22/2021     Lab Results   Component Value Date    BUN 8 08/22/2021     Lab Results   Component Value Date    CR 0.85 08/22/2021       Lab Results   Component Value Date    WBC 15.6 08/22/2021     Lab Results   Component Value Date    HGB 8.0 08/22/2021     Lab Results   Component Value Date    HCT 24.7 08/22/2021     Lab Results   Component Value Date    MCV 95 08/22/2021     Lab Results   Component Value Date     08/22/2021       Lab Results   Component Value Date    AST 20 08/22/2021     Lab Results   Component Value Date    ALT 12 08/22/2021     No results found for: BILICONJ   Lab Results   Component Value Date    BILITOTAL 0.8 08/22/2021       Lab Results   Component Value Date    ALBUMIN 2.5 08/22/2021     Lab Results   Component Value Date    PROTTOTAL 6.2 08/22/2021      Lab Results   Component Value Date    ALKPHOS 66 08/22/2021       Lab Results   Component Value Date    INR 1.61 08/21/2021         Imaging:

## 2022-03-23 NOTE — LETTER
3/23/2022     RE: Maricruz Lechuga  [address] [facility] [id number]    Dear Colleague,    Thank you for referring your patient, Maricruz Lechuga, to the [facility] [place]. Please see a copy of my visit note below.    Left voicemail for patient to call back to set up telemedicine visit, will call again before appointment time.  Chelsey Pinzon CMA on 3/23/2022 at 1:40 PM    Left vm Chelsey Pinzon CMA on 3/23/2022 at 2:04 PM    Unable to reach x 3. Voicemail full. - Gelacio Dangelo PA-C     This encounter was opened in error. Please disregard.      Again, thank you for allowing me to participate in the care of your patient.        Sincerely,        Gelacio Dangelo PA-C

## 2022-03-23 NOTE — PROGRESS NOTES
Left voicemail for patient to call back to set up telemedicine visit, will call again before appointment time.  Chelsey Pinzon CMA on 3/23/2022 at 1:40 PM    Left vm Chelsey Pinzon CMA on 3/23/2022 at 2:04 PM    Unable to reach x 3. Voicemail full. - Gelacio Dangelo PA-C     This encounter was opened in error. Please disregard.

## 2022-04-18 ENCOUNTER — TELEPHONE (OUTPATIENT)
Dept: TRANSPLANT | Facility: CLINIC | Age: 42
End: 2022-04-18
Payer: COMMERCIAL

## 2022-04-18 NOTE — TELEPHONE ENCOUNTER
Transplant Social Work Services Phone Call      Data: I completed a psychosocial assessment with Maricruz on 3/23/22 via phone.   Intervention: I attempted to reach Maricruz but there was no answer and no way to leave a voice message.  Assessment: Maricruz initially no showed for her chemical dependency evaluation scheduled for 3/2/22.  I had placed a request for this to be rescheduled, though I don't see that it was.  Education provided by SW: deferred  Plan: Message to transplant coordinator Joshua Chinchilla to better understand patient's plan of care.        WENDY Banda, [facility]  Liver Transplant   Phone [phone]  Pager [phone]

## 2022-04-20 NOTE — TELEPHONE ENCOUNTER
Tried calling determine if patient is interested in continuing toward txp.    Patient MELD is 9 from 2/2/22 CE labs    Referral will be closed and letter will be sent

## 2023-05-21 NOTE — PLAN OF CARE
"/56 (BP Location: Left arm)   Pulse 91   Temp 97.9  F (36.6  C) (Oral)   Resp 18   Ht 1.6 m (5' 3\")   Wt 111.4 kg (245 lb 8 oz)   SpO2 98%   BMI 43.49 kg/m      Care from: [id number]      VS & Pain: VSS on room air, prn po Oxy x1 and one time dose of IV Dilaudid were given for abdominal pain    Neuro: A&O x4    Respiratory: WDL    Cardiac: WDL    Peripheral neurovascular: 3+ dependent and BLE edema    GI/: adequate urine output, no BM this shift    Nutrition: poor appetite and oral intake    Skin: changed dressing for R abdominal paracentesis site, ELVI under lymph wraps- was applied this morning by lymphedema    Musculoskeletal: generalized weakness    Lines: L PIV is saline locked    Activity: Up with SBA    Events this shift: Call light within reach.    Plan: Continue to follow poc.  " [facility]  - Ronn Villavicencio M.D.

## 2023-08-06 NOTE — PROGRESS NOTES
[facility]    Progress Note - Onofre 1 Service        Date of Admission:  8/13/2021    Assessment & Plan      Maricruz Lechuga is a 41 year old female with a PMHX significant for acute alcoholic hepatitis s/p steroids (pt did not complete the treatment), decompensated alcoholic cirrhosis with ascites and coagulopathy, morbid obesity, tobacco dependence, presented with abdominal pain and BLE swelling, hematemesis and melena and was found to have low hemoglobin.     #updates today  -TTE wnl  -start PO duloxetine  -start PO lasix  -start PO gabapentin  -resume PTA spironolactone  -discussed with GI, no role of steroids at this time    #generalized abdominal pain  #Left hepatic lobe hypodense lesion  Patient has chronic generalized abdominal pain that has acutely worsened. Patient has had multiple admissions over the past month due to abdominal pain. She was hospitalized on 6/24/2021 at [facility] in [place] and placed on a 28-day course of prednisolone which patient did not complete. She was then admitted to [facility] on 7/8/2021 for ongoing pain and had an ER visit on 7/22/2021 again for ongoing abdominal pain. Previous CT scan in July 2021 showed hepatocellular disease and steatosis with portal hypertension including splenomegaly and recanalization of the umbilical vein also mild to moderate ascites and moderate anasarca. Repeat CT scan here showed the same finding and a hypodense liver lesion involving left hepatic lobe. Her prior hepatitis serologies were negative. Though she was positive for hep C antibody but her PCR was undetectable. Patient has had several paracentesis which did not improve her pain. Likely her abdominal pain could be due to her cirrhosis.     -continue oxycodone 5 mg Q6H PRN  -continue IV dilaudid 0.5 mg Q3H PRN for break through pain  -continue tylenol 650 mg Q8H  -start PO gabapentin  -start PO duloxetine  -outpatient  work up for liver  mass      #bilateral lower extremity edema  #?anasarca  Patient complained of bilateral lower extremity edema. PTA she was on spironolactone only which was stopped due to BROOKE. She also complained of bilateral lower extremity pain. No DVT on dopplers. No signs of cellulitis. Patient also has ascites and has been frequently getting paracentesis,  concerns for anasarca. No known kidney or heart problem or any enteropathy.  She has alcoholic liver cirrhosis and albumin is low however other liver labs are normal. TSH wnl. No protein on UA however her urine protein/creatinien ratio is elevated. US abdomen also revealed normal kidneys.     -start PO lasix 40mg daily  -resume PTA spironolactone  -monitor BMP    #lactic acidosis  Likely 2/2 intravascular volume depletion in the setting of anasarca and decreased clearance due to liver disease. Less concerned about infection at this time  -okay to start abx if any HD instability   -no need to trend lactate    #acute on chronic macrocytic anemia  Patient has borderline macrocytosis. Her baseline hb appears to be around 8-9. No ongoing bleeding but patient reported small volume hematemesis as above. She is hemodynamically stable. Concerned that another process might be driving the anemia. Folate is low. Vit B12 wnl. Retic count is appropriate.       -monitor Hb daily  -peripheral smear pending   -will check LDH, haptoglobin    #decompensated alcoholic cirrhosis  #hx of alcoholic hepatitis (s/p incomplete treatment)  Patient was recently diagnosed with acute alcoholic hepatitis. She was started on steroid that showed improvement however patient did not complete the steroid treatment. Her liver labs surprisingly looks normal.   No documented hx of esophageal varices. Recent EGD at the OSH showed gastritis. No prior HE as well however patient was on lactulose PTA. She has had large volume ascites and has undergone paracentesis several times. Her last paracentesis  was on 08/02 at that time 4.4 L were removed. No prior hx of SBP. She was not on SBP prophylaxis as well PTA.   MELD-Na score: 12 at 8/16/2021  7:10 AM  MELD score: 12 at 8/16/2021  7:10 AM  Calculated from:  Serum Creatinine: 0.79 mg/dL (Using min of 1 mg/dL) at 8/16/2021  7:10 AM  Serum Sodium: 139 mmol/L (Using max of 137 mmol/L) at 8/16/2021  7:10 AM  Total Bilirubin: 1.0 mg/dL at 8/15/2021  6:08 AM  INR(ratio): 1.69 at 8/15/2021  6:08 AM  Age: 41 years    -GI consulted, appreciated recs  -No immediate plan for EGD  -anemia work up as above  -stop lactulose, no prior HE  -start lasix as above  -resume PTA spironolactone      #hematemesis---resolved  #Hx of melena -----resolved  Patient reported small volume hematemesis. On presentation she was vitally stable then became a little more tachycardic. Repeat CBC showed low Hb at 7.7 however all 3 cell lines were dropped, could be some what dilutional. No further episodes after admission. She was started on IV octreotide and pantoprazole. As per chart review she had a recent EGD that showed gastritis. GI consulted, awaiting recs.     -2 gm sodium diet  -continue PO pantoprazole   -GI consulted, appreciated recs    #chronic leukocytosis---improving  Could be due to alcoholic hepatitis. Vitally stable on admission. No fever or chills. No localizing s/s of infection.   -infectious work up including BCX, CXR, UA  -monitor CBC    #hx of Yeast cystitis  -s/p 14 day course of  fluconazole    #tobacco dependence  Patient reported smoking 1-2 cigarettes.   -nicotine patch not indicated     Diet: 2 Gram Sodium Diet  Snacks/Supplements Adult: Other; Boost/Ensure; Between Meals    DVT Prophylaxis: INR elevated, ambulation   Anderson Catheter: Not present  Fluids: NA  Central Lines: None  Code Status: Full Code      Disposition Plan   Expected discharge:2 days recommended to prior living arrangement once adequate pain management/ tolerating PO medications.     The patient's care was  discussed with the Attending Physician, Dr. Rabago.    MD Linnette [facility] Service  [facility]  Securely message with the [employer] Web Console (learn more here)  Text page via [employer] Paging/Directory  Please see sign in/sign out for up to date coverage information      ______________________________________________________________________    Interval History   No acute events overnight. Patient reported ongoing abdominal pain that did not improve at all after admission and not even after paracentesis. No nausea or vomiting. Normal BM.     4 point ROS is negative.     Data reviewed today: I reviewed all medications, new labs and imaging results over the last 24 hours.     Physical Exam   Vital Signs: Temp: 98.1  F (36.7  C) Temp src: Oral BP: 100/41 Pulse: 100   Resp: 16 SpO2: 100 % O2 Device: None (Room air)    Weight: 264 lbs 8.83 oz  Constitutional: awake, alert, cooperative, no apparent distress, and appears stated age  Eyes: Lids and lashes normal, pupils equal, round and reactive to light, extra ocular muscles intact, sclera clear, conjunctiva normal  ENT: normocepalic, without obvious abnormality  Hematologic / Lymphatic: no cervical lymphadenopathy  Respiratory: No increased work of breathing, good air exchange, clear to auscultation bilaterally, no crackles or wheezing  Cardiovascular: normal rate, regula rhythm, no murmur  GI: obese, soft, generalized tenderness, bowel sounds present  Skin: no bruising or bleeding  Musculoskeletal: bilateral lower extremity edema 3-4+ extending up to the knees  Neurologic: awake, alert and oriented X3, no focal deficit  Neuropsychiatric: General: normal, calm and normal eye contact  Level of consciousness: alert / normal  Affect: normal    Data   Recent Labs   Lab 08/16/21  0710 08/15/21  0608 08/15/21  0032 08/14/21  1632 08/14/21  0633 08/13/21  1807   WBC 11.4* 11.4*  --  12.1* 13.9* 18.1*   HGB 7.8* 7.9*  7.9* 8.1* 8.6*  7.7* 9.3*    101*  --  100 100 99    259  --  257 248 307   INR  --  1.69*  --   --  1.97* 1.72*    139  --   --  137 137   POTASSIUM 3.6 3.7  --   --  3.5 3.3*   CHLORIDE 108 110*  --   --  107 106   CO2 26 22  --   --  26 23   BUN 4* 5*  --   --  7 8   CR 0.79 0.81  --   --  0.70 0.69   ANIONGAP 5 7  --   --  4 8   MELODY 7.8* 7.9*  --   --  8.1* 7.6*   * 133*  --   --  82 136*   ALBUMIN  --  2.3*  --   --  2.8* 1.7*   PROTTOTAL  --  6.6*  --   --  6.8 6.7*   BILITOTAL  --  1.0  --   --  1.4* 1.1   ALKPHOS  --  93  --   --  89 119   ALT  --  12  --   --  12 18   AST  --  19  --   --  22 21   LIPASE  --   --   --   --   --  <10*     Recent Results (from the past 24 hour(s))   Echo Complete   Result Value    LVEF  60-65%    Narrative    [id number]  [id number]  [id number]  [id number]^JERI^SALVATORE     [facility],[place]  Echocardiography Laboratory  [address] [place] [id number]     Name: GEETA VALLECILLO  MRN: [id number]  : 1980  Study Date: 2021 01:54 PM  Age: 41 yrs  Gender: Female  Patient Location: [facility]  Reason For Study: Other, Please Specify in Comments  Ordering Physician: SALVATORE WILCOX  Performed By: MICHAEL Abel     BSA: 2.2 m2  Height: 63 in  Weight: 264 lb  HR: 100  BP: 100/41 mmHg  ______________________________________________________________________________  Procedure  Complete Portable Echo Adult.  ______________________________________________________________________________  Interpretation Summary  Left ventricular size, wall motion and function are normal. The ejection  fraction is 60-65%.  Right ventricular function, chamber size, wall motion, and thickness are  normal.  Dilation of the inferior vena cava is present with abnormal respiratory  variation in diameter. No pericardial effusion is present.     There is no prior study for direct comparison.  ______________________________________________________________________________  Left  Ventricle  Left ventricular size, wall motion and function are normal. The ejection  fraction is 60-65%. Diastolic function not assessed due to tachycardia.     Right Ventricle  Right ventricular function, chamber size, wall motion, and thickness are  normal.     Atria  Both atria appear normal.     Mitral Valve  The mitral valve is normal. Trace mitral insufficiency is present.     Aortic Valve  Aortic valve is normal in structure and function. The aortic valve is  tricuspid. Trace aortic insufficiency is present.     Tricuspid Valve  The tricuspid valve is normal. Trace to mild tricuspid insufficiency is  present. The right ventricular systolic pressure is approximated at 43.5 mmHg  plus the right atrial pressure.     Pulmonic Valve  The pulmonic valve is normal.     Vessels  The aorta root is normal. IVC diameter >2.1 cm collapsing <50% with sniff  suggests a high RA pressure estimated at 15 mmHg or greater. Dilation of the  inferior vena cava is present with abnormal respiratory variation in diameter.     Pericardium  No pericardial effusion is present.     Compared to Previous Study  There is no prior study for direct comparison.  ______________________________________________________________________________  MMode/2D Measurements & Calculations     RVDd: 4.5 cm  IVSd: 1.0 cm  LVIDd: 4.9 cm  LVIDs: 3.5 cm  LVPWd: 1.1 cm  FS: 29.2 %  LV mass(C)d: 190.9 grams  LV mass(C)dI: 87.8 grams/m2  asc Aorta Diam: 2.5 cm  LVOT diam: 2.0 cm  LVOT area: 3.2 cm2  LA Volume Index (BP): 24.7 ml/m2  RWT: 0.45  TAPSE: 2.9 cm     Doppler Measurements & Calculations  MV E max jhon: 133.2 cm/sec  MV A max jhon: 141.0 cm/sec  MV E/A: 0.94  MV dec slope: 563.8 cm/sec2  MV dec time: 0.24 sec  PA acc time: 0.14 sec  TR max jhon: 329.9 cm/sec  TR max P.5 mmHg  E/E' av.6  Lateral E/e': 13.6  Medial E/e': 15.5     ______________________________________________________________________________  Report approved by: Braulio PINEDA  08/16/2021 02:51 PM            Spontaneous, unlabored and symmetrical